# Patient Record
Sex: MALE | Race: WHITE | NOT HISPANIC OR LATINO | Employment: OTHER | URBAN - METROPOLITAN AREA
[De-identification: names, ages, dates, MRNs, and addresses within clinical notes are randomized per-mention and may not be internally consistent; named-entity substitution may affect disease eponyms.]

---

## 2017-02-21 ENCOUNTER — DOCUMENTATION ONLY (OUTPATIENT)
Dept: VASCULAR SURGERY | Facility: CLINIC | Age: 67
End: 2017-02-21

## 2017-04-21 DIAGNOSIS — N18.2 CKD (CHRONIC KIDNEY DISEASE) STAGE 2, GFR 60-89 ML/MIN: ICD-10-CM

## 2017-04-21 DIAGNOSIS — I10 ESSENTIAL HYPERTENSION WITH GOAL BLOOD PRESSURE LESS THAN 130/80: ICD-10-CM

## 2017-04-21 DIAGNOSIS — I25.10 CORONARY ARTERY DISEASE INVOLVING NATIVE CORONARY ARTERY OF NATIVE HEART WITHOUT ANGINA PECTORIS: ICD-10-CM

## 2017-04-21 DIAGNOSIS — E78.5 HYPERLIPIDEMIA LDL GOAL <100: ICD-10-CM

## 2017-04-24 NOTE — TELEPHONE ENCOUNTER
Lipitor     Last Written Prescription Date: 10/19/2016  Last Fill Quantity: 90, # refills: 1  Last Office Visit with [facility], [facility] or [facility] prescribing provider: 10/19/2016       Lab Results   Component Value Date    CHOL 169 10/19/2016     Lab Results   Component Value Date    HDL 50 10/19/2016     Lab Results   Component Value Date    LDL 96 10/19/2016     Lab Results   Component Value Date    TRIG 116 10/19/2016     Lab Results   Component Value Date    CHOLHDLRATIO 4.1 04/10/2015     Lisinopril      Last Written Prescription Date: 10/19/2016  Last Fill Quantity: 90, # refills: 1  Last Office Visit with [facility], [facility] or [facility] prescribing provider: 10/19/2016       Potassium   Date Value Ref Range Status   10/19/2016 4.4 3.4 - 5.3 mmol/L Final     Creatinine   Date Value Ref Range Status   10/19/2016 1.07 0.66 - 1.25 mg/dL Final     BP Readings from Last 3 Encounters:   10/19/16 108/68   05/25/16 116/66   04/28/16 124/80

## 2017-04-25 RX ORDER — ATORVASTATIN CALCIUM 80 MG/1
TABLET, FILM COATED ORAL
Qty: 90 TABLET | Refills: 1 | Status: SHIPPED | OUTPATIENT
Start: 2017-04-25 | End: 2017-10-24

## 2017-04-25 RX ORDER — LISINOPRIL 10 MG/1
TABLET ORAL
Qty: 90 TABLET | Refills: 1 | Status: SHIPPED | OUTPATIENT
Start: 2017-04-25 | End: 2017-10-24

## 2017-04-25 NOTE — TELEPHONE ENCOUNTER
Prescription approved per [facility] Refill Protocol.  Naima Iniguez, RN, BSN

## 2017-05-19 DIAGNOSIS — I10 ESSENTIAL HYPERTENSION WITH GOAL BLOOD PRESSURE LESS THAN 130/80: ICD-10-CM

## 2017-05-19 NOTE — TELEPHONE ENCOUNTER
metoprolol      Last Written Prescription Date: 10/19/16  Last Fill Quantity: 90, # refills: 1    Last Office Visit with [facility], [facility] or [facility] prescribing provider:  10/19/16   Future Office Visit:    NA    BP Readings from Last 3 Encounters:   10/19/16 108/68   05/25/16 116/66   04/28/16 124/80

## 2017-05-22 RX ORDER — METOPROLOL SUCCINATE 25 MG/1
TABLET, EXTENDED RELEASE ORAL
Qty: 90 TABLET | Refills: 0 | Status: SHIPPED | OUTPATIENT
Start: 2017-05-22 | End: 2017-08-18

## 2017-08-18 DIAGNOSIS — I10 ESSENTIAL HYPERTENSION WITH GOAL BLOOD PRESSURE LESS THAN 130/80: ICD-10-CM

## 2017-08-18 NOTE — TELEPHONE ENCOUNTER
Metoprolol      Last Written Prescription Date: 05/22/17  Last Fill Quantity: 90, # refills: 0    Last Office Visit with [facility] or [facility] prescribing provider:  10/19/16   Future Office Visit:        BP Readings from Last 3 Encounters:   10/19/16 108/68   05/25/16 116/66   04/28/16 124/80

## 2017-08-21 RX ORDER — METOPROLOL SUCCINATE 25 MG/1
TABLET, EXTENDED RELEASE ORAL
Qty: 90 TABLET | Refills: 0 | Status: SHIPPED | OUTPATIENT
Start: 2017-08-21 | End: 2017-11-17

## 2017-08-21 NOTE — TELEPHONE ENCOUNTER
Prescription approved per [facility] Refill Protocol.  Due for annual exam in October.     Jessica Goldman, RN, BSN

## 2017-10-24 DIAGNOSIS — E78.5 HYPERLIPIDEMIA LDL GOAL <100: ICD-10-CM

## 2017-10-24 DIAGNOSIS — I25.10 CORONARY ARTERY DISEASE INVOLVING NATIVE CORONARY ARTERY OF NATIVE HEART WITHOUT ANGINA PECTORIS: ICD-10-CM

## 2017-10-24 DIAGNOSIS — N18.2 CKD (CHRONIC KIDNEY DISEASE) STAGE 2, GFR 60-89 ML/MIN: ICD-10-CM

## 2017-10-24 DIAGNOSIS — I10 ESSENTIAL HYPERTENSION WITH GOAL BLOOD PRESSURE LESS THAN 130/80: ICD-10-CM

## 2017-10-27 RX ORDER — LISINOPRIL 10 MG/1
TABLET ORAL
Qty: 30 TABLET | Refills: 0 | Status: SHIPPED | OUTPATIENT
Start: 2017-10-27 | End: 2017-11-17

## 2017-10-27 RX ORDER — ATORVASTATIN CALCIUM 80 MG/1
TABLET, FILM COATED ORAL
Qty: 30 TABLET | Refills: 0 | Status: SHIPPED | OUTPATIENT
Start: 2017-10-27 | End: 2017-11-17

## 2017-10-27 NOTE — TELEPHONE ENCOUNTER
Lipitor  BP Readings from Last 3 Encounters:   10/19/16 108/68   05/25/16 116/66   04/28/16 124/80     Lab Results   Component Value Date    CHOL 169 10/19/2016     Lab Results   Component Value Date    HDL 50 10/19/2016     Lab Results   Component Value Date    LDL 96 10/19/2016     Lab Results   Component Value Date    TRIG 116 10/19/2016     Lab Results   Component Value Date    CHOLHDLRATIO 4.1 04/10/2015     Medication is being filled for 1 time refill only due to:  Patient needs labs FLP. Patient needs to be seen because it has been more than one year since last visit.    Lisinopril  Potassium   Date Value Ref Range Status   10/19/2016 4.4 3.4 - 5.3 mmol/L Final   ]  Creatinine   Date Value Ref Range Status   10/19/2016 1.07 0.66 - 1.25 mg/dL Final   ]  Medication is being filled for 1 time refill only due to:  Patient needs labs potassium and creatinine. Patient needs to be seen because it has been more than one year since last visit.     Claudine Williamson RN

## 2017-11-13 NOTE — PATIENT INSTRUCTIONS
Preventive Health Recommendations:       Male Ages 65 and over    Yearly exam:             See your health care provider every year in order to  o   Review health changes.   o   Discuss preventive care.    o   Review your medicines if your doctor has prescribed any.    Talk with your health care provider about whether you should have a test to screen for prostate cancer (PSA).    Every 3 years, have a diabetes test (fasting glucose). If you are at risk for diabetes, you should have this test more often.    Every 5 years, have a cholesterol test. Have this test more often if you are at risk for high cholesterol or heart disease.     Every 10 years, have a colonoscopy. Or, have a yearly FIT test (stool test). These exams will check for colon cancer.    Talk to with your health care provider about screening for Abdominal Aortic Aneurysm if you have a family history of AAA or have a history of smoking.  Shots:     Get a flu shot each year.     Get a tetanus shot every 10 years.     Talk to your doctor about your pneumonia vaccines. There are now two you should receive - Pneumovax (PPSV 23) and Prevnar (PCV 13).    Talk to your doctor about a shingles vaccine.     Talk to your doctor about the hepatitis B vaccine.  Nutrition:     Eat at least 5 servings of fruits and vegetables each day.     Eat whole-grain bread, whole-wheat pasta and brown rice instead of white grains and rice.     Talk to your doctor about Calcium and Vitamin D.   Lifestyle    Exercise for at least 150 minutes a week (30 minutes a day, 5 days a week). This will help you control your weight and prevent disease.     Limit alcohol to one drink per day.     No smoking.     Wear sunscreen to prevent skin cancer.     See your dentist every six months for an exam and cleaning.     See your eye doctor every 1 to 2 years to screen for conditions such as glaucoma, macular degeneration and cataracts.    .These are new changes to your current plan of care based  on today's visit:    Medications stopped    Medications to be started    Change dose of this medication None   New treatments        Follow up appointments:    1.  FOLLOW UP WITH YOUR PRIMARY CARE PROVIDER: 1 year(s) for General physical     Carter Malone MD

## 2017-11-13 NOTE — PROGRESS NOTES
SUBJECTIVE:   Kendrick Castellon is a 67 year old male who presents for Preventive Visit.    Are you in the first 12 months of your Medicare coverage?  Yes,  Visual Acuity:  Right Eye: 20/20 w/glasses w/o glasses unable to see.    Left Eye: 20/25 w/glasses, w/o glasses 10/80    Physical   Annual:     Getting at least 3 servings of Calcium per day::  Yes    Bi-annual eye exam::  Yes    Dental care twice a year::  Yes    Sleep apnea or symptoms of sleep apnea::  None    Diet::  Regular (no restrictions)    Frequency of exercise::  6-7 days/week    Duration of exercise::  30-45 minutes    Taking medications regularly::  Yes    Medication side effects::  None    Additional concerns today::  No      COGNITIVE SCREEN  1) Repeat 3 items (Banana, Sunrise, Chair)    2) Clock draw: NORMAL  3) 3 item recall: Recalls 3 objects   Results: NORMAL clock, 3 items recalled: COGNITIVE IMPAIRMENT LESS LIKELY    Mini-CogTM Copyright [employer]. Licensed by the author for use in [facility]; reprinted with permission (ede@[facility]). All rights reserved.          Reviewed and updated as needed this visit by clinical staff  Tobacco  Allergies  Meds  Med Hx  Surg Hx  Fam Hx  Soc Hx        Reviewed and updated as needed this visit by Provider        Social History   Substance Use Topics     Smoking status: Former Smoker     Packs/day: 1.00     Years: 15.00     Quit date: 4/2/1987     Smokeless tobacco: Never Used     Alcohol use 7.0 oz/week     14 Standard drinks or equivalent per week      Comment: moderation       The patient does not drink >3 drinks per day nor >7 drinks per week.      Today's PHQ-2 Score:   PHQ-2 ( 1999 Pfizer) 11/17/2017   Q1: Little interest or pleasure in doing things 0   Q2: Feeling down, depressed or hopeless 0   PHQ-2 Score 0   Q1: Little interest or pleasure in doing things Not at all   Q2: Feeling down, depressed or hopeless Not at all   PHQ-2 Score 0       Do you feel safe in your environment -  Yes    Do you have a Health Care Directive?: No, Patient states he has information. He has just not got around to it.    Current providers sharing in care for this patient include: Patient Care Team:  Carter Malone MD as PCP - General (Family Practice)      Hearing impairment: No    Ability to successfully perform activities of daily living: Yes, no assistance needed     Fall risk:  Fallen 2 or more times in the past year?: No  Any fall with injury in the past year?: No      Home safety:  none identified      The following health maintenance items are reviewed in Epic and correct as of today:  Health Maintenance   Topic Date Due     FALL RISK ASSESSMENT  04/28/2017     PNEUMOCOCCAL (2 of 2 - PPSV23) 04/28/2017     INFLUENZA VACCINE (SYSTEM ASSIGNED)  09/01/2017     CMP Q1 YR  10/19/2017     LIPID MONITORING Q1 YEAR  10/19/2017     MICROALBUMIN Q1 YEAR  10/19/2017     ADVANCE DIRECTIVE PLANNING Q5 YRS  11/09/2017     COLONOSCOPY Q10 YR  05/10/2022     TETANUS IMMUNIZATION (SYSTEM ASSIGNED)  04/28/2026     AORTIC ANEURYSM SCREENING (SYSTEM ASSIGNED)  Completed     HEPATITIS C SCREENING  Addressed         Pneumonia Vaccine:Adults age 65+ who received Pneumovax (PPSV23) at 65 years or older: Should be given PCV13 > 1 year after their most recent PPSV23      Hyperlipidemia Follow-Up      Rate your low fat/cholesterol diet?: good    Taking statin?  Yes, no muscle aches from statin    Other lipid medications/supplements?:  none    Hypertension Follow-up      Outpatient blood pressures are not being checked.    Low Salt Diet: no added salt    Vascular Disease Follow-up:  Coronary Artery Disease (CAD)      Chest pain or pressure, left side neck or arm pain: No    Shortness of breath/increased sweats/nausea with exertion: No    Pain in calves walking 1-2 blocks: No    Worsened or new symptoms since last visit: No    Nitroglycerin use: no    Daily aspirin use: Yes    Chronic Kidney Disease Follow-up      Current NSAID  "use?  No            Review of Systems  C: NEGATIVE for fever, chills, change in weight  CONSTITUTIONAL: Has reduced his weight over time with exercise daily and some dietary changes.  I: NEGATIVE for worrisome rashes, moles or lesions  E: NEGATIVE for vision changes or irritation  E/M: NEGATIVE for ear, mouth and throat problems  R: NEGATIVE for significant cough or SOB  B: NEGATIVE for masses, tenderness or discharge  CV: NEGATIVE for chest pain, palpitations or peripheral edema  CV: No recurrent anginal symptoms. He has had a previous open heart surgery and bypass procedure.  GI: NEGATIVE for nausea, abdominal pain, heartburn, or change in bowel habits  GI: Doing well with current medications in controlling GERD.  : NEGATIVE for frequency, dysuria, or hematuria   male : Past history of chronic kidney disease stage II with microalbuminuria. Rechecking today.  M: NEGATIVE for significant arthralgias or myalgia  N: NEGATIVE for weakness, dizziness or paresthesias  E: NEGATIVE for temperature intolerance, skin/hair changes  H: NEGATIVE for bleeding problems  P: NEGATIVE for changes in mood or affect    OBJECTIVE:   /72  Pulse 68  Temp 98  F (36.7  C) (Temporal)  Resp 16  Ht 5' 9.25\" (1.759 m)  Wt 196 lb 6.4 oz (89.1 kg)  BMI 28.79 kg/m2 Estimated body mass index is 28.79 kg/(m^2) as calculated from the following:    Height as of this encounter: 5' 9.25\" (1.759 m).    Weight as of this encounter: 196 lb 6.4 oz (89.1 kg).  Physical Exam  GENERAL: healthy, alert and no distress  EYES: Eyes grossly normal to inspection, PERRL and conjunctivae and sclerae normal  HENT: ear canals and TM's normal, nose and mouth without ulcers or lesions  NECK: no adenopathy, no asymmetry, masses, or scars and thyroid normal to palpation  RESP: lungs clear to auscultation - no rales, rhonchi or wheezes  BREAST: normal without masses, tenderness or nipple discharge and no palpable axillary masses or adenopathy  CV: regular " rate and rhythm, normal S1 S2, no S3 or S4, no murmur, click or rub, no peripheral edema and peripheral pulses strong  CV: post open heart surgery  ABDOMEN: soft, nontender, no hepatosplenomegaly, no masses and bowel sounds normal   (male): normal male genitalia without lesions or urethral discharge, no hernia  RECTAL: normal sphincter tone, no rectal masses and prostate 1-2 + enlarged, nontender  MS: no gross musculoskeletal defects noted, no edema  SKIN: no suspicious lesions or rashes  NEURO: Normal strength and tone, mentation intact and speech normal  PSYCH: mentation appears normal, affect normal/bright  LYMPH: no cervical, supraclavicular, axillary, or inguinal adenopathy    ASSESSMENT / PLAN:   1. Medicare annual wellness visit, initial    Completed.    2. Coronary artery disease involving native coronary artery of native heart without angina pectoris   renew medications for 1 year. Checking labs.  - atorvastatin (LIPITOR) 80 MG tablet; TAKE 1 TABLET(80 MG) BY MOUTH DAILY  Dispense: 90 tablet; Refill: 3  - CBC with platelets  - Lipid panel reflex to direct LDL Fasting  - Albumin Random Urine Quantitative with Creat Ratio  - Comprehensive metabolic panel    3. Hyperlipidemia LDL goal <100   Continue current medications pending laboratory results  - atorvastatin (LIPITOR) 80 MG tablet; TAKE 1 TABLET(80 MG) BY MOUTH DAILY  Dispense: 90 tablet; Refill: 3  - Lipid panel reflex to direct LDL Fasting  - Comprehensive metabolic panel    4. CKD (chronic kidney disease) stage 2, GFR 60-89 ml/min  Checking laboratory results.  - lisinopril (PRINIVIL/ZESTRIL) 10 MG tablet; TAKE 1 TABLET(10 MG) BY MOUTH DAILY  Dispense: 90 tablet; Refill: 3  - CBC with platelets  - Lipid panel reflex to direct LDL Fasting  - Comprehensive metabolic panel    5. Essential hypertension with goal blood pressure less than 130/80  Blood pressure adequately controlled.  - lisinopril (PRINIVIL/ZESTRIL) 10 MG tablet; TAKE 1 TABLET(10 MG) BY  "MOUTH DAILY  Dispense: 90 tablet; Refill: 3  - metoprolol (TOPROL-XL) 25 MG 24 hr tablet; TAKE 1 TABLET(25 MG) BY MOUTH DAILY  Dispense: 90 tablet; Refill: 3  - Comprehensive metabolic panel    6. Erectile dysfunction, unspecified erectile dysfunction type  Renew medications, no side effects noted  - sildenafil (VIAGRA) 100 MG tablet; Take 0.5-1 tablets ( mg) by mouth daily as needed Take 30 min to 4 hours before intercourse.  Never use with nitroglycerin, terazosin or doxazosin.  Dispense: 36 tablet; Refill: 4    7. Special screening for malignant neoplasm of prostate  Checking PSA after discussion.  - Prostate spec antigen screen    8. Microalbuminuria  Checking labs with previous positive findings  - Albumin Random Urine Quantitative with Creat Ratio    9. Gastroesophageal reflux disease without esophagitis  Continue current medications    10. Need for 23-polyvalent pneumococcal polysaccharide vaccine  Complete pneumococcal vaccination series.  - Pneumococcal vaccine 23 valent PPSV23  (Pneumovax) [25638]  - ADMIN: Vaccine, Initial ([id number])    End of Life Planning:  Patient currently has an advanced directive: No.  I have verified the patient's ability to prepare an advanced directive/make health care decisions.  Literature was provided to assist patient in preparing an advanced directive.    COUNSELING:  Reviewed preventive health counseling, as reflected in patient instructions  Special attention given to:       Regular exercise       Healthy diet/nutrition       Immunizations    Vaccinated for: Pneumococcal           Aspirin Prophylaxis       Colon cancer screening       Prostate cancer screening          Estimated body mass index is 28.79 kg/(m^2) as calculated from the following:    Height as of this encounter: 5' 9.25\" (1.759 m).    Weight as of this encounter: 196 lb 6.4 oz (89.1 kg).  Weight management plan: Discussed healthy diet and exercise guidelines and patient will follow up in 12 months in " clinic to re-evaluate.   reports that he quit smoking about 30 years ago. He has a 15.00 pack-year smoking history. He has never used smokeless tobacco.        Appropriate preventive services were discussed with this patient, including applicable screening as appropriate for cardiovascular disease, diabetes, osteopenia/osteoporosis, and glaucoma.  As appropriate for age/gender, discussed screening for colorectal cancer, prostate cancer, breast cancer, and cervical cancer. Checklist reviewing preventive services available has been given to the patient.    Reviewed patients plan of care and provided an AVS. The Basic Care Plan (routine screening as documented in Health Maintenance) for Kendrick meets the Care Plan requirement. This Care Plan has been established and reviewed with the Patient.    Counseling Resources:  ATP IV Guidelines  Pooled Cohorts Equation Calculator  Breast Cancer Risk Calculator  FRAX Risk Assessment  ICSI Preventive Guidelines  Dietary Guidelines for Americans, 2010  USDA's MyPlate  ASA Prophylaxis  Lung CA Screening    Follow-up in 1 year or as needed.    The patient understood the rational for the diagnosis and treatment plan. All questions were answered to best of my ability and the patient's satisfaction.    Note: Chart documentation done in part with Dragon Voice Recognition software. Although reviewed after completion, some word and grammatical errors may remain.  Please consider this when interpreting information in this chart.      Carter Malone MD  [facility]

## 2017-11-17 ENCOUNTER — OFFICE VISIT (OUTPATIENT)
Dept: FAMILY MEDICINE | Facility: CLINIC | Age: 67
End: 2017-11-17
Payer: MEDICARE

## 2017-11-17 VITALS
HEART RATE: 68 BPM | SYSTOLIC BLOOD PRESSURE: 134 MMHG | BODY MASS INDEX: 29.09 KG/M2 | TEMPERATURE: 98 F | DIASTOLIC BLOOD PRESSURE: 72 MMHG | WEIGHT: 196.4 LBS | HEIGHT: 69 IN | RESPIRATION RATE: 16 BRPM

## 2017-11-17 DIAGNOSIS — K21.9 GASTROESOPHAGEAL REFLUX DISEASE WITHOUT ESOPHAGITIS: ICD-10-CM

## 2017-11-17 DIAGNOSIS — Z12.5 SPECIAL SCREENING FOR MALIGNANT NEOPLASM OF PROSTATE: ICD-10-CM

## 2017-11-17 DIAGNOSIS — I25.10 CORONARY ARTERY DISEASE INVOLVING NATIVE CORONARY ARTERY OF NATIVE HEART WITHOUT ANGINA PECTORIS: ICD-10-CM

## 2017-11-17 DIAGNOSIS — I10 ESSENTIAL HYPERTENSION WITH GOAL BLOOD PRESSURE LESS THAN 130/80: ICD-10-CM

## 2017-11-17 DIAGNOSIS — N52.9 ERECTILE DYSFUNCTION, UNSPECIFIED ERECTILE DYSFUNCTION TYPE: ICD-10-CM

## 2017-11-17 DIAGNOSIS — Z23 NEED FOR 23-POLYVALENT PNEUMOCOCCAL POLYSACCHARIDE VACCINE: ICD-10-CM

## 2017-11-17 DIAGNOSIS — N18.2 CKD (CHRONIC KIDNEY DISEASE) STAGE 2, GFR 60-89 ML/MIN: ICD-10-CM

## 2017-11-17 DIAGNOSIS — Z00.00 MEDICARE ANNUAL WELLNESS VISIT, INITIAL: Primary | ICD-10-CM

## 2017-11-17 DIAGNOSIS — E78.5 HYPERLIPIDEMIA LDL GOAL <100: ICD-10-CM

## 2017-11-17 DIAGNOSIS — R80.9 MICROALBUMINURIA: ICD-10-CM

## 2017-11-17 LAB
ALBUMIN SERPL-MCNC: 3.9 G/DL (ref 3.4–5)
ALP SERPL-CCNC: 68 U/L (ref 40–150)
ALT SERPL W P-5'-P-CCNC: 29 U/L (ref 0–70)
ANION GAP SERPL CALCULATED.3IONS-SCNC: 4 MMOL/L (ref 3–14)
AST SERPL W P-5'-P-CCNC: 21 U/L (ref 0–45)
BILIRUB SERPL-MCNC: 0.5 MG/DL (ref 0.2–1.3)
BUN SERPL-MCNC: 15 MG/DL (ref 7–30)
CALCIUM SERPL-MCNC: 9.3 MG/DL (ref 8.5–10.1)
CHLORIDE SERPL-SCNC: 105 MMOL/L (ref 94–109)
CHOLEST SERPL-MCNC: 171 MG/DL
CO2 SERPL-SCNC: 31 MMOL/L (ref 20–32)
CREAT SERPL-MCNC: 1.15 MG/DL (ref 0.66–1.25)
CREAT UR-MCNC: 163 MG/DL
ERYTHROCYTE [DISTWIDTH] IN BLOOD BY AUTOMATED COUNT: 13.5 % (ref 10–15)
GFR SERPL CREATININE-BSD FRML MDRD: 64 ML/MIN/1.7M2
GLUCOSE SERPL-MCNC: 91 MG/DL (ref 70–99)
HCT VFR BLD AUTO: 47.6 % (ref 40–53)
HDLC SERPL-MCNC: 51 MG/DL
HGB BLD-MCNC: 15.7 G/DL (ref 13.3–17.7)
LDLC SERPL CALC-MCNC: 88 MG/DL
MCH RBC QN AUTO: 30 PG (ref 26.5–33)
MCHC RBC AUTO-ENTMCNC: 33 G/DL (ref 31.5–36.5)
MCV RBC AUTO: 91 FL (ref 78–100)
MICROALBUMIN UR-MCNC: 34 MG/L
MICROALBUMIN/CREAT UR: 20.98 MG/G CR (ref 0–17)
NONHDLC SERPL-MCNC: 120 MG/DL
PLATELET # BLD AUTO: 211 10E9/L (ref 150–450)
POTASSIUM SERPL-SCNC: 5 MMOL/L (ref 3.4–5.3)
PROT SERPL-MCNC: 7.8 G/DL (ref 6.8–8.8)
PSA SERPL-ACNC: 1.95 UG/L (ref 0–4)
RBC # BLD AUTO: 5.23 10E12/L (ref 4.4–5.9)
SODIUM SERPL-SCNC: 140 MMOL/L (ref 133–144)
TRIGL SERPL-MCNC: 158 MG/DL
WBC # BLD AUTO: 8.4 10E9/L (ref 4–11)

## 2017-11-17 PROCEDURE — 82043 UR ALBUMIN QUANTITATIVE: CPT | Performed by: FAMILY MEDICINE

## 2017-11-17 PROCEDURE — 36415 COLL VENOUS BLD VENIPUNCTURE: CPT | Performed by: FAMILY MEDICINE

## 2017-11-17 PROCEDURE — G0103 PSA SCREENING: HCPCS | Performed by: FAMILY MEDICINE

## 2017-11-17 PROCEDURE — 80053 COMPREHEN METABOLIC PANEL: CPT | Performed by: FAMILY MEDICINE

## 2017-11-17 PROCEDURE — 90471 IMMUNIZATION ADMIN: CPT | Performed by: FAMILY MEDICINE

## 2017-11-17 PROCEDURE — 90732 PPSV23 VACC 2 YRS+ SUBQ/IM: CPT | Performed by: FAMILY MEDICINE

## 2017-11-17 PROCEDURE — 80061 LIPID PANEL: CPT | Performed by: FAMILY MEDICINE

## 2017-11-17 PROCEDURE — 85027 COMPLETE CBC AUTOMATED: CPT | Performed by: FAMILY MEDICINE

## 2017-11-17 PROCEDURE — G0402 INITIAL PREVENTIVE EXAM: HCPCS | Performed by: FAMILY MEDICINE

## 2017-11-17 RX ORDER — LISINOPRIL 10 MG/1
TABLET ORAL
Qty: 90 TABLET | Refills: 3 | Status: SHIPPED | OUTPATIENT
Start: 2017-11-17 | End: 2018-10-22

## 2017-11-17 RX ORDER — METOPROLOL SUCCINATE 25 MG/1
TABLET, EXTENDED RELEASE ORAL
Qty: 90 TABLET | Refills: 3 | Status: SHIPPED | OUTPATIENT
Start: 2017-11-17 | End: 2018-10-22

## 2017-11-17 RX ORDER — ATORVASTATIN CALCIUM 80 MG/1
TABLET, FILM COATED ORAL
Qty: 90 TABLET | Refills: 3 | Status: SHIPPED | OUTPATIENT
Start: 2017-11-17 | End: 2019-01-21

## 2017-11-17 RX ORDER — SILDENAFIL 100 MG/1
50-100 TABLET, FILM COATED ORAL DAILY PRN
Qty: 36 TABLET | Refills: 4 | Status: SHIPPED | OUTPATIENT
Start: 2017-11-17 | End: 2018-11-19

## 2017-11-17 ASSESSMENT — PAIN SCALES - GENERAL: PAINLEVEL: NO PAIN (0)

## 2017-11-17 NOTE — NURSING NOTE
Screening Questionnaire for Adult Immunization    Are you sick today?   No   Do you have allergies to medications, food, a vaccine component or latex?   No   Have you ever had a serious reaction after receiving a vaccination?   No   Do you have a long-term health problem with heart disease, lung disease, asthma, kidney disease, metabolic disease (e.g. diabetes), anemia, or other blood disorder?   No   Do you have cancer, leukemia, HIV/AIDS, or any other immune system problem?   No   In the past 3 months, have you taken medications that affect  your immune system, such as prednisone, other steroids, or anticancer drugs; drugs for the treatment of rheumatoid arthritis, Crohn s disease, or psoriasis; or have you had radiation treatments?   No   Have you had a seizure, or a brain or other nervous system problem?   No   During the past year, have you received a transfusion of blood or blood     products, or been given immune (gamma) globulin or antiviral drug?   No   For women: Are you pregnant or is there a chance you could become        pregnant during the next month?   No   Have you received any vaccinations in the past 4 weeks?   No     Immunization questionnaire answers were all negative.        Per orders of Dr. Malone, injection of Pneumovax 23 given by Anna Lora. Patient instructed to remain in clinic for 15 minutes afterwards, and to report any adverse reaction to me immediately.  Prior to injection verified patient identity using patient's name and date of birth.       Screening performed by Anna Lora on 11/17/2017 at 9:40 AM.

## 2017-11-17 NOTE — MR AVS SNAPSHOT
After Visit Summary   11/17/2017    Kendrick Castellon    MRN: [id number]           Patient Information     Date Of Birth          1950        Visit Information        Provider Department      11/17/2017 8:40 AM Carter Malone MD [facility]        Today's Diagnoses     Medicare annual wellness visit, initial    -  1    Coronary artery disease involving native coronary artery of native heart without angina pectoris        Hyperlipidemia LDL goal <100        CKD (chronic kidney disease) stage 2, GFR 60-89 ml/min        Essential hypertension with goal blood pressure less than 130/80        Erectile dysfunction, unspecified erectile dysfunction type        Special screening for malignant neoplasm of prostate        Microalbuminuria        Gastroesophageal reflux disease without esophagitis        Need for 23-polyvalent pneumococcal polysaccharide vaccine          Care Instructions      Preventive Health Recommendations:       Male Ages 65 and over    Yearly exam:             See your health care provider every year in order to  o   Review health changes.   o   Discuss preventive care.    o   Review your medicines if your doctor has prescribed any.    Talk with your health care provider about whether you should have a test to screen for prostate cancer (PSA).    Every 3 years, have a diabetes test (fasting glucose). If you are at risk for diabetes, you should have this test more often.    Every 5 years, have a cholesterol test. Have this test more often if you are at risk for high cholesterol or heart disease.     Every 10 years, have a colonoscopy. Or, have a yearly FIT test (stool test). These exams will check for colon cancer.    Talk to with your health care provider about screening for Abdominal Aortic Aneurysm if you have a family history of AAA or have a history of smoking.  Shots:     Get a flu shot each year.     Get a tetanus shot every 10 years.     Talk to your doctor about  your pneumonia vaccines. There are now two you should receive - Pneumovax (PPSV 23) and Prevnar (PCV 13).    Talk to your doctor about a shingles vaccine.     Talk to your doctor about the hepatitis B vaccine.  Nutrition:     Eat at least 5 servings of fruits and vegetables each day.     Eat whole-grain bread, whole-wheat pasta and brown rice instead of white grains and rice.     Talk to your doctor about Calcium and Vitamin D.   Lifestyle    Exercise for at least 150 minutes a week (30 minutes a day, 5 days a week). This will help you control your weight and prevent disease.     Limit alcohol to one drink per day.     No smoking.     Wear sunscreen to prevent skin cancer.     See your dentist every six months for an exam and cleaning.     See your eye doctor every 1 to 2 years to screen for conditions such as glaucoma, macular degeneration and cataracts.    .These are new changes to your current plan of care based on today's visit:    Medications stopped    Medications to be started    Change dose of this medication None   New treatments        Follow up appointments:    1.  FOLLOW UP WITH YOUR PRIMARY CARE PROVIDER: 1 year(s) for General physical     Carter Malone MD                Follow-ups after your visit        Follow-up notes from your care team     Return in about 1 year (around 11/17/2018) for Physical Exam, Lab Work.      Who to contact     If you have questions or need follow up information about today's clinic visit or your schedule please contact [facility] directly at [phone].  Normal or non-critical lab and imaging results will be communicated to you by MyChart, letter or phone within 4 business days after the clinic has received the results. If you do not hear from us within 7 days, please contact the clinic through MyChart or phone. If you have a critical or abnormal lab result, we will notify you by phone as soon as possible.  Submit refill requests through [email or web address] or call your  "pharmacy and they will forward the refill request to us. Please allow 3 business days for your refill to be completed.          Additional Information About Your Visit        [employer] Information     [employer] gives you secure access to your electronic health record. If you see a primary care provider, you can also send messages to your care team and make appointments. If you have questions, please call your primary care clinic.  If you do not have a primary care provider, please call [phone] and they will assist you.        Care EveryWhere ID     This is your Care EveryWhere ID. This could be used by other organizations to access your [facility] medical records  [id number]        Your Vitals Were     Pulse Temperature Respirations Height BMI (Body Mass Index)       68 98  F (36.7  C) (Temporal) 16 5' 9.25\" (1.759 m) 28.79 kg/m2        Blood Pressure from Last 3 Encounters:   11/17/17 134/72   10/19/16 108/68   05/25/16 116/66    Weight from Last 3 Encounters:   11/17/17 196 lb 6.4 oz (89.1 kg)   10/19/16 219 lb 1.6 oz (99.4 kg)   05/25/16 225 lb 12.8 oz (102.4 kg)              We Performed the Following     ADMIN: Vaccine, Initial (36786)     Albumin Random Urine Quantitative with Creat Ratio     CBC with platelets     Comprehensive metabolic panel     Lipid panel reflex to direct LDL Fasting     Pneumococcal vaccine 23 valent PPSV23  (Pneumovax) [68502]     Prostate spec antigen screen          Today's Medication Changes          These changes are accurate as of: 11/17/17  9:24 AM.  If you have any questions, ask your nurse or doctor.               These medicines have changed or have updated prescriptions.        Dose/Directions    atorvastatin 80 MG tablet   Commonly known as:  LIPITOR   This may have changed:  See the new instructions.   Used for:  Coronary artery disease involving native coronary artery of native heart without angina pectoris, Hyperlipidemia LDL goal <100   Changed by:  Carter Malone, " MD        TAKE 1 TABLET(80 MG) BY MOUTH DAILY   Quantity:  90 tablet   Refills:  3       lisinopril 10 MG tablet   Commonly known as:  PRINIVIL/ZESTRIL   This may have changed:  See the new instructions.   Used for:  CKD (chronic kidney disease) stage 2, GFR 60-89 ml/min, Essential hypertension with goal blood pressure less than 130/80   Changed by:  Carter Malone MD        TAKE 1 TABLET(10 MG) BY MOUTH DAILY   Quantity:  90 tablet   Refills:  3       metoprolol 25 MG 24 hr tablet   Commonly known as:  TOPROL-XL   This may have changed:  See the new instructions.   Used for:  Essential hypertension with goal blood pressure less than 130/80   Changed by:  Carter Malone MD        TAKE 1 TABLET(25 MG) BY MOUTH DAILY   Quantity:  90 tablet   Refills:  3       sildenafil 100 MG tablet   Commonly known as:  VIAGRA   This may have changed:  reasons to take this   Used for:  Erectile dysfunction, unspecified erectile dysfunction type   Changed by:  Carter Malone MD        Dose:   mg   Take 0.5-1 tablets ( mg) by mouth daily as needed Take 30 min to 4 hours before intercourse.  Never use with nitroglycerin, terazosin or doxazosin.   Quantity:  36 tablet   Refills:  4            Where to get your medicines      These medications were sent to [facility] [address] [place] [address] & [address], [place] [address]     Phone:  [phone]     atorvastatin 80 MG tablet    lisinopril 10 MG tablet    metoprolol 25 MG 24 hr tablet         Some of these will need a paper prescription and others can be bought over the counter.  Ask your nurse if you have questions.     Bring a paper prescription for each of these medications     sildenafil 100 MG tablet                Primary Care Provider Office Phone # Fax #    Carter Malone [phone] [address] [facility] [id number]        Equal Access to Services      [facility]: Hadii aad ku mikael Sotashiali, waaxda luqadaha, qaybta kaalmada adeegisis, juany alonzo prashanthmarky walker jayluis scales . So [facility] [phone].    ATENCIÓN: Si habla español, tiene a edwards disposición servicios gratuitos de asistencia lingüística. Llame al [phone].    We comply with applicable federal civil rights laws and [place] laws. We do not discriminate on the basis of race, color, national origin, age, disability, sex, sexual orientation, or gender identity.            Thank you!     Thank you for choosing [facility]  for your care. Our goal is always to provide you with excellent care. Hearing back from our patients is one way we can continue to improve our services. Please take a few minutes to complete the written survey that you may receive in the mail after your visit with us. Thank you!             Your Updated Medication List - Protect others around you: Learn how to safely use, store and throw away your medicines at www.disposemymeds.org.          This list is accurate as of: 11/17/17  9:24 AM.  Always use your most recent med list.                   Brand Name Dispense Instructions for use Diagnosis    aspirin 81 MG tablet      Take 1 tablet by mouth daily.        atorvastatin 80 MG tablet    LIPITOR    90 tablet    TAKE 1 TABLET(80 MG) BY MOUTH DAILY    Coronary artery disease involving native coronary artery of native heart without angina pectoris, Hyperlipidemia LDL goal <100       lisinopril 10 MG tablet    PRINIVIL/ZESTRIL    90 tablet    TAKE 1 TABLET(10 MG) BY MOUTH DAILY    CKD (chronic kidney disease) stage 2, GFR 60-89 ml/min, Essential hypertension with goal blood pressure less than 130/80       metoprolol 25 MG 24 hr tablet    TOPROL-XL    90 tablet    TAKE 1 TABLET(25 MG) BY MOUTH DAILY    Essential hypertension with goal blood pressure less than 130/80       PREVACID 15 MG CR capsule   Generic drug:  LANsoprazole     90 capsule    Take 1 capsule (15 mg) by  mouth daily Take 30-60 minutes before a meal.        sildenafil 100 MG tablet    VIAGRA    36 tablet    Take 0.5-1 tablets ( mg) by mouth daily as needed Take 30 min to 4 hours before intercourse.  Never use with nitroglycerin, terazosin or doxazosin.    Erectile dysfunction, unspecified erectile dysfunction type

## 2017-11-17 NOTE — NURSING NOTE
"Chief Complaint   Patient presents with     Panel Management     flu, tdap, fall risk, honoring choices, lipids, BMP, microalbumin     Physical       Initial /72  Pulse 68  Temp 98  F (36.7  C) (Temporal)  Resp 16  Ht 5' 9.25\" (1.759 m)  Wt 196 lb 6.4 oz (89.1 kg)  BMI 28.79 kg/m2 Estimated body mass index is 28.79 kg/(m^2) as calculated from the following:    Height as of this encounter: 5' 9.25\" (1.759 m).    Weight as of this encounter: 196 lb 6.4 oz (89.1 kg).  Medication Reconciliation: complete    "

## 2018-09-25 ENCOUNTER — ALLIED HEALTH/NURSE VISIT (OUTPATIENT)
Dept: FAMILY MEDICINE | Facility: CLINIC | Age: 68
End: 2018-09-25
Payer: MEDICARE

## 2018-09-25 DIAGNOSIS — Z23 NEED FOR PROPHYLACTIC VACCINATION AND INOCULATION AGAINST INFLUENZA: Primary | ICD-10-CM

## 2018-09-25 PROCEDURE — 90662 IIV NO PRSV INCREASED AG IM: CPT

## 2018-09-25 PROCEDURE — 99207 ZZC NO CHARGE NURSE ONLY: CPT

## 2018-09-25 PROCEDURE — G0008 ADMIN INFLUENZA VIRUS VAC: HCPCS

## 2018-09-25 NOTE — PROGRESS NOTES
Injectable Influenza Immunization Documentation    1.  Is the person to be vaccinated sick today?   No    2. Does the person to be vaccinated have an allergy to a component   of the vaccine?   No  Egg Allergy Algorithm Link    3. Has the person to be vaccinated ever had a serious reaction   to influenza vaccine in the past?   No    4. Has the person to be vaccinated ever had Guillain-Barré syndrome?   No    Form completed by Dominga Gunn MA          Screening Questionnaire for Adult Immunization    Are you sick today?   No   Do you have allergies to medications, food, a vaccine component or latex?   No   Have you ever had a serious reaction after receiving a vaccination?   No   Do you have a long-term health problem with heart disease, lung disease, asthma, kidney disease, metabolic disease (e.g. diabetes), anemia, or other blood disorder?   No   Do you have cancer, leukemia, HIV/AIDS, or any other immune system problem?   No   In the past 3 months, have you taken medications that affect  your immune system, such as prednisone, other steroids, or anticancer drugs; drugs for the treatment of rheumatoid arthritis, Crohn s disease, or psoriasis; or have you had radiation treatments?   No   Have you had a seizure, or a brain or other nervous system problem?   No   During the past year, have you received a transfusion of blood or blood     products, or been given immune (gamma) globulin or antiviral drug?   No   For women: Are you pregnant or is there a chance you could become        pregnant during the next month?   No   Have you received any vaccinations in the past 4 weeks?   No     Immunization questionnaire answers were all negative.         Standing order per staff recommendation no , injection of FLU given by Dominga Gunn. Patient instructed to remain in clinic for 15 minutes afterwards, and to report any adverse reaction to me immediately.       Screening performed by Dominga Gunn on 9/25/2018 at 11:26  AM.

## 2018-10-22 DIAGNOSIS — N18.2 CKD (CHRONIC KIDNEY DISEASE) STAGE 2, GFR 60-89 ML/MIN: ICD-10-CM

## 2018-10-22 DIAGNOSIS — I10 ESSENTIAL HYPERTENSION WITH GOAL BLOOD PRESSURE LESS THAN 130/80: ICD-10-CM

## 2018-10-23 RX ORDER — METOPROLOL SUCCINATE 25 MG/1
TABLET, EXTENDED RELEASE ORAL
Qty: 90 TABLET | Refills: 0 | Status: SHIPPED | OUTPATIENT
Start: 2018-10-23 | End: 2018-11-19

## 2018-10-23 RX ORDER — LISINOPRIL 10 MG/1
TABLET ORAL
Qty: 90 TABLET | Refills: 0 | Status: SHIPPED | OUTPATIENT
Start: 2018-10-23 | End: 2018-11-19

## 2018-10-23 NOTE — TELEPHONE ENCOUNTER
Called patient and informed of message below and is scheduled for 11/19/2018 with Dr. Cedillo.    Chelo Maier, [facility] [address]  October 23, 2018

## 2018-10-23 NOTE — TELEPHONE ENCOUNTER
Toprol-XL and Lisinopril    Medication is being filled for 1 time refill only due to:  Patient needs to be seen because it has been more than one year since last visit.     Last physical 11/17/2017, please help lennie Rico, RN, BSN

## 2018-11-14 NOTE — PROGRESS NOTES
"SUBJECTIVE:   Kendrick Castellon is a 68 year old male who presents for Preventive Visit.    Are you in the first 12 months of your Medicare coverage?  No    Annual Wellness Visit     In general, how would you rate your overall health?  Very good    Frequency of exercise:  6-7 days/week    Duration of exercise:  45-60 minutes    Do you usually eat at least 4 servings of fruit and vegetables a day, include whole grains    & fiber and avoid regularly eating high fat or \"junk\" foods?  Yes    Taking medications regularly:  Yes    Medication side effects:  Not applicable    Ability to successfully perform activities of daily living:  No assistance needed    Home Safety:  No safety concerns identified    Hearing Impairment:  No hearing concerns    In the past 6 months, have you been bothered by leaking of urine?  No   In general, how would you rate your overall mental or emotional health?  Very good    PHQ-2 Total Score:    Additional concerns today:  No        Fall risk:  Fallen 2 or more times in the past year?: No  Any fall with injury in the past year?: No    COGNITIVE SCREEN  1) Repeat 3 items (Leader, Season, Table)    2) Clock draw: NORMAL  3) 3 item recall: Recalls 2 objects   Results: NORMAL clock, 1-2 items recalled: COGNITIVE IMPAIRMENT LESS LIKELY    Mini-CogTM Copyright S Delio. Licensed by the author for use in [facility]; reprinted with permission ([email or web address]@[email or web address]). All rights reserved.        Patient reports to the clinic for his annual physical exam and to establish care with new provider.     He is still working. 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    Patient had a CABGx3 in 2001. He has not seen cardiology since 2016. He states that he feels he doesn't need to as he has lost about 30 pounds with diet and activity; he walks 3 miles daily with 5 miles on the weekend. Family history of cardiac disease, diabetes, and unspecified cancer.  Father passed away from MI at 67 years " "old.    Patient notes that he does not have a living will, but he and his wife have been slowly working on one.     He states that he is not using his Viagra very much, but when he does he prefers to get it in [place] due to expenses. With his weight loss his erectile dysfunction is \"much better.\"    Denies chest pain, shortness of breath, sleep issues, vision changes, bowel or bladder changes, nightly urination more than once a night. He notes that he will occasionally get lightheaded if he stands up too fast, but otherwise denies dizziness regularly.       Reviewed and updated as needed this visit by clinical staff  Tobacco  Allergies  Meds  Problems  Med Hx  Surg Hx  Fam Hx  Soc Hx        Reviewed and updated as needed this visit by Provider  Allergies  Meds  Problems        Social History   Substance Use Topics     Smoking status: Former Smoker     Packs/day: 1.00     Years: 15.00     Quit date: 4/2/1987     Smokeless tobacco: Never Used     Alcohol use No     No flowsheet data found.No flowsheet data found.    Do you feel safe in your environment - Yes    Do you have a Health Care Directive?: No: Advance care planning reviewed with patient; information given to patient to review.    Current providers sharing in care for this patient include:   Patient Care Team:  Carter Malone MD as PCP - General (Family Practice)    The following health maintenance items are reviewed in Epic and correct as of today:  Health Maintenance   Topic Date Due     FALL RISK ASSESSMENT  11/17/2018     CMP Q1 YR  11/19/2019     LIPID MONITORING Q1 YEAR  11/19/2019     MICROALBUMIN Q1 YEAR  11/19/2019     PHQ-2 Q1 YR  11/19/2019     COLONOSCOPY Q10 YR  05/10/2022     ADVANCE DIRECTIVE PLANNING Q5 YRS  11/19/2023     TETANUS IMMUNIZATION (SYSTEM ASSIGNED)  04/28/2026     PNEUMOCOCCAL  Completed     INFLUENZA VACCINE  Completed     AORTIC ANEURYSM SCREENING (SYSTEM ASSIGNED)  Completed     HEPATITIS C SCREENING  Addressed "     BP Readings from Last 3 Encounters:   11/19/18 90/62   11/17/17 134/72   10/19/16 108/68    Wt Readings from Last 3 Encounters:   11/19/18 185 lb 3.2 oz (84 kg)   11/17/17 196 lb 6.4 oz (89.1 kg)   10/19/16 219 lb 1.6 oz (99.4 kg)        Patient Active Problem List   Diagnosis     Hyperlipidemia LDL goal <100     GERD (gastroesophageal reflux disease)     Advanced directives, counseling/discussion     Microalbuminuria     CKD (chronic kidney disease) stage 2, GFR 60-89 ml/min     Erectile dysfunction, unspecified erectile dysfunction type     Coronary artery disease involving native coronary artery of native heart without angina pectoris     Abnormal cardiovascular stress test     Essential hypertension with goal blood pressure less than 130/80     Increasing prostate specific antigen level     Past Surgical History:   Procedure Laterality Date     CARDIAC SURGERY      triple by-pass 2001     COLONOSCOPY  4/2001    reported as normal--10 year follow up     COLONOSCOPY  5/10/2012    Procedure:COLONOSCOPY; screening colonoscopy; Surgeon:REN MAR; Location:[facility] OR     VASECTOMY         Social History   Substance Use Topics     Smoking status: Former Smoker     Packs/day: 1.00     Years: 15.00     Quit date: 4/2/1987     Smokeless tobacco: Never Used     Alcohol use No     Family History   Problem Relation Age of Onset     Diabetes Mother      HEART DISEASE Father 67     MI     Cancer Maternal Grandmother      Asthma No family hx of      C.A.D. No family hx of      Hypertension No family hx of      Cerebrovascular Disease No family hx of      Breast Cancer No family hx of      Cancer - colorectal No family hx of      Prostate Cancer No family hx of      Alcohol/Drug No family hx of      Allergies No family hx of      Alzheimer Disease No family hx of      Anesthesia Reaction No family hx of      Blood Disease No family hx of      Arthritis No family hx of      Cardiovascular No family hx of      Circulatory  No family hx of      Congenital Anomalies No family hx of      Connective Tissue Disorder No family hx of      Depression No family hx of      Eye Disorder No family hx of      GASTROINTESTINAL DISEASE No family hx of      Genitourinary Problems No family hx of      Genetic Disorder No family hx of      Endocrine Disease No family hx of      Gynecology No family hx of      Lipids No family hx of      Musculoskeletal Disorder No family hx of      Hearing Loss No family hx of      Family History Negative No family hx of      Thyroid Disease No family hx of      Respiratory No family hx of      Psychotic Disorder No family hx of      Osteoporosis No family hx of      Obesity No family hx of      Neurologic Disorder No family hx of      Coronary Artery Disease No family hx of      Hyperlipidemia No family hx of      Ovarian Cancer No family hx of      Other Cancer No family hx of      Mental Illness No family hx of      Known Genetic Syndrome No family hx of      Unknown/Adopted No family hx of      Anxiety Disorder No family hx of      Mental Illness No family hx of      Substance Abuse No family hx of      Colon Cancer No family hx of          Current Outpatient Prescriptions   Medication Sig Dispense Refill     aspirin 81 MG tablet Take 1 tablet by mouth daily.  3     atorvastatin (LIPITOR) 80 MG tablet TAKE 1 TABLET(80 MG) BY MOUTH DAILY 90 tablet 3     lisinopril (PRINIVIL/ZESTRIL) 10 MG tablet Take 1 tablet (10 mg) by mouth daily 90 tablet 3     metoprolol succinate (TOPROL-XL) 25 MG 24 hr tablet Take 0.5 tablets (12.5 mg) by mouth daily 45 tablet 3     sildenafil (VIAGRA) 100 MG tablet Take 0.5-1 tablets ( mg) by mouth daily as needed Take 30 min to 4 hours before intercourse.  Never use with nitroglycerin, terazosin or doxazosin. 36 tablet 4     LANsoprazole (PREVACID) 15 MG capsule Take 1 capsule (15 mg) by mouth daily Take 30-60 minutes before a meal. 90 capsule 1     No Known Allergies  Recent Labs  "  Lab Test  11/19/18   0849  11/17/17   0937  10/19/16   1037   11/09/12   0940   LDL  75  88  96   < >  71   HDL  60  51  50   < >  46   TRIG  75  158*  116   < >  72   ALT  30  29  35   < >  42   CR  1.13  1.15  1.07   < >  1.20   GFRESTIMATED  65  64  69   < >  62   GFRESTBLACK  78  77  84   < >  74   POTASSIUM  4.7  5.0  4.4   < >  4.4   TSH   --    --    --    --   1.39    < > = values in this interval not displayed.      Review of Systems  Constitutional, HEENT, cardiovascular, pulmonary, GI, , musculoskeletal, neuro, skin, endocrine and psych systems are negative, except as otherwise noted.    This document serves as a record of the services and decisions personally performed and made by Naima Petty CNP. It was created on his/her behalf by Monica Patiño, trained medical scribe. The creation of this document is based the provider's statements to the medical scribes.    Scribe Monica Patiño 8:30 AM, November 19, 2018  OBJECTIVE:   BP 90/62  Pulse (!) 48  Temp 97.3  F (36.3  C) (Temporal)  Resp 16  Ht 5' 8.5\" (1.74 m)  Wt 185 lb 3.2 oz (84 kg)  SpO2 99%  BMI 27.75 kg/m2 Estimated body mass index is 27.75 kg/(m^2) as calculated from the following:    Height as of this encounter: 5' 8.5\" (1.74 m).    Weight as of this encounter: 185 lb 3.2 oz (84 kg).     Physical Exam  GENERAL: healthy, alert and no distress  EYES: Eyes grossly normal to inspection, PERRL and conjunctivae and sclerae normal  HENT: ear canals and TM's normal, nose and mouth without ulcers or lesions  NECK: no adenopathy, no asymmetry, masses, or scars and thyroid normal to palpation  RESP: lungs clear to auscultation - no rales, rhonchi or wheezes  CV: regular rate and rhythm, normal S1 S2, no S3 or S4, no murmur, click or rub, no peripheral edema and peripheral pulses strong  ABDOMEN: soft, nontender, no hepatosplenomegaly, no masses and bowel sounds normal   (male): normal male genitalia without lesions or urethral discharge, no " hernia  RECTAL: normal sphincter tone, no rectal masses, prostate normal size, smooth, nontender without nodules or masses  MS: no gross musculoskeletal defects noted, no edema  SKIN: no suspicious lesions or rashes  NEURO: Normal strength and tone, mentation intact and speech normal  PSYCH: mentation appears normal, affect normal/bright    Diagnostic Test Results:  No results found for this or any previous visit (from the past 24 hour(s)).    ASSESSMENT / PLAN:       ICD-10-CM    1. Medicare annual wellness visit, subsequent Z00.00 Lipid panel reflex to direct LDL Fasting     Hemoglobin     Prostate spec antigen screen   2. Coronary artery disease involving native heart without angina pectoris, unspecified vessel or lesion type I25.10 COMPREHENSIVE METABOLIC PANEL     Lipid panel reflex to direct LDL Fasting     CARDIOLOGY EVAL ADULT REFERRAL   3. Advanced directives, counseling/discussion Z71.89    4. CKD (chronic kidney disease) stage 2, GFR 60-89 ml/min N18.2 COMPREHENSIVE METABOLIC PANEL     Albumin Random Urine Quantitative with Creat Ratio     lisinopril (PRINIVIL/ZESTRIL) 10 MG tablet     Hemoglobin     OFFICE/OUTPT VISIT,EST,LEVL III   5. Essential hypertension with goal blood pressure less than 130/80 I10 COMPREHENSIVE METABOLIC PANEL     lisinopril (PRINIVIL/ZESTRIL) 10 MG tablet     Hemoglobin     metoprolol succinate (TOPROL-XL) 25 MG 24 hr tablet     OFFICE/OUTPT VISIT,EST,LEVL III     DISCONTINUED: metoprolol succinate (TOPROL-XL) 25 MG 24 hr tablet   6. Erectile dysfunction, unspecified erectile dysfunction type N52.9 sildenafil (VIAGRA) 100 MG tablet     OFFICE/OUTPT VISIT,EST,LEVL III   7. Encounter for screening for malignant neoplasm of prostate  Z12.5 Prostate spec antigen screen     Prostate spec antigen screen   8. Increasing prostate specific antigen level R97.20 Prostate spec antigen screen     OFFICE/OUTPT VISIT,EST,LEVL III     Physical exam completed. Updated routine fasting screening  "blood work; will notify with results.     Cardiology referral provided for patient to schedule due to patients CAD and last follow up with cardiology in 2016.     Patient reports that his medications are well tolerated with no reported side effects. Plan to continue on current dose; Refill provided.     Patient has already received his influenza vaccination this season. Discussed Shingrex with patient; he will look into receiving it through his pharmacy benefit.     Follow up with PCP in 1 year for annual physical exam with routine fasting blood work or prn.      End of Life Planning:  Patient currently has an advanced directive: No.  I have verified the patient's ablity to prepare an advanced directive/make health care decisions.  Literature was provided to assist patient in preparing an advanced directive.    Note- Rising PSA_ consulted with urology, repeat lab in 6 months.     COUNSELING:  Reviewed preventive health counseling, as reflected in patient instructions       Regular exercise       Healthy diet/nutrition       Safe sex practices/STD prevention       Hepatitis C screening       HIV screening for high risk patient       Colon cancer screening       Prostate cancer screening       Osteoporosis Prevention/Bone Health    BP Readings from Last 1 Encounters:   11/19/18 90/62     Estimated body mass index is 27.75 kg/(m^2) as calculated from the following:    Height as of this encounter: 5' 8.5\" (1.74 m).    Weight as of this encounter: 185 lb 3.2 oz (84 kg).    Weight management plan: Discussed healthy diet and exercise guidelines and patient will follow up in 12 months in clinic to re-evaluate.     reports that he quit smoking about 31 years ago. He has a 15.00 pack-year smoking history. He has never used smokeless tobacco.    Appropriate preventive services were discussed with this patient, including applicable screening as appropriate for cardiovascular disease, diabetes, osteopenia/osteoporosis, and " glaucoma.  As appropriate for age/gender, discussed screening for colorectal cancer, prostate cancer, breast cancer, and cervical cancer. Checklist reviewing preventive services available has been given to the patient.    Reviewed patients plan of care and provided an AVS. The Intermediate Care Plan (routine screening as documented in Health Maintenance) for Kendrick meets the Care Plan requirement. This Care Plan has been established and reviewed with the Patient.    Counseling Resources:  ATP IV Guidelines  Pooled Cohorts Equation Calculator  Breast Cancer Risk Calculator  FRAX Risk Assessment  ICSI Preventive Guidelines  Dietary Guidelines for Americans, 2010  [employer] MyPlate  ASA Prophylaxis  Lung CA Screening    The information in this document, created by the medical scribe for me, accurately reflects the services I personally performed and the decisions made by me. I have reviewed and approved this document for accuracy prior to leaving the patient care area.  Naima Petty CNP  8:30 AM, 11/19/18    JOSE Vallejo CNP  [facility]

## 2018-11-14 NOTE — PATIENT INSTRUCTIONS
Services Typically covered by Medicare Recommended Completed   Vaccines    Pneumonoccol    Influenza    Hepatitis B (if medium/high risk)     Once for patients after age 65    Yearly  Medium/high risk factors:    End Stage Kidney Disease    Hemophiliacs who received Factor XIII or IX concentrates    Clients of institutions for developmentally disabled    Persons who live in same house as a Hepatitis B carrier    Homosexual men    Illicit injectable drug users    Health care workers     Mammogram Covered: One-time screen between age 35-39, annually for age 40+     Pap and Pelvic Exam Covered: Annually if  high risk,  or childbearing age with abnormal Pap in last 3 years.  Q24 months for all other women     Prostate Cancer Screening    Digital rectal exam    PSA Covered: Annually for all men > age 50     Corolrectal Cancer Screening Screening colonoscopy every 10 years, more often for high risk patients     Diabetes Self-Management Training Requires referral by treating physician for patient with diabetes     Diabetes Screening    Fasting blood sugar or glucose tolerance test   Once yearly, twice yearly if prediabetic     Cardiovascular Screening Blood Tests    Total Cholesterol    HDL    Triglycerides Every 5 years     Medical Nutrition Therapy for Diabetes or Renal Disease Requires referral by treating physician for patient with diabetes or kidney disease     Glaucoma Screening Annually for patients with one of the following risk factors:    Diabetes Mellitus    Family history of Glaucoma    -American age 50 and over    -American age 65 and over     Bone Mass Measurement Every 24 months if one of the following risk factors:    Estrogen deficiency    Vertebral abnormalities on x-ray indicative of Osteoporosis, Osteopenia, or Vertebral fracture    Receiving/expected to receive the equivalent of at least 5 mg of Prednisone per day for > 3 months    Hyperparathyroidism    Patient being monitored for  response to Osteoporosis Therapy     One-time AAA screen  Must be ordered as part of Medicare IPPE   Any patient with a family history of AAA    Males Age 65-75, with history of smoking at least 100 cigarettes in lifetime     Smoking Cessation Counseling Beneficiaries who use tobacco are eligible to receive 2 cessation attempts per year; each attempt includes maximum of 4 sessions     HIV Screening Annually for beneficiaries at increased risk:       Increased risk for HIV infection is defined in the  National Coverage Determinations (NCD) Manual,  Publication 100-03 Sections 190.14 (diagnostic) and 210.7 (screening). See http://www.cms.gov/manuals/downloads/aut226u4_Bitv7.pdf and http://www.cms.gov/manuals/downloads/ouf111q5_Lomt2.pdf on the Internet.  Three times per pregnancy for beneficiaries who are pregnant.     Future Annual Wellness Visit Annually, for all beneficiaries.       Preventive Health Recommendations:     See your health care provider every year to    Review health changes.     Discuss preventive care.      Review your medicines if your doctor has prescribed any.    Talk with your health care provider about whether you should have a test to screen for prostate cancer (PSA).    Every 3 years, have a diabetes test (fasting glucose). If you are at risk for diabetes, you should have this test more often.    Every 5 years, have a cholesterol test. Have this test more often if you are at risk for high cholesterol or heart disease.     Every 10 years, have a colonoscopy. Or, have a yearly FIT test (stool test). These exams will check for colon cancer.    Talk to with your health care provider about screening for Abdominal Aortic Aneurysm if you have a family history of AAA or have a history of smoking.  Shots:     Get a flu shot each year.     Get a tetanus shot every 10 years.     Talk to your doctor about your pneumonia vaccines. There are now two you should receive - Pneumovax (PPSV 23) and Prevnar (PCV  13).    Talk to your pharmacist about a shingles vaccine.     Talk to your doctor about the hepatitis B vaccine.  Nutrition:     Eat at least 5 servings of fruits and vegetables each day.     Eat whole-grain bread, whole-wheat pasta and brown rice instead of white grains and rice.     Get adequate Calcium and Vitamin D.   Lifestyle    Exercise for at least 150 minutes a week (30 minutes a day, 5 days a week). This will help you control your weight and prevent disease.     Limit alcohol to one drink per day.     No smoking.     Wear sunscreen to prevent skin cancer.     See your dentist every six months for an exam and cleaning.     See your eye doctor every 1 to 2 years to screen for conditions such as glaucoma, macular degeneration and cataracts.    Personalized Prevention Plan  You are due for the preventive services outlined below.  Your care team is available to assist you in scheduling these services.  If you have already completed any of these items, please share that information with your care team to update in your medical record.    Health Maintenance Due   Topic Date Due     Discuss Advance Directive Planning  11/09/2017     Comprehensive Metabolic Lab - yearly  11/17/2018     FALL RISK ASSESSMENT  11/17/2018     Cholesterol Lab - yearly  11/17/2018     Microalbumin Lab - yearly  11/17/2018     Depression Assessment 2 - yearly  11/17/2018

## 2018-11-19 ENCOUNTER — OFFICE VISIT (OUTPATIENT)
Dept: FAMILY MEDICINE | Facility: CLINIC | Age: 68
End: 2018-11-19
Payer: MEDICARE

## 2018-11-19 VITALS
SYSTOLIC BLOOD PRESSURE: 90 MMHG | WEIGHT: 185.2 LBS | TEMPERATURE: 97.3 F | RESPIRATION RATE: 16 BRPM | OXYGEN SATURATION: 99 % | BODY MASS INDEX: 27.43 KG/M2 | HEART RATE: 48 BPM | HEIGHT: 69 IN | DIASTOLIC BLOOD PRESSURE: 62 MMHG

## 2018-11-19 DIAGNOSIS — N52.9 ERECTILE DYSFUNCTION, UNSPECIFIED ERECTILE DYSFUNCTION TYPE: ICD-10-CM

## 2018-11-19 DIAGNOSIS — N18.2 CKD (CHRONIC KIDNEY DISEASE) STAGE 2, GFR 60-89 ML/MIN: ICD-10-CM

## 2018-11-19 DIAGNOSIS — I25.10 CORONARY ARTERY DISEASE INVOLVING NATIVE HEART WITHOUT ANGINA PECTORIS, UNSPECIFIED VESSEL OR LESION TYPE: ICD-10-CM

## 2018-11-19 DIAGNOSIS — Z12.5 ENCOUNTER FOR SCREENING FOR MALIGNANT NEOPLASM OF PROSTATE: ICD-10-CM

## 2018-11-19 DIAGNOSIS — R97.20 INCREASING PROSTATE SPECIFIC ANTIGEN LEVEL: ICD-10-CM

## 2018-11-19 DIAGNOSIS — I10 ESSENTIAL HYPERTENSION WITH GOAL BLOOD PRESSURE LESS THAN 130/80: ICD-10-CM

## 2018-11-19 DIAGNOSIS — Z71.89 ADVANCED DIRECTIVES, COUNSELING/DISCUSSION: ICD-10-CM

## 2018-11-19 DIAGNOSIS — Z00.00 MEDICARE ANNUAL WELLNESS VISIT, SUBSEQUENT: Primary | ICD-10-CM

## 2018-11-19 LAB
ALBUMIN SERPL-MCNC: 3.9 G/DL (ref 3.4–5)
ALP SERPL-CCNC: 65 U/L (ref 40–150)
ALT SERPL W P-5'-P-CCNC: 30 U/L (ref 0–70)
ANION GAP SERPL CALCULATED.3IONS-SCNC: 4 MMOL/L (ref 3–14)
AST SERPL W P-5'-P-CCNC: 29 U/L (ref 0–45)
BILIRUB SERPL-MCNC: 0.6 MG/DL (ref 0.2–1.3)
BUN SERPL-MCNC: 26 MG/DL (ref 7–30)
CALCIUM SERPL-MCNC: 8.9 MG/DL (ref 8.5–10.1)
CHLORIDE SERPL-SCNC: 106 MMOL/L (ref 94–109)
CHOLEST SERPL-MCNC: 150 MG/DL
CO2 SERPL-SCNC: 28 MMOL/L (ref 20–32)
CREAT SERPL-MCNC: 1.13 MG/DL (ref 0.66–1.25)
CREAT UR-MCNC: 113 MG/DL
GFR SERPL CREATININE-BSD FRML MDRD: 65 ML/MIN/1.7M2
GLUCOSE SERPL-MCNC: 97 MG/DL (ref 70–99)
HDLC SERPL-MCNC: 60 MG/DL
HGB BLD-MCNC: 14.7 G/DL (ref 13.3–17.7)
LDLC SERPL CALC-MCNC: 75 MG/DL
MICROALBUMIN UR-MCNC: 17 MG/L
MICROALBUMIN/CREAT UR: 15.04 MG/G CR (ref 0–17)
NONHDLC SERPL-MCNC: 90 MG/DL
POTASSIUM SERPL-SCNC: 4.7 MMOL/L (ref 3.4–5.3)
PROT SERPL-MCNC: 7.8 G/DL (ref 6.8–8.8)
PSA SERPL-ACNC: 2.82 UG/L (ref 0–4)
SODIUM SERPL-SCNC: 138 MMOL/L (ref 133–144)
TRIGL SERPL-MCNC: 75 MG/DL

## 2018-11-19 PROCEDURE — 80053 COMPREHEN METABOLIC PANEL: CPT | Performed by: NURSE PRACTITIONER

## 2018-11-19 PROCEDURE — 99213 OFFICE O/P EST LOW 20 MIN: CPT | Mod: 25 | Performed by: NURSE PRACTITIONER

## 2018-11-19 PROCEDURE — 85018 HEMOGLOBIN: CPT | Performed by: NURSE PRACTITIONER

## 2018-11-19 PROCEDURE — 82043 UR ALBUMIN QUANTITATIVE: CPT | Performed by: NURSE PRACTITIONER

## 2018-11-19 PROCEDURE — 36415 COLL VENOUS BLD VENIPUNCTURE: CPT | Performed by: NURSE PRACTITIONER

## 2018-11-19 PROCEDURE — G0103 PSA SCREENING: HCPCS | Performed by: NURSE PRACTITIONER

## 2018-11-19 PROCEDURE — 80061 LIPID PANEL: CPT | Performed by: NURSE PRACTITIONER

## 2018-11-19 PROCEDURE — G0438 PPPS, INITIAL VISIT: HCPCS | Performed by: NURSE PRACTITIONER

## 2018-11-19 RX ORDER — METOPROLOL SUCCINATE 25 MG/1
25 TABLET, EXTENDED RELEASE ORAL DAILY
Qty: 90 TABLET | Refills: 3 | Status: SHIPPED | OUTPATIENT
Start: 2018-11-19 | End: 2018-11-19

## 2018-11-19 RX ORDER — LISINOPRIL 10 MG/1
10 TABLET ORAL DAILY
Qty: 90 TABLET | Refills: 3 | Status: SHIPPED | OUTPATIENT
Start: 2018-11-19 | End: 2019-11-21

## 2018-11-19 RX ORDER — SILDENAFIL 100 MG/1
50-100 TABLET, FILM COATED ORAL DAILY PRN
Qty: 36 TABLET | Refills: 4 | Status: SHIPPED | OUTPATIENT
Start: 2018-11-19 | End: 2019-11-21

## 2018-11-19 RX ORDER — METOPROLOL SUCCINATE 25 MG/1
12.5 TABLET, EXTENDED RELEASE ORAL DAILY
Qty: 45 TABLET | Refills: 3 | Status: SHIPPED | OUTPATIENT
Start: 2018-11-19 | End: 2019-11-21

## 2018-11-19 ASSESSMENT — ACTIVITIES OF DAILY LIVING (ADL): CURRENT_FUNCTION: NO ASSISTANCE NEEDED

## 2018-11-19 ASSESSMENT — PAIN SCALES - GENERAL: PAINLEVEL: NO PAIN (0)

## 2018-11-19 NOTE — MR AVS SNAPSHOT
After Visit Summary   11/19/2018    Kendrick Castellon    MRN: [id number]           Patient Information     Date Of Birth          1950        Visit Information        Provider Department      11/19/2018 8:20 AM Naima Petty APRN [facility] [place]        Today's Diagnoses     Coronary artery disease involving native heart without angina pectoris, unspecified vessel or lesion type    -  1    Medicare annual wellness visit, subsequent        Advanced directives, counseling/discussion        CKD (chronic kidney disease) stage 2, GFR 60-89 ml/min        Essential hypertension with goal blood pressure less than 130/80        Erectile dysfunction, unspecified erectile dysfunction type        Encounter for screening for malignant neoplasm of prostate           Care Instructions          Services Typically covered by Medicare Recommended Completed   Vaccines    Pneumonoccol    Influenza    Hepatitis B (if medium/high risk)     Once for patients after age 65    Yearly  Medium/high risk factors:    End Stage Kidney Disease    Hemophiliacs who received Factor XIII or IX concentrates    Clients of institutions for developmentally disabled    Persons who live in same house as a Hepatitis B carrier    Homosexual men    Illicit injectable drug users    Health care workers     Mammogram Covered: One-time screen between age 35-39, annually for age 40+     Pap and Pelvic Exam Covered: Annually if  high risk,  or childbearing age with abnormal Pap in last 3 years.  Q24 months for all other women     Prostate Cancer Screening    Digital rectal exam    PSA Covered: Annually for all men > age 50     Corolrectal Cancer Screening Screening colonoscopy every 10 years, more often for high risk patients     Diabetes Self-Management Training Requires referral by treating physician for patient with diabetes     Diabetes Screening    Fasting blood sugar or glucose tolerance test   Once yearly, twice yearly if  prediabetic     Cardiovascular Screening Blood Tests    Total Cholesterol    HDL    Triglycerides Every 5 years     Medical Nutrition Therapy for Diabetes or Renal Disease Requires referral by treating physician for patient with diabetes or kidney disease     Glaucoma Screening Annually for patients with one of the following risk factors:    Diabetes Mellitus    Family history of Glaucoma    -American age 50 and over    -American age 65 and over     Bone Mass Measurement Every 24 months if one of the following risk factors:    Estrogen deficiency    Vertebral abnormalities on x-ray indicative of Osteoporosis, Osteopenia, or Vertebral fracture    Receiving/expected to receive the equivalent of at least 5 mg of Prednisone per day for > 3 months    Hyperparathyroidism    Patient being monitored for response to Osteoporosis Therapy     One-time AAA screen  Must be ordered as part of Medicare IPPE   Any patient with a family history of AAA    Males Age 65-75, with history of smoking at least 100 cigarettes in lifetime     Smoking Cessation Counseling Beneficiaries who use tobacco are eligible to receive 2 cessation attempts per year; each attempt includes maximum of 4 sessions     HIV Screening Annually for beneficiaries at increased risk:       Increased risk for HIV infection is defined in the  National Coverage Determinations (NCD) Manual,  Publication 100-03 Sections 190.14 (diagnostic) and 210.7 (screening). See http://www.cms.gov/manuals/downloads/ujp690a1_Bqdu8.pdf and http://www.cms.gov/manuals/downloads/gtj510w6_Mwui2.pdf on the Internet.  Three times per pregnancy for beneficiaries who are pregnant.     Future Annual Wellness Visit Annually, for all beneficiaries.       Preventive Health Recommendations:     See your health care provider every year to    Review health changes.     Discuss preventive care.      Review your medicines if your doctor has prescribed any.    Talk with your health care  provider about whether you should have a test to screen for prostate cancer (PSA).    Every 3 years, have a diabetes test (fasting glucose). If you are at risk for diabetes, you should have this test more often.    Every 5 years, have a cholesterol test. Have this test more often if you are at risk for high cholesterol or heart disease.     Every 10 years, have a colonoscopy. Or, have a yearly FIT test (stool test). These exams will check for colon cancer.    Talk to with your health care provider about screening for Abdominal Aortic Aneurysm if you have a family history of AAA or have a history of smoking.  Shots:     Get a flu shot each year.     Get a tetanus shot every 10 years.     Talk to your doctor about your pneumonia vaccines. There are now two you should receive - Pneumovax (PPSV 23) and Prevnar (PCV 13).    Talk to your pharmacist about a shingles vaccine.     Talk to your doctor about the hepatitis B vaccine.  Nutrition:     Eat at least 5 servings of fruits and vegetables each day.     Eat whole-grain bread, whole-wheat pasta and brown rice instead of white grains and rice.     Get adequate Calcium and Vitamin D.   Lifestyle    Exercise for at least 150 minutes a week (30 minutes a day, 5 days a week). This will help you control your weight and prevent disease.     Limit alcohol to one drink per day.     No smoking.     Wear sunscreen to prevent skin cancer.     See your dentist every six months for an exam and cleaning.     See your eye doctor every 1 to 2 years to screen for conditions such as glaucoma, macular degeneration and cataracts.    Personalized Prevention Plan  You are due for the preventive services outlined below.  Your care team is available to assist you in scheduling these services.  If you have already completed any of these items, please share that information with your care team to update in your medical record.    Health Maintenance Due   Topic Date Due     Discuss Advance  Directive Planning  11/09/2017     Comprehensive Metabolic Lab - yearly  11/17/2018     FALL RISK ASSESSMENT  11/17/2018     Cholesterol Lab - yearly  11/17/2018     Microalbumin Lab - yearly  11/17/2018     Depression Assessment 2 - yearly  11/17/2018             Follow-ups after your visit        Additional Services     CARDIOLOGY EVAL ADULT REFERRAL       Preferred location:  [facility] [phone]   [email or web address]    Please be aware that coverage of these services is subject to the terms and limitations of your health insurance plan.  Call member services at your health plan with any benefit or coverage questions.      Please bring the following to your appointment:  Any x-rays, CTs or MRIs which have been performed. Contact the facility where they were done to arrange for  prior to your scheduled appointment.    List of current medications  This referral request   Any documents/labs given to you for this referral                  Who to contact     If you have questions or need follow up information about today's clinic visit or your schedule please contact [facility] directly at [phone].  Normal or non-critical lab and imaging results will be communicated to you by [employer], letter or phone within 4 business days after the clinic has received the results. If you do not hear from us within 7 days, please contact the clinic through [employer] or phone. If you have a critical or abnormal lab result, we will notify you by phone as soon as possible.  Submit refill requests through [employer] or call your pharmacy and they will forward the refill request to us. Please allow 3 business days for your refill to be completed.          Additional Information About Your Visit        [employer] Information     [employer] gives you secure access to your electronic health record. If you see a primary care provider, you can also send  "messages to your care team and make appointments. If you have questions, please call your primary care clinic.  If you do not have a primary care provider, please call [phone] and they will assist you.        Care EveryWhere ID     This is your Care EveryWhere ID. This could be used by other organizations to access your [facility] medical records  [id number]        Your Vitals Were     Pulse Temperature Respirations Height Pulse Oximetry BMI (Body Mass Index)    48 97.3  F (36.3  C) (Temporal) 16 5' 8.5\" (1.74 m) 99% 27.75 kg/m2       Blood Pressure from Last 3 Encounters:   11/19/18 90/62   11/17/17 134/72   10/19/16 108/68    Weight from Last 3 Encounters:   11/19/18 185 lb 3.2 oz (84 kg)   11/17/17 196 lb 6.4 oz (89.1 kg)   10/19/16 219 lb 1.6 oz (99.4 kg)              We Performed the Following     Albumin Random Urine Quantitative with Creat Ratio     CARDIOLOGY EVAL ADULT REFERRAL     COMPREHENSIVE METABOLIC PANEL     Hemoglobin     Lipid panel reflex to direct LDL Fasting     Prostate spec antigen screen          Today's Medication Changes          These changes are accurate as of 11/19/18  8:48 AM.  If you have any questions, ask your nurse or doctor.               Start taking these medicines.        Dose/Directions    metoprolol succinate 25 MG 24 hr tablet   Commonly known as:  TOPROL-XL   Used for:  Essential hypertension with goal blood pressure less than 130/80   Started by:  Naima Petty APRN CNP        Dose:  12.5 mg   Take 0.5 tablets (12.5 mg) by mouth daily   Quantity:  45 tablet   Refills:  3         These medicines have changed or have updated prescriptions.        Dose/Directions    lisinopril 10 MG tablet   Commonly known as:  PRINIVIL/ZESTRIL   This may have changed:  See the new instructions.   Used for:  CKD (chronic kidney disease) stage 2, GFR 60-89 ml/min, Essential hypertension with goal blood pressure less than 130/80   Changed by:  Naima Petty APRN CNP        Dose:  10 mg "   Take 1 tablet (10 mg) by mouth daily   Quantity:  90 tablet   Refills:  3            Where to get your medicines      These medications were sent to [id number] - [place], [place] - [address]  [address], [place] [address]     Phone:  [phone]     lisinopril 10 MG tablet    metoprolol succinate 25 MG 24 hr tablet         Some of these will need a paper prescription and others can be bought over the counter.  Ask your nurse if you have questions.     Bring a paper prescription for each of these medications     sildenafil 100 MG tablet                Primary Care Provider Office Phone # Fax #    Carter Malone [phone] [address] [facility] [address]        Equal Access to Services     BERTRAND LAWSON : Hadii ev william hadasho Sotashiali, waaxda luqadaha, qaybta kaalmada adeegyada, juany munoz. So [facility] [phone].    ATENCIÓN: Si habla español, tiene a edwards disposición servicios gratuitos de asistencia lingüística. [facility] [phone].    We comply with applicable federal civil rights laws and [place] laws. We do not discriminate on the basis of race, color, national origin, age, disability, sex, sexual orientation, or gender identity.            Thank you!     Thank you for choosing [facility]  for your care. Our goal is always to provide you with excellent care. Hearing back from our patients is one way we can continue to improve our services. Please take a few minutes to complete the written survey that you may receive in the mail after your visit with us. Thank you!             Your Updated Medication List - Protect others around you: Learn how to safely use, store and throw away your medicines at www.disposemymeds.org.          This list is accurate as of 11/19/18  8:48 AM.  Always use your most recent med list.                   Brand Name Dispense Instructions for use Diagnosis    aspirin 81 MG  tablet      Take 1 tablet by mouth daily.        atorvastatin 80 MG tablet    LIPITOR    90 tablet    TAKE 1 TABLET(80 MG) BY MOUTH DAILY    Coronary artery disease involving native coronary artery of native heart without angina pectoris, Hyperlipidemia LDL goal <100       lisinopril 10 MG tablet    PRINIVIL/ZESTRIL    90 tablet    Take 1 tablet (10 mg) by mouth daily    CKD (chronic kidney disease) stage 2, GFR 60-89 ml/min, Essential hypertension with goal blood pressure less than 130/80       metoprolol succinate 25 MG 24 hr tablet    TOPROL-XL    45 tablet    Take 0.5 tablets (12.5 mg) by mouth daily    Essential hypertension with goal blood pressure less than 130/80       PREVACID 15 MG CR capsule   Generic drug:  LANsoprazole     90 capsule    Take 1 capsule (15 mg) by mouth daily Take 30-60 minutes before a meal.        sildenafil 100 MG tablet    VIAGRA    36 tablet    Take 0.5-1 tablets ( mg) by mouth daily as needed Take 30 min to 4 hours before intercourse.  Never use with nitroglycerin, terazosin or doxazosin.    Erectile dysfunction, unspecified erectile dysfunction type

## 2018-11-20 PROBLEM — R97.20 INCREASING PROSTATE SPECIFIC ANTIGEN LEVEL: Status: ACTIVE | Noted: 2018-11-20

## 2018-12-17 ENCOUNTER — PRE VISIT (OUTPATIENT)
Dept: CARDIOLOGY | Facility: CLINIC | Age: 68
End: 2018-12-17

## 2018-12-17 NOTE — TELEPHONE ENCOUNTER
New patient visit with Dr. Blank 12/19/18 to establish care. Hx of CAD, CABG x3 in 2001. Cannot find anywhere in documentation where this was done. Called pt, he had CABG done in [place], [place] at what is he believes is now [facility] (was not called this before per pt). Messaged medical records to request  CABG records. POLLO Bar 12:30 PM 12/17/18

## 2018-12-18 NOTE — TELEPHONE ENCOUNTER
Message from Medical Records, need DIANA for [facility] before they will give CABG report from 2001. DIANA placed with chart prep for visit 12/19/18. POLLO Bar 2:56 PM 12/18/18

## 2018-12-19 ENCOUNTER — OFFICE VISIT (OUTPATIENT)
Dept: CARDIOLOGY | Facility: CLINIC | Age: 68
End: 2018-12-19
Attending: NURSE PRACTITIONER
Payer: COMMERCIAL

## 2018-12-19 VITALS
HEIGHT: 69 IN | DIASTOLIC BLOOD PRESSURE: 64 MMHG | BODY MASS INDEX: 27.37 KG/M2 | SYSTOLIC BLOOD PRESSURE: 114 MMHG | WEIGHT: 184.8 LBS | HEART RATE: 76 BPM

## 2018-12-19 DIAGNOSIS — E78.5 HYPERLIPIDEMIA LDL GOAL <100: ICD-10-CM

## 2018-12-19 DIAGNOSIS — I10 ESSENTIAL HYPERTENSION WITH GOAL BLOOD PRESSURE LESS THAN 130/80: Primary | ICD-10-CM

## 2018-12-19 DIAGNOSIS — I25.10 CORONARY ARTERY DISEASE INVOLVING NATIVE CORONARY ARTERY OF NATIVE HEART WITHOUT ANGINA PECTORIS: ICD-10-CM

## 2018-12-19 PROCEDURE — 93005 ELECTROCARDIOGRAM TRACING: CPT | Performed by: INTERNAL MEDICINE

## 2018-12-19 PROCEDURE — 99214 OFFICE O/P EST MOD 30 MIN: CPT | Performed by: INTERNAL MEDICINE

## 2018-12-19 ASSESSMENT — MIFFLIN-ST. JEOR: SCORE: 1595.69

## 2018-12-19 NOTE — PROGRESS NOTES
CARDIOLOGY CLINIC CONSULTATION    REASON FOR CONSULT: Coronary artery disease status post bypass in 2001    PRIMARY CARE PHYSICIAN:  Naima Petty    HISTORY OF PRESENT ILLNESS:  This is a very pleasant 68-year-old male who was last seen at [facility] in 2016.  The patient has a history of three-vessel bypass surgery in 2001 done at a unknown hospital in [place].  Patient thinks it may be [facility] or it may be [facility].  We do not have records of the bypass surgery.  It appears that he has got scars on both his radials and he says both radials were used and he did not have any vein grafts he was from his leg.  He was seen in 2016 and at that point he had a stress test which showed very mild anterolateral ischemia.  He was completely asymptomatic back then and as such Dr. Joshua then decided to conservatively manage. She has left the practice and he is seeing me as a new patient continuing follow-up with me.      Patient says he is completely asymptomatic from a cardiovascular standpoint.  In the last 18 years he has had no issues.  No angiograms or MIs in the interim that he reports.  He feels functionally very active.  He walked 4 miles this morning and that scan of routine for him.  Denies shortness of breath, palpitations, syncope, presyncope.  Recently he was feeling a little lightheaded at his PCP visit and was noted to have low blood pressures and his metoprolol dose was decreased somewhat.  Otherwise he takes aspirin and statin metoprolol and lisinopril.  Lives a healthy lifestyle and does not smoke.    PAST MEDICAL HISTORY:  Past Medical History:   Diagnosis Date     CAD, multiple vessel 2001    Post bypass     Hyperlipidemia LDL goal < 100      Hypertension        MEDICATIONS:  Current Outpatient Medications   Medication     aspirin 81 MG tablet     atorvastatin (LIPITOR) 80 MG tablet     LANsoprazole (PREVACID) 15 MG capsule     lisinopril (PRINIVIL/ZESTRIL)  10 MG tablet     metoprolol succinate (TOPROL-XL) 25 MG 24 hr tablet     sildenafil (VIAGRA) 100 MG tablet     No current facility-administered medications for this visit.        ALLERGIES:  No Known Allergies    SOCIAL HISTORY:  I have reviewed this patient's social history and updated it with pertinent information if needed. Kendrick Castellon  reports that he quit smoking about 31 years ago. He has a 15.00 pack-year smoking history. he has never used smokeless tobacco. He reports that he does not drink alcohol or use drugs.    FAMILY HISTORY:  I have reviewed this patient's family history and updated it with pertinent information if needed.   Family History   Problem Relation Age of Onset     Diabetes Mother      Heart Disease Father 67        MI     Cancer Maternal Grandmother      Asthma No family hx of      C.A.D. No family hx of      Hypertension No family hx of      Cerebrovascular Disease No family hx of      Breast Cancer No family hx of      Cancer - colorectal No family hx of      Prostate Cancer No family hx of      Alcohol/Drug No family hx of      Allergies No family hx of      Alzheimer Disease No family hx of      Anesthesia Reaction No family hx of      Blood Disease No family hx of      Arthritis No family hx of      Cardiovascular No family hx of      Circulatory No family hx of      Congenital Anomalies No family hx of      Connective Tissue Disorder No family hx of      Depression No family hx of      Eye Disorder No family hx of      Gastrointestinal Disease No family hx of      Genitourinary Problems No family hx of      Genetic Disorder No family hx of      Endocrine Disease No family hx of      Gynecology No family hx of      Lipids No family hx of      Musculoskeletal Disorder No family hx of      Hearing Loss No family hx of      Family History Negative No family hx of      Thyroid Disease No family hx of      Respiratory No family hx of      Psychotic Disorder No family hx of       Osteoporosis No family hx of      Obesity No family hx of      Neurologic Disorder No family hx of      Coronary Artery Disease No family hx of      Hyperlipidemia No family hx of      Ovarian Cancer No family hx of      Other Cancer No family hx of      Mental Illness No family hx of      Known Genetic Syndrome No family hx of      Unknown/Adopted No family hx of      Anxiety Disorder No family hx of      Mental Illness No family hx of      Substance Abuse No family hx of      Colon Cancer No family hx of        REVIEW OF SYSTEMS:  Skin:  Negative     Eyes:  Positive for glasses  ENT:  Negative    Respiratory:  Negative    Cardiovascular:  Negative    Gastroenterology: Negative    Genitourinary:  Negative    Musculoskeletal:  Negative    Neurologic:  Negative    Psychiatric:  Negative    Heme/Lymph/Imm:  Negative    Endocrine:  Negative        PHYSICAL EXAM:      BP: 114/64 Pulse: 76            Vital Signs with Ranges  Pulse:  [76] 76  BP: (114)/(64) 114/64  184 lbs 12.8 oz    Constitutional: awake, alert, no distress  Eyes: PERRL, sclera nonicteric  ENT: trachea midline  Respiratory: Clear to auscultation bilaterally with good air entry.  Cardiovascular: Normal cardiac sounds.  No JVD no edema.  No murmur rubs or gallops  GI: nondistended, nontender, bowel sounds present  Lymph/Hematologic: no lymphadenopathy  Skin: dry, no rash  Musculoskeletal: good muscle tone, strength 5/5 in upper and lower extremities  Neurologic: no focal deficits  Neuropsychiatric: appropriate affact    DATA:  Labs: Pertinent cardiac labs reviewed    Echocardiogram: Last study was from 2016 which showed normal LV function and mild aortic sclerosis.    EKG: Normal sinus rhythm    ASSESSMENT:  Very pleasant 68-year-old male with a history of triple-vessel bypass surgery in 2001.  Since then he has been doing very well.  Routine care with a cardiologist today.  Denies any symptoms.    RECOMMENDATIONS:  1. Continue aspirin, current dose of  beta-blockers and lisinopril.  Continue high intensity statin.  Looking at his LDL trend his LDL is coming down very nicely since 2015 but it is still a 75.  His HDL is also improving with ongoing exercise.  We discussed about these findings.  We will repeat another lipid panel in the next 6 months.  If his LDL is still above 70 then a low dose of ezetimibe can be added to his regimen.  2. I will see him back in a year with an echocardiogram.  He will see a nurse practitioner in 6 months to discuss with his lipid results.  3. We will be happy to see him sooner should there be any change in clinical status or should he develop any cardiac symptoms.  Otherwise continue aerobic activity as tolerated, and continue living a healthy. lifestyle.    Monique Blank MD, [facility]  Cardiology - [facility]  December 19, 2018

## 2018-12-19 NOTE — LETTER
12/19/2018    Naima Petty, APRN CNP  [address] [facility] [address]    RE: Kendrick Castellon       Dear Colleague,    I had the pleasure of seeing Kendrickgabriella Castellon in the [facility].    CARDIOLOGY CLINIC CONSULTATION    REASON FOR CONSULT: Coronary artery disease status post bypass in 2001    PRIMARY CARE PHYSICIAN:  Naima Petty    HISTORY OF PRESENT ILLNESS:  This is a very pleasant 68-year-old male who was last seen at [facility] in 2016.  The patient has a history of three-vessel bypass surgery in 2001 done at a [facility] hospital in [place].  Patient thinks it may be [facility] or it may be [facility].  We do not have records of the bypass surgery.  It appears that he has got scars on both his radials and he says both radials were used and he did not have any vein grafts he was from his leg.  He was seen in 2016 and at that point he had a stress test which showed very mild anterolateral ischemia.  He was completely asymptomatic back then and as such Dr. Joshua then decided to conservatively manage. She has left the practice and he is seeing me as a new patient continuing follow-up with me.      Patient says he is completely asymptomatic from a cardiovascular standpoint.  In the last 18 years he has had no issues.  No angiograms or MIs in the interim that he reports.  He feels functionally very active.  He walked 4 miles this morning and that scan of routine for him.  Denies shortness of breath, palpitations, syncope, presyncope.  Recently he was feeling a little lightheaded at his PCP visit and was noted to have low blood pressures and his metoprolol dose was decreased somewhat.  Otherwise he takes aspirin and statin metoprolol and lisinopril.  Lives a healthy lifestyle and does not smoke.    PAST MEDICAL HISTORY:  Past Medical History:   Diagnosis Date     CAD, multiple vessel 2001    Post bypass     Hyperlipidemia LDL goal < 100       Hypertension        MEDICATIONS:  Current Outpatient Medications   Medication     aspirin 81 MG tablet     atorvastatin (LIPITOR) 80 MG tablet     LANsoprazole (PREVACID) 15 MG capsule     lisinopril (PRINIVIL/ZESTRIL) 10 MG tablet     metoprolol succinate (TOPROL-XL) 25 MG 24 hr tablet     sildenafil (VIAGRA) 100 MG tablet     No current facility-administered medications for this visit.        ALLERGIES:  No Known Allergies    SOCIAL HISTORY:  I have reviewed this patient's social history and updated it with pertinent information if needed. Kendrick Castellon  reports that he quit smoking about 31 years ago. He has a 15.00 pack-year smoking history. he has never used smokeless tobacco. He reports that he does not drink alcohol or use drugs.    FAMILY HISTORY:  I have reviewed this patient's family history and updated it with pertinent information if needed.   Family History   Problem Relation Age of Onset     Diabetes Mother      Heart Disease Father 67        MI     Cancer Maternal Grandmother      Asthma No family hx of      C.A.D. No family hx of      Hypertension No family hx of      Cerebrovascular Disease No family hx of      Breast Cancer No family hx of      Cancer - colorectal No family hx of      Prostate Cancer No family hx of      Alcohol/Drug No family hx of      Allergies No family hx of      Alzheimer Disease No family hx of      Anesthesia Reaction No family hx of      Blood Disease No family hx of      Arthritis No family hx of      Cardiovascular No family hx of      Circulatory No family hx of      Congenital Anomalies No family hx of      Connective Tissue Disorder No family hx of      Depression No family hx of      Eye Disorder No family hx of      Gastrointestinal Disease No family hx of      Genitourinary Problems No family hx of      Genetic Disorder No family hx of      Endocrine Disease No family hx of      Gynecology No family hx of      Lipids No family hx of      Musculoskeletal  Disorder No family hx of      Hearing Loss No family hx of      Family History Negative No family hx of      Thyroid Disease No family hx of      Respiratory No family hx of      Psychotic Disorder No family hx of      Osteoporosis No family hx of      Obesity No family hx of      Neurologic Disorder No family hx of      Coronary Artery Disease No family hx of      Hyperlipidemia No family hx of      Ovarian Cancer No family hx of      Other Cancer No family hx of      Mental Illness No family hx of      Known Genetic Syndrome No family hx of      Unknown/Adopted No family hx of      Anxiety Disorder No family hx of      Mental Illness No family hx of      Substance Abuse No family hx of      Colon Cancer No family hx of        REVIEW OF SYSTEMS:  Skin:  Negative     Eyes:  Positive for glasses  ENT:  Negative    Respiratory:  Negative    Cardiovascular:  Negative    Gastroenterology: Negative    Genitourinary:  Negative    Musculoskeletal:  Negative    Neurologic:  Negative    Psychiatric:  Negative    Heme/Lymph/Imm:  Negative    Endocrine:  Negative        PHYSICAL EXAM:      BP: 114/64 Pulse: 76            Vital Signs with Ranges  Pulse:  [76] 76  BP: (114)/(64) 114/64  184 lbs 12.8 oz    Constitutional: awake, alert, no distress  Eyes: PERRL, sclera nonicteric  ENT: trachea midline  Respiratory: Clear to auscultation bilaterally with good air entry.  Cardiovascular: Normal cardiac sounds.  No JVD no edema.  No murmur rubs or gallops  GI: nondistended, nontender, bowel sounds present  Lymph/Hematologic: no lymphadenopathy  Skin: dry, no rash  Musculoskeletal: good muscle tone, strength 5/5 in upper and lower extremities  Neurologic: no focal deficits  Neuropsychiatric: appropriate affact    DATA:  Labs: Pertinent cardiac labs reviewed    Echocardiogram: Last study was from 2016 which showed normal LV function and mild aortic sclerosis.    EKG: Normal sinus rhythm    ASSESSMENT:  Very pleasant 68-year-old male  with a history of triple-vessel bypass surgery in 2001.  Since then he has been doing very well.  Routine care with a cardiologist today.  Denies any symptoms.    RECOMMENDATIONS:  1. Continue aspirin, current dose of beta-blockers and lisinopril.  Continue high intensity statin.  Looking at his LDL trend his LDL is coming down very nicely since 2015 but it is still a 75.  His HDL is also improving with ongoing exercise.  We discussed about these findings.  We will repeat another lipid panel in the next 6 months.  If his LDL is still above 70 then a low dose of ezetimibe can be added to his regimen.  2. I will see him back in a year with an echocardiogram.  He will see a nurse practitioner in 6 months to discuss with his lipid results.  3. We will be happy to see him sooner should there be any change in clinical status or should he develop any cardiac symptoms.  Otherwise continue aerobic activity as tolerated, and continue living a healthy. lifestyle.    Monique Blank MD, [facility]  Cardiology - [facility]  December 19, 2018      Thank you for allowing me to participate in the care of your patient.    Sincerely,     Monique Blank MD     [facility]

## 2019-01-21 DIAGNOSIS — I25.10 CORONARY ARTERY DISEASE INVOLVING NATIVE CORONARY ARTERY OF NATIVE HEART WITHOUT ANGINA PECTORIS: ICD-10-CM

## 2019-01-21 DIAGNOSIS — E78.5 HYPERLIPIDEMIA LDL GOAL <100: ICD-10-CM

## 2019-01-22 RX ORDER — ATORVASTATIN CALCIUM 80 MG/1
TABLET, FILM COATED ORAL
Qty: 90 TABLET | Refills: 1 | Status: SHIPPED | OUTPATIENT
Start: 2019-01-22 | End: 2019-08-08

## 2019-01-22 NOTE — TELEPHONE ENCOUNTER
Pending Prescriptions:                       Disp   Refills    atorvastatin (LIPITOR) 80 MG tablet [Phar*90 tab*1            Sig: TAKE ONE TABLET BY MOUTH ONE TIME DAILY    Recent Labs   Lab Test 11/19/18  0849 11/17/17  0937  04/10/15  0915 03/24/14  1205   CHOL 150 171   < > 191 154   HDL 60 51   < > 47 43   LDL 75 88   < > 80 87   TRIG 75 158*   < > 320* 117   CHOLHDLRATIO  --   --   --  4.1 3.5    < > = values in this interval not displayed.     Last ov 11/19/2018    Prescription approved per [facility] Refill Protocol.  José Manuel López, RN, BSN

## 2019-06-07 ENCOUNTER — TELEPHONE (OUTPATIENT)
Dept: FAMILY MEDICINE | Facility: CLINIC | Age: 69
End: 2019-06-07

## 2019-06-07 NOTE — TELEPHONE ENCOUNTER
Panel Management Review    Summary:    Patient is due/failing the following:   PSA    Action needed:   Patient needs non-fasting lab only appointment    Type of outreach:    Phone, spoke to patient.  Scheduled patient for lab on 6/11/19 for repeat PSA.    Questions for provider review:    None                                                                                                                                    Sonia Schrader CMA (AAMA)       Chart routed to Care Team .      Patient has the following on his problem list:     Hypertension   Last three blood pressure readings:  BP Readings from Last 3 Encounters:   12/19/18 114/64   11/19/18 90/62   11/17/17 134/72     Blood pressure: Passed    HTN Guidelines:  Less than 140/90      Composite cancer screening  Chart review shows that this patient is due/due soon for the following None

## 2019-06-11 DIAGNOSIS — R97.20 INCREASING PROSTATE SPECIFIC ANTIGEN LEVEL: ICD-10-CM

## 2019-06-11 DIAGNOSIS — Z12.5 ENCOUNTER FOR SCREENING FOR MALIGNANT NEOPLASM OF PROSTATE: ICD-10-CM

## 2019-06-11 LAB — PSA SERPL-ACNC: 2.61 UG/L (ref 0–4)

## 2019-06-11 PROCEDURE — 36415 COLL VENOUS BLD VENIPUNCTURE: CPT | Performed by: NURSE PRACTITIONER

## 2019-06-11 PROCEDURE — G0103 PSA SCREENING: HCPCS | Performed by: NURSE PRACTITIONER

## 2019-06-24 ENCOUNTER — OFFICE VISIT (OUTPATIENT)
Dept: CARDIOLOGY | Facility: CLINIC | Age: 69
End: 2019-06-24
Attending: INTERNAL MEDICINE
Payer: COMMERCIAL

## 2019-06-24 VITALS
HEART RATE: 57 BPM | SYSTOLIC BLOOD PRESSURE: 133 MMHG | WEIGHT: 196 LBS | BODY MASS INDEX: 29.03 KG/M2 | HEIGHT: 69 IN | DIASTOLIC BLOOD PRESSURE: 84 MMHG

## 2019-06-24 DIAGNOSIS — I25.10 CORONARY ARTERY DISEASE INVOLVING NATIVE CORONARY ARTERY OF NATIVE HEART WITHOUT ANGINA PECTORIS: Primary | ICD-10-CM

## 2019-06-24 DIAGNOSIS — I25.10 CORONARY ARTERY DISEASE INVOLVING NATIVE CORONARY ARTERY OF NATIVE HEART WITHOUT ANGINA PECTORIS: ICD-10-CM

## 2019-06-24 DIAGNOSIS — E78.5 HYPERLIPIDEMIA LDL GOAL <70: ICD-10-CM

## 2019-06-24 DIAGNOSIS — I10 ESSENTIAL HYPERTENSION WITH GOAL BLOOD PRESSURE LESS THAN 130/80: ICD-10-CM

## 2019-06-24 LAB
ALT SERPL W P-5'-P-CCNC: 9 U/L (ref 5–30)
CHOLEST SERPL-MCNC: 169 MG/DL
HDLC SERPL-MCNC: 52 MG/DL
LDLC SERPL CALC-MCNC: 94 MG/DL
NONHDLC SERPL-MCNC: 117 MG/DL
TRIGL SERPL-MCNC: 113 MG/DL

## 2019-06-24 PROCEDURE — 84460 ALANINE AMINO (ALT) (SGPT): CPT | Performed by: INTERNAL MEDICINE

## 2019-06-24 PROCEDURE — 99214 OFFICE O/P EST MOD 30 MIN: CPT | Performed by: NURSE PRACTITIONER

## 2019-06-24 PROCEDURE — 36415 COLL VENOUS BLD VENIPUNCTURE: CPT | Performed by: INTERNAL MEDICINE

## 2019-06-24 PROCEDURE — 80061 LIPID PANEL: CPT | Performed by: INTERNAL MEDICINE

## 2019-06-24 RX ORDER — EZETIMIBE 10 MG/1
10 TABLET ORAL DAILY
Qty: 30 TABLET | Refills: 3 | Status: SHIPPED | OUTPATIENT
Start: 2019-06-24 | End: 2019-10-21

## 2019-06-24 ASSESSMENT — MIFFLIN-ST. JEOR: SCORE: 1641.55

## 2019-06-24 NOTE — LETTER
6/24/2019    Naima Petty, APRN CNP  [address] [facility] [address]    RE: Kendrick Castellon       Dear Colleague,    I had the pleasure of seeing Kendrick Castellon in the [facility].    Cardiology Clinic Progress Note  Kendrick Castellon MRN# [id number]   YOB: 1950 Age: 69 year old     Reason For Visit:  Review of  Lipid results   Primary Cardiologist:   Dr. Blank          History of Presenting Illness:    Kendrick Castellon is a pleasant 69 year old patient who carries a past medical history significant for coronary artery disease status post bypass surgery in 2001, hyperlipidemia, and hypertension.    He was last seen on 12/19/2018 for routine follow-up.  He offered no cardiac complaint.  His LDL has not been at goal despite high potency statin. A  6 month  lipid panel was recommended and he presents to the office today for review of results.      He is feeling well on a cardiac standpoint, denies chest pain, shortness of breath, PND, orthopnea, presyncope, syncope, edema, heart racing, or palpitations.  He has noticed a 12 pound weight gain over the last 6 months which he attributes to poor dietary choices.    His last ischemic evaluation in 2016 demonstrated a very small area of distal anterior lateral ischemia with a normal ejection fraction of 66%.  No further work-up was recommended as he remains asymptomatic.  Echocardiogram in 2016 showed a normal ejection fraction with mild aortic valve sclerosis.  He is scheduled for a follow-up echo tomorrow.    Blood pressure is well controlled at 133/84, managed well on lisinopril and metoprolol.  Lipids today showed a total cholesterol of 169, HDL 52, LDL 94, triglycerides 113, on high-dose atorvastatin.  BMI 29.36    He remains very active walking on a daily basis  Remains compliant with all medications.                   Assessment and Plan:     1.  Coronary artery disease -no symptoms of ischemia.  Last ischemic work-up in  2016 demonstrated a very small area of distal anterior lateral ischemia with a normal ejection fraction of 66%.  Scheduled for a follow-up echocardiogram tomorrow.  Continue on aspirin, metoprolol, lisinopril, and statin.    2.  Hypertension -well-controlled  3.  Hyperlipidemia -LDL not at goal 94, will add Zetia 10 mg daily to high-dose Lipitor.  Follow-up fasting lipid panel in 8 weeks.  Encouraged heart healthy diet. He voices concern of the cost of Zetia. Should the cost be unaffordable, recommend switching Lipitor to Crestor.                 Thank you for allowing me to participate in this delightful patient's care. I have recommended he follow up in 6 months with Dr. Blank .       Padmini Maynard, APRN, CNP          Review of Systems:   Review of Systems:  Skin:  Negative     Eyes:  Positive for glasses  ENT:  Negative    Respiratory:  Negative    Cardiovascular:  Negative    Gastroenterology: Negative    Genitourinary:  Negative    Musculoskeletal:  Negative    Neurologic:  Negative    Psychiatric:  Negative    Heme/Lymph/Imm:  Negative    Endocrine:  Negative                Physical Exam:     GEN:  In general, this is a well nourished male in no acute distress.  HEENT:  Pupils equal, round. Sclerae nonicteric. Clear oropharynx. Mucous membranes moist.  NECK: Supple, no masses appreciated. Trachea midline. No JVD   C/V:  Regular rate and rhythm, No murmur, rub or gallop. No S3 or RV heave.   RESP: Respirations are unlabored. No use of accessory muscles. Clear to auscultation bilaterally without wheezing, rales, or rhonchi.  GI: Abdomen soft, nontender, nondistended. No HSM appreciated.   EXTREM: No LE edema. No cyanosis or clubbing.  NEURO: Alert and oriented, cooperative. No obvious focal deficits.   PSYCH: Normal affect.  SKIN: Warm and dry. No rashes or petechiae appreciated.          Past Medical History:     Past Medical History:   Diagnosis Date     CKD (chronic kidney disease) stage 2, GFR 60-89 ml/min  11/12/2013     Coronary artery disease involving native coronary artery of native heart without angina pectoris 12/7/2015     Erectile dysfunction, unspecified erectile dysfunction type 11/30/2015     Essential hypertension with goal blood pressure less than 130/80 11/17/2017     GERD (gastroesophageal reflux disease) 4/5/2012     Hyperlipidemia LDL goal <100 4/5/2012     Hypertension goal BP (blood pressure) < 140/90 4/5/2012              Past Surgical History:     Past Surgical History:   Procedure Laterality Date     CARDIAC SURGERY  12/05/2002    CABG x 3: STATON to LAD, left radial to circ & right radial to RCA     COLONOSCOPY  4/2001    reported as normal--10 year follow up     COLONOSCOPY  5/10/2012    Procedure:COLONOSCOPY; screening colonoscopy; Surgeon:REN MAR; Location:[facility] OR     VASECTOMY                Allergies:   Patient has no known allergies.       Data:   All laboratory data reviewed:    LAST CHOLESTEROL:  Lab Results   Component Value Date    CHOL 169 06/24/2019     Lab Results   Component Value Date    HDL 52 06/24/2019     Lab Results   Component Value Date    LDL 94 06/24/2019     Lab Results   Component Value Date    TRIG 113 06/24/2019     Lab Results   Component Value Date    CHOLHDLRATIO 4.1 04/10/2015       LAST BMP:  Lab Results   Component Value Date     11/19/2018      Lab Results   Component Value Date    POTASSIUM 4.7 11/19/2018     Lab Results   Component Value Date    CHLORIDE 106 11/19/2018     Lab Results   Component Value Date    TED 8.9 11/19/2018     Lab Results   Component Value Date    CO2 28 11/19/2018     Lab Results   Component Value Date    BUN 26 11/19/2018     Lab Results   Component Value Date    CR 1.13 11/19/2018     Lab Results   Component Value Date    GLC 97 11/19/2018       LAST CBC:  Lab Results   Component Value Date    WBC 8.4 11/17/2017     Lab Results   Component Value Date    RBC 5.23 11/17/2017     Lab Results   Component Value Date    HGB  14.7 11/19/2018     Lab Results   Component Value Date    HCT 47.6 11/17/2017     Lab Results   Component Value Date    MCV 91 11/17/2017     Lab Results   Component Value Date    MCH 30.0 11/17/2017     Lab Results   Component Value Date    MCHC 33.0 11/17/2017     Lab Results   Component Value Date    RDW 13.5 11/17/2017     Lab Results   Component Value Date     11/17/2017         Thank you for allowing me to participate in the care of your patient.    Sincerely,     JOSE Rouse [facility]

## 2019-06-24 NOTE — PATIENT INSTRUCTIONS
Thanks for coming into [facility] today.    Doing well on a cardiac standpoint  Reviewed results of cholesterol panel.  LDL not at goal, will add Zetia in addition to Atorvastatin.   If Zetia is too costly will consider switching to Crestor    Follow up in 8 weeks with cholesterol panel.     Please call my nurse at [phone] with any questions or concerns.    Scheduling phone number: [phone]  Reminder: Please bring in all current medications, over the counter supplements and vitamin bottles to your next appointment.

## 2019-06-24 NOTE — PROGRESS NOTES
Cardiology Clinic Progress Note  Kendrick Castellon MRN# [id number]   YOB: 1950 Age: 69 year old     Reason For Visit:  Review of  Lipid results   Primary Cardiologist:   Dr. Blank          History of Presenting Illness:    Kendrick Castellon is a pleasant 69 year old patient who carries a past medical history significant for coronary artery disease status post bypass surgery in 2001, hyperlipidemia, and hypertension.    He was last seen on 12/19/2018 for routine follow-up.  He offered no cardiac complaint.  His LDL has not been at goal despite high potency statin. A  6 month  lipid panel was recommended and he presents to the office today for review of results.      He is feeling well on a cardiac standpoint, denies chest pain, shortness of breath, PND, orthopnea, presyncope, syncope, edema, heart racing, or palpitations.  He has noticed a 12 pound weight gain over the last 6 months which he attributes to poor dietary choices.    His last ischemic evaluation in 2016 demonstrated a very small area of distal anterior lateral ischemia with a normal ejection fraction of 66%.  No further work-up was recommended as he remains asymptomatic.  Echocardiogram in 2016 showed a normal ejection fraction with mild aortic valve sclerosis.  He is scheduled for a follow-up echo tomorrow.    Blood pressure is well controlled at 133/84, managed well on lisinopril and metoprolol.  Lipids today showed a total cholesterol of 169, HDL 52, LDL 94, triglycerides 113, on high-dose atorvastatin.  BMI 29.36    He remains very active walking on a daily basis  Remains compliant with all medications.                   Assessment and Plan:     1.  Coronary artery disease -no symptoms of ischemia.  Last ischemic work-up in 2016 demonstrated a very small area of distal anterior lateral ischemia with a normal ejection fraction of 66%.  Scheduled for a follow-up echocardiogram tomorrow.  Continue on aspirin, metoprolol, lisinopril, and  statin.    2.  Hypertension -well-controlled  3.  Hyperlipidemia -LDL not at goal 94, will add Zetia 10 mg daily to high-dose Lipitor.  Follow-up fasting lipid panel in 8 weeks.  Encouraged heart healthy diet. He voices concern of the cost of Zetia. Should the cost be unaffordable, recommend switching Lipitor to Crestor.                 Thank you for allowing me to participate in this delightful patient's care. I have recommended he follow up in 6 months with Dr. Blank .       Padmini Maynard, APRN, CNP          Review of Systems:   Review of Systems:  Skin:  Negative     Eyes:  Positive for glasses  ENT:  Negative    Respiratory:  Negative    Cardiovascular:  Negative    Gastroenterology: Negative    Genitourinary:  Negative    Musculoskeletal:  Negative    Neurologic:  Negative    Psychiatric:  Negative    Heme/Lymph/Imm:  Negative    Endocrine:  Negative                Physical Exam:     GEN:  In general, this is a well nourished male in no acute distress.  HEENT:  Pupils equal, round. Sclerae nonicteric. Clear oropharynx. Mucous membranes moist.  NECK: Supple, no masses appreciated. Trachea midline. No JVD   C/V:  Regular rate and rhythm, No murmur, rub or gallop. No S3 or RV heave.   RESP: Respirations are unlabored. No use of accessory muscles. Clear to auscultation bilaterally without wheezing, rales, or rhonchi.  GI: Abdomen soft, nontender, nondistended. No HSM appreciated.   EXTREM: No LE edema. No cyanosis or clubbing.  NEURO: Alert and oriented, cooperative. No obvious focal deficits.   PSYCH: Normal affect.  SKIN: Warm and dry. No rashes or petechiae appreciated.          Past Medical History:     Past Medical History:   Diagnosis Date     CKD (chronic kidney disease) stage 2, GFR 60-89 ml/min 11/12/2013     Coronary artery disease involving native coronary artery of native heart without angina pectoris 12/7/2015     Erectile dysfunction, unspecified erectile dysfunction type 11/30/2015     Essential  hypertension with goal blood pressure less than 130/80 11/17/2017     GERD (gastroesophageal reflux disease) 4/5/2012     Hyperlipidemia LDL goal <100 4/5/2012     Hypertension goal BP (blood pressure) < 140/90 4/5/2012              Past Surgical History:     Past Surgical History:   Procedure Laterality Date     CARDIAC SURGERY  12/05/2002    CABG x 3: STATON to LAD, left radial to circ & right radial to RCA     COLONOSCOPY  4/2001    reported as normal--10 year follow up     COLONOSCOPY  5/10/2012    Procedure:COLONOSCOPY; screening colonoscopy; Surgeon:REN MAR; Location:[facility] OR     VASECTOMY                Allergies:   Patient has no known allergies.       Data:   All laboratory data reviewed:    LAST CHOLESTEROL:  Lab Results   Component Value Date    CHOL 169 06/24/2019     Lab Results   Component Value Date    HDL 52 06/24/2019     Lab Results   Component Value Date    LDL 94 06/24/2019     Lab Results   Component Value Date    TRIG 113 06/24/2019     Lab Results   Component Value Date    CHOLHDLRATIO 4.1 04/10/2015       LAST BMP:  Lab Results   Component Value Date     11/19/2018      Lab Results   Component Value Date    POTASSIUM 4.7 11/19/2018     Lab Results   Component Value Date    CHLORIDE 106 11/19/2018     Lab Results   Component Value Date    TED 8.9 11/19/2018     Lab Results   Component Value Date    CO2 28 11/19/2018     Lab Results   Component Value Date    BUN 26 11/19/2018     Lab Results   Component Value Date    CR 1.13 11/19/2018     Lab Results   Component Value Date    GLC 97 11/19/2018       LAST CBC:  Lab Results   Component Value Date    WBC 8.4 11/17/2017     Lab Results   Component Value Date    RBC 5.23 11/17/2017     Lab Results   Component Value Date    HGB 14.7 11/19/2018     Lab Results   Component Value Date    HCT 47.6 11/17/2017     Lab Results   Component Value Date    MCV 91 11/17/2017     Lab Results   Component Value Date    MCH 30.0 11/17/2017     Lab  Results   Component Value Date    MCHC 33.0 11/17/2017     Lab Results   Component Value Date    RDW 13.5 11/17/2017     Lab Results   Component Value Date     11/17/2017

## 2019-06-25 ENCOUNTER — HOSPITAL ENCOUNTER (OUTPATIENT)
Dept: CARDIOLOGY | Facility: CLINIC | Age: 69
Discharge: HOME | End: 2019-06-25
Attending: INTERNAL MEDICINE | Admitting: INTERNAL MEDICINE
Payer: COMMERCIAL

## 2019-06-25 DIAGNOSIS — I25.10 CORONARY ARTERY DISEASE INVOLVING NATIVE CORONARY ARTERY OF NATIVE HEART WITHOUT ANGINA PECTORIS: ICD-10-CM

## 2019-06-25 PROCEDURE — 93306 TTE W/DOPPLER COMPLETE: CPT

## 2019-06-25 PROCEDURE — 93306 TTE W/DOPPLER COMPLETE: CPT | Mod: 26 | Performed by: INTERNAL MEDICINE

## 2019-08-08 DIAGNOSIS — I25.10 CORONARY ARTERY DISEASE INVOLVING NATIVE CORONARY ARTERY OF NATIVE HEART WITHOUT ANGINA PECTORIS: ICD-10-CM

## 2019-08-08 DIAGNOSIS — E78.5 HYPERLIPIDEMIA LDL GOAL <100: ICD-10-CM

## 2019-08-08 RX ORDER — ATORVASTATIN CALCIUM 80 MG/1
TABLET, FILM COATED ORAL
Qty: 90 TABLET | Refills: 0 | Status: SHIPPED | OUTPATIENT
Start: 2019-08-08 | End: 2019-11-02

## 2019-08-08 NOTE — TELEPHONE ENCOUNTER
Prescription approved per [facility] Refill Protocol.  José Manuel López, RN, BSN

## 2019-08-26 DIAGNOSIS — E78.5 HYPERLIPIDEMIA LDL GOAL <70: ICD-10-CM

## 2019-08-26 LAB
CHOLEST SERPL-MCNC: 141 MG/DL
HDLC SERPL-MCNC: 53 MG/DL
LDLC SERPL CALC-MCNC: 68 MG/DL
NONHDLC SERPL-MCNC: 88 MG/DL
TRIGL SERPL-MCNC: 101 MG/DL

## 2019-08-26 PROCEDURE — 80061 LIPID PANEL: CPT | Performed by: INTERNAL MEDICINE

## 2019-08-26 PROCEDURE — 36415 COLL VENOUS BLD VENIPUNCTURE: CPT | Performed by: INTERNAL MEDICINE

## 2019-10-21 DIAGNOSIS — E78.5 HYPERLIPIDEMIA LDL GOAL <70: ICD-10-CM

## 2019-10-21 RX ORDER — EZETIMIBE 10 MG/1
10 TABLET ORAL DAILY
Qty: 90 TABLET | Refills: 3 | Status: SHIPPED | OUTPATIENT
Start: 2019-10-21 | End: 2020-08-17

## 2019-11-02 DIAGNOSIS — I25.10 CORONARY ARTERY DISEASE INVOLVING NATIVE CORONARY ARTERY OF NATIVE HEART WITHOUT ANGINA PECTORIS: ICD-10-CM

## 2019-11-02 DIAGNOSIS — E78.5 HYPERLIPIDEMIA LDL GOAL <100: ICD-10-CM

## 2019-11-05 RX ORDER — ATORVASTATIN CALCIUM 80 MG/1
TABLET, FILM COATED ORAL
Qty: 90 TABLET | Refills: 0 | Status: SHIPPED | OUTPATIENT
Start: 2019-11-05 | End: 2019-11-21

## 2019-11-05 NOTE — TELEPHONE ENCOUNTER
Pending Prescriptions:                       Disp   Refills    atorvastatin (LIPITOR) 80 MG tablet [Phar*90 tab*0            Sig: TAKE ONE TABLET BY MOUTH ONE TIME DAILY    Prescription approved per [facility] Refill Protocol.    Claudine Rico, RN, BSN

## 2019-11-21 ENCOUNTER — OFFICE VISIT (OUTPATIENT)
Dept: FAMILY MEDICINE | Facility: CLINIC | Age: 69
End: 2019-11-21
Payer: MEDICARE

## 2019-11-21 VITALS
DIASTOLIC BLOOD PRESSURE: 62 MMHG | OXYGEN SATURATION: 95 % | HEIGHT: 69 IN | BODY MASS INDEX: 30.07 KG/M2 | TEMPERATURE: 98.2 F | RESPIRATION RATE: 16 BRPM | HEART RATE: 62 BPM | WEIGHT: 203 LBS | SYSTOLIC BLOOD PRESSURE: 114 MMHG

## 2019-11-21 DIAGNOSIS — I25.10 CORONARY ARTERY DISEASE INVOLVING NATIVE CORONARY ARTERY OF NATIVE HEART WITHOUT ANGINA PECTORIS: ICD-10-CM

## 2019-11-21 DIAGNOSIS — K21.9 GASTROESOPHAGEAL REFLUX DISEASE, ESOPHAGITIS PRESENCE NOT SPECIFIED: ICD-10-CM

## 2019-11-21 DIAGNOSIS — Z12.5 ENCOUNTER FOR SCREENING FOR MALIGNANT NEOPLASM OF PROSTATE: ICD-10-CM

## 2019-11-21 DIAGNOSIS — Z00.00 ENCOUNTER FOR MEDICARE ANNUAL WELLNESS EXAM: Primary | ICD-10-CM

## 2019-11-21 DIAGNOSIS — N18.2 CKD (CHRONIC KIDNEY DISEASE) STAGE 2, GFR 60-89 ML/MIN: ICD-10-CM

## 2019-11-21 DIAGNOSIS — I10 ESSENTIAL HYPERTENSION WITH GOAL BLOOD PRESSURE LESS THAN 130/80: ICD-10-CM

## 2019-11-21 DIAGNOSIS — N52.9 ERECTILE DYSFUNCTION, UNSPECIFIED ERECTILE DYSFUNCTION TYPE: ICD-10-CM

## 2019-11-21 DIAGNOSIS — E78.5 HYPERLIPIDEMIA LDL GOAL <100: ICD-10-CM

## 2019-11-21 LAB
ALBUMIN SERPL-MCNC: 3.9 G/DL (ref 3.4–5)
ALP SERPL-CCNC: 64 U/L (ref 40–150)
ALT SERPL W P-5'-P-CCNC: 34 U/L (ref 0–70)
ANION GAP SERPL CALCULATED.3IONS-SCNC: 5 MMOL/L (ref 3–14)
AST SERPL W P-5'-P-CCNC: 23 U/L (ref 0–45)
BILIRUB SERPL-MCNC: 0.4 MG/DL (ref 0.2–1.3)
BUN SERPL-MCNC: 21 MG/DL (ref 7–30)
CALCIUM SERPL-MCNC: 8.8 MG/DL (ref 8.5–10.1)
CHLORIDE SERPL-SCNC: 109 MMOL/L (ref 94–109)
CHOLEST SERPL-MCNC: 149 MG/DL
CO2 SERPL-SCNC: 25 MMOL/L (ref 20–32)
CREAT SERPL-MCNC: 1.06 MG/DL (ref 0.66–1.25)
CREAT UR-MCNC: 171 MG/DL
GFR SERPL CREATININE-BSD FRML MDRD: 71 ML/MIN/{1.73_M2}
GLUCOSE SERPL-MCNC: 95 MG/DL (ref 70–99)
HDLC SERPL-MCNC: 57 MG/DL
HGB BLD-MCNC: 15.3 G/DL (ref 13.3–17.7)
LDLC SERPL CALC-MCNC: 61 MG/DL
MICROALBUMIN UR-MCNC: 56 MG/L
MICROALBUMIN/CREAT UR: 32.63 MG/G CR (ref 0–17)
NONHDLC SERPL-MCNC: 92 MG/DL
POTASSIUM SERPL-SCNC: 4.3 MMOL/L (ref 3.4–5.3)
PROT SERPL-MCNC: 7.6 G/DL (ref 6.8–8.8)
PSA SERPL-ACNC: 2.12 UG/L (ref 0–4)
SODIUM SERPL-SCNC: 139 MMOL/L (ref 133–144)
TRIGL SERPL-MCNC: 156 MG/DL
TSH SERPL DL<=0.005 MIU/L-ACNC: 1.82 MU/L (ref 0.4–4)

## 2019-11-21 PROCEDURE — 80053 COMPREHEN METABOLIC PANEL: CPT | Performed by: NURSE PRACTITIONER

## 2019-11-21 PROCEDURE — 36415 COLL VENOUS BLD VENIPUNCTURE: CPT | Performed by: NURSE PRACTITIONER

## 2019-11-21 PROCEDURE — G0439 PPPS, SUBSEQ VISIT: HCPCS | Performed by: NURSE PRACTITIONER

## 2019-11-21 PROCEDURE — 84443 ASSAY THYROID STIM HORMONE: CPT | Performed by: NURSE PRACTITIONER

## 2019-11-21 PROCEDURE — 85018 HEMOGLOBIN: CPT | Performed by: NURSE PRACTITIONER

## 2019-11-21 PROCEDURE — 80061 LIPID PANEL: CPT | Performed by: NURSE PRACTITIONER

## 2019-11-21 PROCEDURE — 82043 UR ALBUMIN QUANTITATIVE: CPT | Performed by: NURSE PRACTITIONER

## 2019-11-21 PROCEDURE — 99214 OFFICE O/P EST MOD 30 MIN: CPT | Mod: 25 | Performed by: NURSE PRACTITIONER

## 2019-11-21 PROCEDURE — G0103 PSA SCREENING: HCPCS | Performed by: NURSE PRACTITIONER

## 2019-11-21 RX ORDER — ATORVASTATIN CALCIUM 80 MG/1
80 TABLET, FILM COATED ORAL DAILY
Qty: 90 TABLET | Refills: 3 | Status: SHIPPED | OUTPATIENT
Start: 2019-11-21 | End: 2021-01-25

## 2019-11-21 RX ORDER — SILDENAFIL 100 MG/1
50-100 TABLET, FILM COATED ORAL DAILY PRN
Qty: 36 TABLET | Refills: 4 | Status: SHIPPED | OUTPATIENT
Start: 2019-11-21 | End: 2020-09-27

## 2019-11-21 RX ORDER — METOPROLOL SUCCINATE 25 MG/1
12.5 TABLET, EXTENDED RELEASE ORAL DAILY
Qty: 45 TABLET | Refills: 3 | Status: SHIPPED | OUTPATIENT
Start: 2019-11-21 | End: 2021-01-04

## 2019-11-21 RX ORDER — MECOBALAMIN 5000 MCG
15 TABLET,DISINTEGRATING ORAL DAILY
Qty: 90 CAPSULE | Refills: 1 | Status: SHIPPED | OUTPATIENT
Start: 2019-11-21 | End: 2021-01-29

## 2019-11-21 RX ORDER — LISINOPRIL 10 MG/1
10 TABLET ORAL DAILY
Qty: 90 TABLET | Refills: 3 | Status: SHIPPED | OUTPATIENT
Start: 2019-11-21 | End: 2021-01-04

## 2019-11-21 ASSESSMENT — ENCOUNTER SYMPTOMS
COUGH: 0
DYSURIA: 0
PARESTHESIAS: 0
FREQUENCY: 1
NAUSEA: 0
WEAKNESS: 0
CHILLS: 0
DIZZINESS: 0
DIARRHEA: 0
ABDOMINAL PAIN: 0
JOINT SWELLING: 0
HEADACHES: 0
SORE THROAT: 0
NERVOUS/ANXIOUS: 0
PALPITATIONS: 0
HEARTBURN: 0
SHORTNESS OF BREATH: 0
CONSTIPATION: 0
HEMATURIA: 0
EYE PAIN: 0
ARTHRALGIAS: 0
FEVER: 0
HEMATOCHEZIA: 0
MYALGIAS: 1

## 2019-11-21 ASSESSMENT — PAIN SCALES - GENERAL: PAINLEVEL: NO PAIN (0)

## 2019-11-21 ASSESSMENT — ACTIVITIES OF DAILY LIVING (ADL): CURRENT_FUNCTION: NO ASSISTANCE NEEDED

## 2019-11-21 ASSESSMENT — MIFFLIN-ST. JEOR: SCORE: 1673.3

## 2019-11-21 NOTE — PROGRESS NOTES
"SUBJECTIVE:   Kendrick Castellon is a 69 year old male who presents for Preventive Visit.    Are you in the first 12 months of your Medicare coverage?  No    Healthy Habits:     In general, how would you rate your overall health?  Good    Frequency of exercise:  6-7 days/week    Duration of exercise:  15-30 minutes    Do you usually eat at least 4 servings of fruit and vegetables a day, include whole grains    & fiber and avoid regularly eating high fat or \"junk\" foods?  No    Taking medications regularly:  Yes    Medication side effects:  Muscle aches    Ability to successfully perform activities of daily living:  No assistance needed    Home Safety:  Throw rugs in the hallway and lack of grab bars in the bathroom    Hearing Impairment:  Difficulty understanding soft or whispered speech    In the past 6 months, have you been bothered by leaking of urine?  No    In general, how would you rate your overall mental or emotional health?  Good      PHQ-2 Total Score: 0    Additional concerns today:  No    Do you feel safe in your environment? Yes    Have you ever done Advance Care Planning? (For example, a Health Directive, POLST, or a discussion with a medical provider or your loved ones about your wishes): declined.     Fall risk  Fallen 2 or more times in the past year?: No  Any fall with injury in the past year?: No     Cognitive Screening   1) Repeat 3 items (Leader, Season, Table)    2) Clock draw: NORMAL  3) 3 item recall: Recalls 3 objects  Results: 3 items recalled: COGNITIVE IMPAIRMENT LESS LIKELY    Mini-CogTM Copyright TIT[employer]. Licensed by the author for use in [facility]; reprinted with permission (ede@.[facility]). All rights reserved.      Reviewed and updated as needed this visit by clinical staff  Tobacco  Allergies  Meds  Med Hx  Surg Hx  Fam Hx  Soc Hx      Reviewed and updated as needed this visit by Provider        Social History     Tobacco Use     Smoking status: Former Smoker     " "Packs/day: 1.00     Years: 15.00     Pack years: 15.00     Last attempt to quit: 1987     Years since quittin.6     Smokeless tobacco: Never Used   Substance Use Topics     Alcohol use: No     Alcohol/week: 11.7 standard drinks     Types: 14 Standard drinks or equivalent per week     If you drink alcohol do you typically have >3 drinks per day or >7 drinks per week? No    Alcohol Use 2019   Prescreen: >3 drinks/day or >7 drinks/week? No   Prescreen: >3 drinks/day or >7 drinks/week? -     Hyperlipidemia Follow-Up    Are you having any of the following symptoms? (Select all that apply)  Pain in calves when walking 1-2 blocks    Are you regularly taking any medication or supplement to lower your cholesterol?   Yes- gloria    Are you having muscle aches or other side effects that you think could be caused by your cholesterol lowering medication?  Yes- \"raji horse, cramping in my toes\"    Hypertension Follow-up    Do you check your blood pressure regularly outside of the clinic? No     Are you following a low salt diet? No    Are your blood pressures ever more than 140 on the top number (systolic) OR more   than 90 on the bottom number (diastolic), for example 140/90? No    Chronic Kidney Disease Follow-up    Do you take any over the counter pain medicine?: No       He relates that he had lost 30 pounds the last year, but he needs to get back on top of it. He relates its due to him having a sweet tooth and the holiday season. He states that he does not get calcium in his diet much. He states he only drinks milk in his daily cereal. He endorses that his diet is fairly good, but that he doesn't worry about it much as his wife is the one who worries about that at makes sure it is nutritious.     He denies taking his prevacid daily, but rather every other day as that seems reasonable to him. He endorses that if he comes off of it he does have symptoms, but it depends on the food he eats.     He states that " his dog wakes him up once a night, around 3 AM, so he will urinate then and go back to bed. He relates that if his dog didn't wake him up he would wake up once to urinate.     He reports that he is still working daily, but he will be retiring in the next 1.5 years. Denies chest pain, shortness of breath, bowel changes, or urinary changes.         Current providers sharing in care for this patient include:   Patient Care Team:  Naima Petty APRN CNP as PCP - General (Family Practice)  Naima Petty APRN CNP as Assigned PCP    The following health maintenance items are reviewed in Epic and correct as of today:  Health Maintenance   Topic Date Due     ZOSTER IMMUNIZATION (2 of 3) 12/28/2012     PHQ-2  01/01/2019     BMP  11/19/2019     CMP  11/19/2019     MICROALBUMIN  11/19/2019     FALL RISK ASSESSMENT  11/19/2019     MEDICARE ANNUAL WELLNESS VISIT  11/19/2019     LIPID  08/26/2020     COLONOSCOPY  05/10/2022     ADVANCE CARE PLANNING  11/19/2023     DTAP/TDAP/TD IMMUNIZATION (3 - Td) 04/28/2026     INFLUENZA VACCINE  Completed     PNEUMOCOCCAL IMMUNIZATION 65+ LOW/MEDIUM RISK  Completed     AORTIC ANEURYSM SCREENING (SYSTEM ASSIGNED)  Completed     HEPATITIS C SCREENING  Addressed     IPV IMMUNIZATION  Aged Out     MENINGITIS IMMUNIZATION  Aged Out     BP Readings from Last 3 Encounters:   11/21/19 114/62   06/24/19 133/84   12/19/18 114/64    Wt Readings from Last 3 Encounters:   11/21/19 92.1 kg (203 lb)   06/24/19 88.9 kg (196 lb)   12/19/18 83.8 kg (184 lb 12.8 oz)        Patient Active Problem List   Diagnosis     Hyperlipidemia LDL goal <100     GERD (gastroesophageal reflux disease)     Advanced directives, counseling/discussion     Microalbuminuria     CKD (chronic kidney disease) stage 2, GFR 60-89 ml/min     Erectile dysfunction, unspecified erectile dysfunction type     Coronary artery disease involving native coronary artery of native heart without angina pectoris     Abnormal cardiovascular stress  test     Essential hypertension with goal blood pressure less than 130/80     Increasing prostate specific antigen level     Past Surgical History:   Procedure Laterality Date     CARDIAC SURGERY  2002    CABG x 3: STATON to LAD, left radial to circ & right radial to RCA     COLONOSCOPY  2001    reported as normal--10 year follow up     COLONOSCOPY  5/10/2012    Procedure:COLONOSCOPY; screening colonoscopy; Surgeon:REN MAR; Location:[facility] OR     VASECTOMY         Social History     Tobacco Use     Smoking status: Former Smoker     Packs/day: 1.00     Years: 15.00     Pack years: 15.00     Last attempt to quit: 1987     Years since quittin.6     Smokeless tobacco: Never Used   Substance Use Topics     Alcohol use: No     Alcohol/week: 11.7 standard drinks     Types: 14 Standard drinks or equivalent per week     Family History   Problem Relation Age of Onset     Diabetes Mother      Heart Disease Father 67        MI     Cancer Maternal Grandmother      Asthma No family hx of      C.A.D. No family hx of      Hypertension No family hx of      Cerebrovascular Disease No family hx of      Breast Cancer No family hx of      Cancer - colorectal No family hx of      Prostate Cancer No family hx of      Alcohol/Drug No family hx of      Allergies No family hx of      Alzheimer Disease No family hx of      Anesthesia Reaction No family hx of      Blood Disease No family hx of      Arthritis No family hx of      Cardiovascular No family hx of      Circulatory No family hx of      Congenital Anomalies No family hx of      Connective Tissue Disorder No family hx of      Depression No family hx of      Eye Disorder No family hx of      Gastrointestinal Disease No family hx of      Genitourinary Problems No family hx of      Genetic Disorder No family hx of      Endocrine Disease No family hx of      Gynecology No family hx of      Lipids No family hx of      Musculoskeletal Disorder No family hx of       Hearing Loss No family hx of      Family History Negative No family hx of      Thyroid Disease No family hx of      Respiratory No family hx of      Psychotic Disorder No family hx of      Osteoporosis No family hx of      Obesity No family hx of      Neurologic Disorder No family hx of      Coronary Artery Disease No family hx of      Hyperlipidemia No family hx of      Ovarian Cancer No family hx of      Other Cancer No family hx of      Mental Illness No family hx of      Known Genetic Syndrome No family hx of      Unknown/Adopted No family hx of      Anxiety Disorder No family hx of      Mental Illness No family hx of      Substance Abuse No family hx of      Colon Cancer No family hx of          Current Outpatient Medications   Medication Sig Dispense Refill     aspirin 81 MG tablet Take 1 tablet by mouth daily.  3     atorvastatin (LIPITOR) 80 MG tablet Take 1 tablet (80 mg) by mouth daily 90 tablet 3     ezetimibe (ZETIA) 10 MG tablet Take 1 tablet (10 mg) by mouth daily 90 tablet 3     LANsoprazole (PREVACID) 15 MG DR capsule Take 1 capsule (15 mg) by mouth daily Take 30-60 minutes before a meal. 90 capsule 1     lisinopril (PRINIVIL/ZESTRIL) 10 MG tablet Take 1 tablet (10 mg) by mouth daily 90 tablet 3     metoprolol succinate ER (TOPROL-XL) 25 MG 24 hr tablet Take 0.5 tablets (12.5 mg) by mouth daily 45 tablet 3     sildenafil (VIAGRA) 100 MG tablet Take 0.5-1 tablets ( mg) by mouth daily as needed (ED) Take 30 min to 4 hours before intercourse.  Never use with nitroglycerin, terazosin or doxazosin. 36 tablet 4     No Known Allergies  Recent Labs   Lab Test 08/26/19  0723 06/24/19  0734 11/19/18  0849 11/17/17  0937  11/09/12  0940   LDL 68 94 75 88   < > 71   HDL 53 52 60 51   < > 46   TRIG 101 113 75 158*   < > 72   ALT  --  9 30 29   < > 42   CR  --   --  1.13 1.15   < > 1.20   GFRESTIMATED  --   --  65 64   < > 62   GFRESTBLACK  --   --  78 77   < > 74   POTASSIUM  --   --  4.7 5.0   < > 4.4  "  TSH  --   --   --   --   --  1.39    < > = values in this interval not displayed.      Review of Systems   Constitutional: Negative for chills and fever.   HENT: Negative for congestion, ear pain, hearing loss and sore throat.    Eyes: Negative for pain and visual disturbance.   Respiratory: Negative for cough and shortness of breath.    Cardiovascular: Negative for chest pain, palpitations and peripheral edema.   Gastrointestinal: Negative for abdominal pain, constipation, diarrhea, heartburn, hematochezia and nausea.   Genitourinary: Positive for frequency and impotence. Negative for discharge, dysuria, genital sores, hematuria and urgency.   Musculoskeletal: Positive for myalgias. Negative for arthralgias and joint swelling.   Skin: Negative for rash.   Neurological: Negative for dizziness, weakness, headaches and paresthesias.   Psychiatric/Behavioral: Negative for mood changes. The patient is not nervous/anxious.      This document serves as a record of the services and decisions personally performed and made by Naima Petty CNP. It was created on his/her behalf by Monica Patiño, trained medical scribe. The creation of this document is based the provider's statements to the medical scribes.    Scribguy Patiño 8:10 AM, November 21, 2019  OBJECTIVE:   /62   Pulse 62   Temp 98.2  F (36.8  C) (Oral)   Resp 16   Ht 1.74 m (5' 8.5\")   Wt 92.1 kg (203 lb)   SpO2 95%   BMI 30.41 kg/m   Estimated body mass index is 30.41 kg/m  as calculated from the following:    Height as of this encounter: 1.74 m (5' 8.5\").    Weight as of this encounter: 92.1 kg (203 lb).     Wt Readings from Last 5 Encounters:   11/21/19 92.1 kg (203 lb)   06/24/19 88.9 kg (196 lb)   12/19/18 83.8 kg (184 lb 12.8 oz)   11/19/18 84 kg (185 lb 3.2 oz)   11/17/17 89.1 kg (196 lb 6.4 oz)      Physical Exam  GENERAL: healthy, alert and no distress  EYES: Eyes grossly normal to inspection, PERRL and conjunctivae and sclerae " normal  HENT: ear canals and TM's normal, nose and mouth without ulcers or lesions  NECK: no adenopathy, no asymmetry, masses, or scars and thyroid normal to palpation  RESP: lungs clear to auscultation - no rales, rhonchi or wheezes  CV: regular rate and rhythm, normal S1 S2, no S3 or S4, no murmur, click or rub, no peripheral edema and peripheral pulses strong  ABDOMEN: soft, nontender, no hepatosplenomegaly, no masses and bowel sounds normal   (male): normal male genitalia without lesions or urethral discharge, no hernia  RECTAL: normal sphincter tone, no rectal masses, prostate normal size, smooth, nontender without nodules or masses, external quiet hemorrhoid at 5 o'clock position  MS: no gross musculoskeletal defects noted, no edema  SKIN: no suspicious lesions or rashes  NEURO: Normal strength and tone, mentation intact and speech normal  PSYCH: mentation appears normal, affect normal/bright    Diagnostic Test Results:  Labs reviewed in Epic  No results found for this or any previous visit (from the past 24 hour(s)).    ASSESSMENT / PLAN:       ICD-10-CM    1. Encounter for Medicare annual wellness exam Z00.00 TSH with free T4 reflex     Hemoglobin     Prostate spec antigen screen   2. Coronary artery disease involving native coronary artery of native heart without angina pectoris I25.10 atorvastatin (LIPITOR) 80 MG tablet     Comprehensive metabolic panel     OFFICE/OUTPT VISIT,EST,LEVL IV   3. Hyperlipidemia LDL goal <100 E78.5 atorvastatin (LIPITOR) 80 MG tablet     Comprehensive metabolic panel     Lipid panel reflex to direct LDL Fasting     OFFICE/OUTPT VISIT,EST,LEVL IV   4. CKD (chronic kidney disease) stage 2, GFR 60-89 ml/min N18.2 lisinopril (PRINIVIL/ZESTRIL) 10 MG tablet     Comprehensive metabolic panel     OFFICE/OUTPT VISIT,EST,LEVL IV     Albumin Random Urine Quantitative with Creat Ratio   5. Essential hypertension with goal blood pressure less than 130/80 I10 lisinopril (PRINIVIL/ZESTRIL)  10 MG tablet     metoprolol succinate ER (TOPROL-XL) 25 MG 24 hr tablet     Comprehensive metabolic panel     OFFICE/OUTPT VISIT,EST,LEVL IV   6. Erectile dysfunction, unspecified erectile dysfunction type N52.9 sildenafil (VIAGRA) 100 MG tablet     OFFICE/OUTPT VISIT,EST,LEVL IV   7. Gastroesophageal reflux disease, esophagitis presence not specified K21.9 LANsoprazole (PREVACID) 15 MG DR capsule     OFFICE/OUTPT VISIT,EST,LEVL IV   8. Encounter for screening for malignant neoplasm of prostate  Z12.5 Prostate spec antigen screen     Discussed hyperlipidemia, GERD, erectile dysfunction, hypertension, CKD, CAD, healthy diet, and regular exercise at length with patient today. Physical exam completed today. Updated routine screening lab work; will notify with results.     Blood pressure is well controlled within target range. Medications are well tolerated with no reported side effects. Plan to continue on all other current doses; Refills provided.     Patient if followed by cardiology for his CAD and hyperlipidemia with last office visit on 6/24/19 where he was advised to start Zetia 10 mg daily to his high dose Lipitor and was advised to switch from Lipitor to Crestor if the cost was too high.     Advised that he try to continue off his Prevacid and if he is not able to do so symptom free, that he have an upper endoscopy. Patient will monitor his symptoms off of the Prevacid.     Discussed Shingrex vaccination with patient today and he will follow up with his pharmacy benefit to receive.     Follow up with PCP in 1 year for annual physical exam with routine screening lab work and medication management or prn.     COUNSELING:  Reviewed preventive health counseling, as reflected in patient instructions       Regular exercise       Healthy diet/nutrition       Tetanus- next due in 2026, pneumonia completed, influenza completed this season, patient will complete Shingrex through pharmacy benefit.        Fall risk  "prevention       Safe sex practices/STD prevention       Colon cancer screening- due in 2022       Prostate cancer screening- rectal exam and PSA labs today    Estimated body mass index is 30.41 kg/m  as calculated from the following:    Height as of this encounter: 1.74 m (5' 8.5\").    Weight as of this encounter: 92.1 kg (203 lb).    Weight management plan: Discussed healthy diet and exercise guidelines     reports that he quit smoking about 32 years ago. He has a 15.00 pack-year smoking history. He has never used smokeless tobacco.      Appropriate preventive services were discussed with this patient, including applicable screening as appropriate for cardiovascular disease, diabetes, osteopenia/osteoporosis, and glaucoma.  As appropriate for age/gender, discussed screening for colorectal cancer, prostate cancer, breast cancer, and cervical cancer. Checklist reviewing preventive services available has been given to the patient.    Reviewed patients plan of care and provided an AVS. Intermediate Care Plan ( asthma action plan, low back pain action plan, and migraine action plan) for Kendrick meets the Care Plan requirement. This Care Plan has been established and reviewed with the Patient.    Counseling Resources:  ATP IV Guidelines  Pooled Cohorts Equation Calculator  Breast Cancer Risk Calculator  FRAX Risk Assessment  ICSI Preventive Guidelines  Dietary Guidelines for Americans, 2010  [employer]'s MyPlate  ASA Prophylaxis  Lung CA Screening    The information in this document, created by the medical scribe for me, accurately reflects the services I personally performed and the decisions made by me. I have reviewed and approved this document for accuracy prior to leaving the patient care area.  Naima Petty CNP  8:10 AM, 11/21/19    JOSE Vallejo CNP  [facility]    Identified Health Risks:  "

## 2019-11-21 NOTE — PATIENT INSTRUCTIONS
Patient Education   Personalized Prevention Plan  You are due for the preventive services outlined below.  Your care team is available to assist you in scheduling these services.  If you have already completed any of these items, please share that information with your care team to update in your medical record.  Health Maintenance Due   Topic Date Due     Zoster (Shingles) Vaccine (2 of 3) 12/28/2012     PHQ-2  01/01/2019     Basic Metabolic Panel  11/19/2019     Comprehensive Metabolic Panel  11/19/2019     Kidney Microalbumin Urine Test  11/19/2019     FALL RISK ASSESSMENT  11/19/2019     Annual Wellness Visit  11/19/2019

## 2019-12-11 ENCOUNTER — OFFICE VISIT (OUTPATIENT)
Dept: CARDIOLOGY | Facility: CLINIC | Age: 69
End: 2019-12-11
Payer: MEDICARE

## 2019-12-11 VITALS
SYSTOLIC BLOOD PRESSURE: 116 MMHG | BODY MASS INDEX: 30.56 KG/M2 | HEART RATE: 70 BPM | WEIGHT: 206.3 LBS | HEIGHT: 69 IN | DIASTOLIC BLOOD PRESSURE: 78 MMHG

## 2019-12-11 DIAGNOSIS — I25.10 CORONARY ARTERY DISEASE INVOLVING NATIVE CORONARY ARTERY OF NATIVE HEART WITHOUT ANGINA PECTORIS: ICD-10-CM

## 2019-12-11 DIAGNOSIS — E78.5 HYPERLIPIDEMIA LDL GOAL <70: ICD-10-CM

## 2019-12-11 DIAGNOSIS — I10 ESSENTIAL HYPERTENSION WITH GOAL BLOOD PRESSURE LESS THAN 130/80: ICD-10-CM

## 2019-12-11 PROCEDURE — 99214 OFFICE O/P EST MOD 30 MIN: CPT | Performed by: INTERNAL MEDICINE

## 2019-12-11 ASSESSMENT — MIFFLIN-ST. JEOR: SCORE: 1688.21

## 2019-12-11 NOTE — PROGRESS NOTES
CARDIOLOGY CLINIC CONSULTATION    REASON FOR CONSULT: CAD    PRIMARY CARE PHYSICIAN:  Naima Petty    HISTORY OF PRESENT ILLNESS:  This is a very pleasant 69-year-old male.  The patient has a history of three-vessel bypass surgery in 2001 done at a unknown hospital in [place].  Patient thinks it may be [place] or it may be [facility].  We do not have records of the bypass surgery.  It appears that he has got scars on both his radials and he says both radials were used and he did not have any vein grafts he was from his leg. In 2016 and at that point he had a stress test which showed very mild anterolateral ischemia.  He was completely asymptomatic back then and as such Dr. Joshua then decided to conservatively manage. She has left the practice and he is seeing me as a new patient continuing follow-up with me.  Was last seen by me one year ago.     Patient says he is completely asymptomatic from a cardiovascular standpoint.   He feels functionally very active.  He walked 4 miles this morning and that scan of routine for him.  Denies shortness of breath, palpitations, syncope, presyncope.  Lives a healthy lifestyle and does not smoke. We had added Zetia to his regimen this year on top of lipitor.    PAST MEDICAL HISTORY:  Past Medical History:   Diagnosis Date     CKD (chronic kidney disease) stage 2, GFR 60-89 ml/min 11/12/2013     Coronary artery disease involving native coronary artery of native heart without angina pectoris 12/7/2015     Erectile dysfunction, unspecified erectile dysfunction type 11/30/2015     Essential hypertension with goal blood pressure less than 130/80 11/17/2017     GERD (gastroesophageal reflux disease) 4/5/2012     Hyperlipidemia LDL goal <100 4/5/2012     Hypertension goal BP (blood pressure) < 140/90 4/5/2012       MEDICATIONS:  Current Outpatient Medications   Medication     aspirin 81 MG tablet     atorvastatin (LIPITOR) 80 MG tablet     ezetimibe (ZETIA) 10 MG tablet      lisinopril (PRINIVIL/ZESTRIL) 10 MG tablet     metoprolol succinate ER (TOPROL-XL) 25 MG 24 hr tablet     sildenafil (VIAGRA) 100 MG tablet     LANsoprazole (PREVACID) 15 MG DR capsule     No current facility-administered medications for this visit.        ALLERGIES:  No Known Allergies    SOCIAL HISTORY:  I have reviewed this patient's social history and updated it with pertinent information if needed. Kendrick Castellon  reports that he quit smoking about 32 years ago. He has a 15.00 pack-year smoking history. He has never used smokeless tobacco. He reports that he does not drink alcohol or use drugs.    FAMILY HISTORY:  I have reviewed this patient's family history and updated it with pertinent information if needed.   Family History   Problem Relation Age of Onset     Diabetes Mother      Heart Disease Father 67        MI     Cancer Maternal Grandmother      Asthma No family hx of      C.A.D. No family hx of      Hypertension No family hx of      Cerebrovascular Disease No family hx of      Breast Cancer No family hx of      Cancer - colorectal No family hx of      Prostate Cancer No family hx of      Alcohol/Drug No family hx of      Allergies No family hx of      Alzheimer Disease No family hx of      Anesthesia Reaction No family hx of      Blood Disease No family hx of      Arthritis No family hx of      Cardiovascular No family hx of      Circulatory No family hx of      Congenital Anomalies No family hx of      Connective Tissue Disorder No family hx of      Depression No family hx of      Eye Disorder No family hx of      Gastrointestinal Disease No family hx of      Genitourinary Problems No family hx of      Genetic Disorder No family hx of      Endocrine Disease No family hx of      Gynecology No family hx of      Lipids No family hx of      Musculoskeletal Disorder No family hx of      Hearing Loss No family hx of      Family History Negative No family hx of      Thyroid Disease No family hx of       Respiratory No family hx of      Psychotic Disorder No family hx of      Osteoporosis No family hx of      Obesity No family hx of      Neurologic Disorder No family hx of      Coronary Artery Disease No family hx of      Hyperlipidemia No family hx of      Ovarian Cancer No family hx of      Other Cancer No family hx of      Mental Illness No family hx of      Known Genetic Syndrome No family hx of      Unknown/Adopted No family hx of      Anxiety Disorder No family hx of      Mental Illness No family hx of      Substance Abuse No family hx of      Colon Cancer No family hx of        REVIEW OF SYSTEMS:  Skin:  Negative     Eyes:  Positive for glasses  ENT:  Negative    Respiratory:  Negative    Cardiovascular:  Negative    Gastroenterology: Negative    Genitourinary:  Negative    Musculoskeletal:  Negative    Neurologic:  Negative    Psychiatric:  Negative    Heme/Lymph/Imm:  Negative    Endocrine:  Negative        PHYSICAL EXAM:      BP: 116/78 Pulse: 70            Vital Signs with Ranges  Pulse:  [70] 70  BP: (116)/(78) 116/78  206 lbs 4.8 oz    Constitutional: awake, alert, no distress  Respiratory: CTAB  Cardiovascular: Normal, normal JVP, no edema  GI: nondistended, nontender, bowel sounds present  Musculoskeletal: good muscle tone, strength 5/5 in upper and lower extremities  Neurologic: no focal deficits  Neuropsychiatric: appropriate affact    DATA:  Labs: Pertinent cardiac labs reviewed    Echocardiogram: Normal EF this year    ASSESSMENT:  Stable CAD  CAB  HTN  HLD    RECOMMENDATIONS:  1. No changes. No symptoms doing well.  2. LDL at goal  3. See me back in 1 year.   4. Call with any change in clinical status.    ANASTASIA Back, [facility]  Cardiology - [facility]  December 11, 2019

## 2019-12-11 NOTE — LETTER
12/11/2019    Naima Petty, APRN CNP  [address] [facility] [place] [address]    RE: Kendrick Castellon       Dear Colleague,    I had the pleasure of seeing Kendrick Castellon in the [facility].    CARDIOLOGY CLINIC CONSULTATION    REASON FOR CONSULT: CAD    PRIMARY CARE PHYSICIAN:  Naima Petty    HISTORY OF PRESENT ILLNESS:  This is a very pleasant 69-year-old male.  The patient has a history of three-vessel bypass surgery in 2001 done at a [facility] in [place].  Patient thinks it may be [place] or it may be [facility].  We do not have records of the bypass surgery.  It appears that he has got scars on both his radials and he says both radials were used and he did not have any vein grafts he was from his leg. In 2016 and at that point he had a stress test which showed very mild anterolateral ischemia.  He was completely asymptomatic back then and as such Dr. Joshua then decided to conservatively manage. She has left the practice and he is seeing me as a new patient continuing follow-up with me.   Was last seen by me one year ago.     Patient says he is completely asymptomatic from a cardiovascular standpoint.   He feels functionally very active.  He walked 4 miles this morning and that scan of routine for him.  Denies shortness of breath, palpitations, syncope, presyncope.  Lives a healthy lifestyle and does not smoke. We had added Zetia to his regimen this year on top of lipitor.    PAST MEDICAL HISTORY:  Past Medical History:   Diagnosis Date     CKD (chronic kidney disease) stage 2, GFR 60-89 ml/min 11/12/2013     Coronary artery disease involving native coronary artery of native heart without angina pectoris 12/7/2015     Erectile dysfunction, unspecified erectile dysfunction type 11/30/2015     Essential hypertension with goal blood pressure less than 130/80 11/17/2017     GERD (gastroesophageal reflux disease) 4/5/2012     Hyperlipidemia LDL goal <100 4/5/2012      Hypertension goal BP (blood pressure) < 140/90 4/5/2012       MEDICATIONS:  Current Outpatient Medications   Medication     aspirin 81 MG tablet     atorvastatin (LIPITOR) 80 MG tablet     ezetimibe (ZETIA) 10 MG tablet     lisinopril (PRINIVIL/ZESTRIL) 10 MG tablet     metoprolol succinate ER (TOPROL-XL) 25 MG 24 hr tablet     sildenafil (VIAGRA) 100 MG tablet     LANsoprazole (PREVACID) 15 MG DR capsule     No current facility-administered medications for this visit.        ALLERGIES:  No Known Allergies    SOCIAL HISTORY:  I have reviewed this patient's social history and updated it with pertinent information if needed. Kendrick Castellon  reports that he quit smoking about 32 years ago. He has a 15.00 pack-year smoking history. He has never used smokeless tobacco. He reports that he does not drink alcohol or use drugs.    FAMILY HISTORY:  I have reviewed this patient's family history and updated it with pertinent information if needed.   Family History   Problem Relation Age of Onset     Diabetes Mother      Heart Disease Father 67        MI     Cancer Maternal Grandmother      Asthma No family hx of      C.A.D. No family hx of      Hypertension No family hx of      Cerebrovascular Disease No family hx of      Breast Cancer No family hx of      Cancer - colorectal No family hx of      Prostate Cancer No family hx of      Alcohol/Drug No family hx of      Allergies No family hx of      Alzheimer Disease No family hx of      Anesthesia Reaction No family hx of      Blood Disease No family hx of      Arthritis No family hx of      Cardiovascular No family hx of      Circulatory No family hx of      Congenital Anomalies No family hx of      Connective Tissue Disorder No family hx of      Depression No family hx of      Eye Disorder No family hx of      Gastrointestinal Disease No family hx of      Genitourinary Problems No family hx of      Genetic Disorder No family hx of      Endocrine Disease No family hx of       Gynecology No family hx of      Lipids No family hx of      Musculoskeletal Disorder No family hx of      Hearing Loss No family hx of      Family History Negative No family hx of      Thyroid Disease No family hx of      Respiratory No family hx of      Psychotic Disorder No family hx of      Osteoporosis No family hx of      Obesity No family hx of      Neurologic Disorder No family hx of      Coronary Artery Disease No family hx of      Hyperlipidemia No family hx of      Ovarian Cancer No family hx of      Other Cancer No family hx of      Mental Illness No family hx of      Known Genetic Syndrome No family hx of      Unknown/Adopted No family hx of      Anxiety Disorder No family hx of      Mental Illness No family hx of      Substance Abuse No family hx of      Colon Cancer No family hx of        REVIEW OF SYSTEMS:  Skin:  Negative     Eyes:  Positive for glasses  ENT:  Negative    Respiratory:  Negative    Cardiovascular:  Negative    Gastroenterology: Negative    Genitourinary:  Negative    Musculoskeletal:  Negative    Neurologic:  Negative    Psychiatric:  Negative    Heme/Lymph/Imm:  Negative    Endocrine:  Negative        PHYSICAL EXAM:      BP: 116/78 Pulse: 70            Vital Signs with Ranges  Pulse:  [70] 70  BP: (116)/(78) 116/78  206 lbs 4.8 oz    Constitutional: awake, alert, no distress  Respiratory: CTAB  Cardiovascular: Normal, normal JVP, no edema  GI: nondistended, nontender, bowel sounds present  Musculoskeletal: good muscle tone, strength 5/5 in upper and lower extremities  Neurologic: no focal deficits  Neuropsychiatric: appropriate affact    DATA:  Labs: Pertinent cardiac labs reviewed    Echocardiogram: Normal EF this year    ASSESSMENT:  Stable CAD  CAB  HTN  HLD    RECOMMENDATIONS:  1. No changes. No symptoms doing well.  2. LDL at goal  3. See me back in 1 year.   4. Call with any change in clinical status.    ANASTASIA Back, [facility]  Cardiology - [facility]  December 11,  2019          Thank you for allowing me to participate in the care of your patient.    Sincerely,     Monique Blank MD     [facility]

## 2020-08-17 DIAGNOSIS — E78.5 HYPERLIPIDEMIA LDL GOAL <70: ICD-10-CM

## 2020-08-17 RX ORDER — EZETIMIBE 10 MG/1
10 TABLET ORAL DAILY
Qty: 90 TABLET | Refills: 1 | Status: SHIPPED | OUTPATIENT
Start: 2020-08-17 | End: 2021-01-29

## 2020-12-14 ENCOUNTER — HEALTH MAINTENANCE LETTER (OUTPATIENT)
Age: 70
End: 2020-12-14

## 2021-01-03 DIAGNOSIS — N18.2 CKD (CHRONIC KIDNEY DISEASE) STAGE 2, GFR 60-89 ML/MIN: ICD-10-CM

## 2021-01-03 DIAGNOSIS — I10 ESSENTIAL HYPERTENSION WITH GOAL BLOOD PRESSURE LESS THAN 130/80: ICD-10-CM

## 2021-01-04 RX ORDER — METOPROLOL SUCCINATE 25 MG/1
TABLET, EXTENDED RELEASE ORAL
Qty: 45 TABLET | Refills: 0 | Status: SHIPPED | OUTPATIENT
Start: 2021-01-04 | End: 2021-01-29

## 2021-01-04 RX ORDER — LISINOPRIL 10 MG/1
10 TABLET ORAL DAILY
Qty: 90 TABLET | Refills: 0 | Status: SHIPPED | OUTPATIENT
Start: 2021-01-04 | End: 2021-01-29

## 2021-01-04 NOTE — TELEPHONE ENCOUNTER
Medication is being filled for 1 time refill only due to:  Patient needs to be seen because it has been more than one year since last visit.    Please call patient to schedule an appointment.    Danisha Rome RN on 1/4/2021 at 5:30 PM

## 2021-01-05 ENCOUNTER — MYC MEDICAL ADVICE (OUTPATIENT)
Dept: FAMILY MEDICINE | Facility: CLINIC | Age: 71
End: 2021-01-05

## 2021-01-13 NOTE — TELEPHONE ENCOUNTER
Patient has had a [employer] message and a voicemail left but still hasn't scheduled an appointment, routing back to pcp team to send a message.

## 2021-01-15 ENCOUNTER — HEALTH MAINTENANCE LETTER (OUTPATIENT)
Age: 71
End: 2021-01-15

## 2021-01-23 DIAGNOSIS — E78.5 HYPERLIPIDEMIA LDL GOAL <100: ICD-10-CM

## 2021-01-23 DIAGNOSIS — I25.10 CORONARY ARTERY DISEASE INVOLVING NATIVE CORONARY ARTERY OF NATIVE HEART WITHOUT ANGINA PECTORIS: ICD-10-CM

## 2021-01-25 RX ORDER — ATORVASTATIN CALCIUM 80 MG/1
80 TABLET, FILM COATED ORAL DAILY
Qty: 90 TABLET | Refills: 0 | Status: SHIPPED | OUTPATIENT
Start: 2021-01-25 | End: 2021-01-29

## 2021-01-25 NOTE — TELEPHONE ENCOUNTER
Medication is being filled for 1 time refill only due to:  Patient needs to be seen because it has been more than one year since last visit.    Please call patient to schedule an appointment.    Danisha Rome RN on 1/25/2021 at 9:43 AM

## 2021-01-26 NOTE — PROGRESS NOTES
{2021 PROVIDER CHARTING PREFERENCE:303905}    Subjective     Kendrick Castellon is a 70 year old male who presents to clinic today for the following health issues accompanied by his {accompanied to visit:072721}:    HPI     {SUPERLIST (Optional):626071}  {additonal problems for provider to add (Optional):019757}    Review of Systems   {ROS COMP (Optional):409559}      Objective    There were no vitals taken for this visit.  There is no height or weight on file to calculate BMI.  Physical Exam   {Exam List (Optional):367391}    {Diagnostic Test Results (Optional):156462}

## 2021-01-29 ENCOUNTER — OFFICE VISIT (OUTPATIENT)
Dept: FAMILY MEDICINE | Facility: CLINIC | Age: 71
End: 2021-01-29
Payer: MEDICARE

## 2021-01-29 ENCOUNTER — TELEPHONE (OUTPATIENT)
Dept: FAMILY MEDICINE | Facility: CLINIC | Age: 71
End: 2021-01-29

## 2021-01-29 VITALS
OXYGEN SATURATION: 97 % | DIASTOLIC BLOOD PRESSURE: 56 MMHG | RESPIRATION RATE: 16 BRPM | WEIGHT: 223.6 LBS | TEMPERATURE: 97.3 F | SYSTOLIC BLOOD PRESSURE: 108 MMHG | HEART RATE: 71 BPM | BODY MASS INDEX: 33.12 KG/M2 | HEIGHT: 69 IN

## 2021-01-29 DIAGNOSIS — E78.5 HYPERLIPIDEMIA LDL GOAL <100: ICD-10-CM

## 2021-01-29 DIAGNOSIS — Z00.00 ENCOUNTER FOR MEDICARE ANNUAL WELLNESS EXAM: Primary | ICD-10-CM

## 2021-01-29 DIAGNOSIS — Z12.5 ENCOUNTER FOR SCREENING FOR MALIGNANT NEOPLASM OF PROSTATE: ICD-10-CM

## 2021-01-29 DIAGNOSIS — Z13.220 SCREENING FOR HYPERLIPIDEMIA: ICD-10-CM

## 2021-01-29 DIAGNOSIS — K21.9 GASTROESOPHAGEAL REFLUX DISEASE WITHOUT ESOPHAGITIS: ICD-10-CM

## 2021-01-29 DIAGNOSIS — I10 ESSENTIAL HYPERTENSION WITH GOAL BLOOD PRESSURE LESS THAN 130/80: ICD-10-CM

## 2021-01-29 DIAGNOSIS — Z71.89 ADVANCED DIRECTIVES, COUNSELING/DISCUSSION: ICD-10-CM

## 2021-01-29 DIAGNOSIS — N52.9 ERECTILE DYSFUNCTION, UNSPECIFIED ERECTILE DYSFUNCTION TYPE: ICD-10-CM

## 2021-01-29 DIAGNOSIS — I25.10 CORONARY ARTERY DISEASE INVOLVING NATIVE CORONARY ARTERY OF NATIVE HEART WITHOUT ANGINA PECTORIS: ICD-10-CM

## 2021-01-29 DIAGNOSIS — N18.2 CKD (CHRONIC KIDNEY DISEASE) STAGE 2, GFR 60-89 ML/MIN: Primary | ICD-10-CM

## 2021-01-29 DIAGNOSIS — E78.5 HYPERLIPIDEMIA LDL GOAL <70: ICD-10-CM

## 2021-01-29 DIAGNOSIS — N18.2 CKD (CHRONIC KIDNEY DISEASE) STAGE 2, GFR 60-89 ML/MIN: ICD-10-CM

## 2021-01-29 LAB
ALBUMIN SERPL-MCNC: 3.9 G/DL (ref 3.4–5)
ALP SERPL-CCNC: 63 U/L (ref 40–150)
ALT SERPL W P-5'-P-CCNC: 37 U/L (ref 0–70)
ANION GAP SERPL CALCULATED.3IONS-SCNC: 4 MMOL/L (ref 3–14)
AST SERPL W P-5'-P-CCNC: 19 U/L (ref 0–45)
BILIRUB SERPL-MCNC: 0.5 MG/DL (ref 0.2–1.3)
BUN SERPL-MCNC: 16 MG/DL (ref 7–30)
CALCIUM SERPL-MCNC: 9.1 MG/DL (ref 8.5–10.1)
CHLORIDE SERPL-SCNC: 107 MMOL/L (ref 94–109)
CHOLEST SERPL-MCNC: 157 MG/DL
CO2 SERPL-SCNC: 27 MMOL/L (ref 20–32)
CREAT SERPL-MCNC: 1.05 MG/DL (ref 0.66–1.25)
CREAT UR-MCNC: 165 MG/DL
GFR SERPL CREATININE-BSD FRML MDRD: 72 ML/MIN/{1.73_M2}
GLUCOSE SERPL-MCNC: 95 MG/DL (ref 70–99)
HDLC SERPL-MCNC: 56 MG/DL
HGB BLD-MCNC: 15.2 G/DL (ref 13.3–17.7)
LDLC SERPL CALC-MCNC: 83 MG/DL
MICROALBUMIN UR-MCNC: 91 MG/L
MICROALBUMIN/CREAT UR: 55.09 MG/G CR (ref 0–17)
NONHDLC SERPL-MCNC: 101 MG/DL
POTASSIUM SERPL-SCNC: 4.6 MMOL/L (ref 3.4–5.3)
PROT SERPL-MCNC: 8.2 G/DL (ref 6.8–8.8)
PSA SERPL-ACNC: 2.8 UG/L (ref 0–4)
SODIUM SERPL-SCNC: 138 MMOL/L (ref 133–144)
TRIGL SERPL-MCNC: 90 MG/DL

## 2021-01-29 PROCEDURE — 82043 UR ALBUMIN QUANTITATIVE: CPT | Performed by: NURSE PRACTITIONER

## 2021-01-29 PROCEDURE — G0103 PSA SCREENING: HCPCS | Performed by: NURSE PRACTITIONER

## 2021-01-29 PROCEDURE — G0439 PPPS, SUBSEQ VISIT: HCPCS | Performed by: NURSE PRACTITIONER

## 2021-01-29 PROCEDURE — 85018 HEMOGLOBIN: CPT | Performed by: NURSE PRACTITIONER

## 2021-01-29 PROCEDURE — 80053 COMPREHEN METABOLIC PANEL: CPT | Performed by: NURSE PRACTITIONER

## 2021-01-29 PROCEDURE — 80061 LIPID PANEL: CPT | Performed by: NURSE PRACTITIONER

## 2021-01-29 PROCEDURE — 90750 HZV VACC RECOMBINANT IM: CPT | Performed by: NURSE PRACTITIONER

## 2021-01-29 PROCEDURE — 36415 COLL VENOUS BLD VENIPUNCTURE: CPT | Performed by: NURSE PRACTITIONER

## 2021-01-29 PROCEDURE — 99214 OFFICE O/P EST MOD 30 MIN: CPT | Mod: 25 | Performed by: NURSE PRACTITIONER

## 2021-01-29 PROCEDURE — 90471 IMMUNIZATION ADMIN: CPT | Performed by: NURSE PRACTITIONER

## 2021-01-29 RX ORDER — ATORVASTATIN CALCIUM 80 MG/1
80 TABLET, FILM COATED ORAL DAILY
Qty: 90 TABLET | Refills: 3 | Status: SHIPPED | OUTPATIENT
Start: 2021-01-29 | End: 2022-02-03

## 2021-01-29 RX ORDER — LISINOPRIL 20 MG/1
20 TABLET ORAL DAILY
Qty: 90 TABLET | Refills: 3 | Status: SHIPPED | OUTPATIENT
Start: 2021-01-29 | End: 2021-05-17

## 2021-01-29 RX ORDER — EZETIMIBE 10 MG/1
10 TABLET ORAL DAILY
Qty: 90 TABLET | Refills: 3 | Status: SHIPPED | OUTPATIENT
Start: 2021-01-29 | End: 2022-03-30

## 2021-01-29 RX ORDER — LISINOPRIL 10 MG/1
10 TABLET ORAL DAILY
Qty: 90 TABLET | Refills: 3 | Status: SHIPPED | OUTPATIENT
Start: 2021-01-29 | End: 2021-05-17 | Stop reason: DRUGHIGH

## 2021-01-29 RX ORDER — MECOBALAMIN 5000 MCG
15 TABLET,DISINTEGRATING ORAL DAILY
Qty: 90 CAPSULE | Refills: 1 | Status: SHIPPED | OUTPATIENT
Start: 2021-01-29 | End: 2022-03-30

## 2021-01-29 RX ORDER — SILDENAFIL 100 MG/1
50-100 TABLET, FILM COATED ORAL DAILY PRN
Qty: 36 TABLET | Refills: 0 | Status: SHIPPED | OUTPATIENT
Start: 2021-01-29 | End: 2021-09-15

## 2021-01-29 RX ORDER — METOPROLOL SUCCINATE 25 MG/1
TABLET, EXTENDED RELEASE ORAL
Qty: 45 TABLET | Refills: 3 | Status: SHIPPED | OUTPATIENT
Start: 2021-01-29 | End: 2022-02-03

## 2021-01-29 ASSESSMENT — MIFFLIN-ST. JEOR: SCORE: 1761.68

## 2021-01-29 ASSESSMENT — ACTIVITIES OF DAILY LIVING (ADL): CURRENT_FUNCTION: NO ASSISTANCE NEEDED

## 2021-01-29 NOTE — RESULT ENCOUNTER NOTE
Kendrick,  Your labs that have returned are normal. More labs are still processing. Naima Petty, DNP

## 2021-01-29 NOTE — PATIENT INSTRUCTIONS
Patient Education   Personalized Prevention Plan  You are due for the preventive services outlined below.  Your care team is available to assist you in scheduling these services.  If you have already completed any of these items, please share that information with your care team to update in your medical record.  Health Maintenance Due   Topic Date Due     Zoster (Shingles) Vaccine (2 of 3) 12/28/2012     Basic Metabolic Panel  11/21/2020     Comprehensive Metabolic Panel  11/21/2020     Cholesterol Lab  11/21/2020     Kidney Microalbumin Urine Test  11/21/2020     FALL RISK ASSESSMENT  11/21/2020

## 2021-01-29 NOTE — TELEPHONE ENCOUNTER
Patient notified of labs.   Increasing lisinopril for higher microalbumin level.       Labs and BP check in 5 weeks- staff please schedule for mid March and pt. [facility]   JOSE Vallejo CNP

## 2021-01-29 NOTE — PROGRESS NOTES
SUBJECTIVE:   Kendrick Castellon is a 70 year old male who presents for Preventive Visit.      Patient has been advised of split billing requirements and indicates understanding: Yes   Are you in the first 12 months of your Medicare coverage?  No    History of Present Illness       He eats 0-1 servings of fruits and vegetables daily.He consumes 0 sweetened beverage(s) daily.He exercises with enough effort to increase his heart rate 20 to 29 minutes per day.  He exercises with enough effort to increase his heart rate 3 or less days per week.   He is taking medications regularly.  Healthy Habits:     In general, how would you rate your overall health?  Good    Frequency of exercise:  2-3 days/week    Duration of exercise:  15-30 minutes    Taking medications regularly:  0    Medication side effects:: stifness in legs     Ability to successfully perform activities of daily living:  No assistance needed    Home Safety:  No safety concerns identified    Hearing Impairment:  No hearing concerns    In the past 6 months, have you been bothered by leaking of urine?  No    In general, how would you rate your overall mental or emotional health?  Excellent      PHQ-2 Total Score: 0    Do you feel safe in your environment? Yes    Have you ever done Advance Care Planning? (For example, a Health Directive, POLST, or a discussion with a medical provider or your loved ones about your wishes): No, advance care planning information given to patient to review.  Patient plans to discuss their wishes with loved ones or provider.         Fall risk  Fallen 2 or more times in the past year?: No  Any fall with injury in the past year?: No    Cognitive Screening   1) Repeat 3 items (Leader, Season, Table)    2) Clock draw: NORMAL  3) 3 item recall: Recalls 3 objects  Results: 3 items recalled: COGNITIVE IMPAIRMENT LESS LIKELY    Patient is retiring as a  for nonprofit organization.  He plans to retire this  summer.    Hypertension/CKD.  Patient is being treated with lisinopril currently.  Blood pressure is normotensive.  The denies chest pain or shortness of breath.    History of coronary artery disease and ED.  Patient has had no issues with exercise tolerance.  He notices his weight has increased during Covid.  He is working towards making goals to increase exercise and decrease his weight.    Reflux-patient noticed increase in symptoms with his weight gain.  He is motivated for weight loss he is taking a PPI with good results.  He is due for renal testing.          Mini-CogTM Copyright [employer]. Licensed by the author for use in [facility]; reprinted with permission (ede@[facility]). All rights reserved.      Do you have sleep apnea, excessive snoring or daytime drowsiness?: no    Reviewed and updated as needed this visit by clinical staff  Tobacco  Allergies  Meds   Med Hx  Surg Hx  Fam Hx  Soc Hx        Reviewed and updated as needed this visit by Provider                Social History     Tobacco Use     Smoking status: Former Smoker     Packs/day: 1.00     Years: 15.00     Pack years: 15.00     Quit date: 1987     Years since quittin.8     Smokeless tobacco: Never Used   Substance Use Topics     Alcohol use: Yes     Alcohol/week: 11.7 standard drinks     Types: 14 Standard drinks or equivalent per week     Comment: 0-2 perweek      If you drink alcohol do you typically have >3 drinks per day or >7 drinks per week? No    Alcohol Use 2021   Prescreen: >3 drinks/day or >7 drinks/week? -   Prescreen: >3 drinks/day or >7 drinks/week? No               Current providers sharing in care for this patient include:   Patient Care Team:  Naima Petty APRN CNP as PCP - General (Family Practice)  Naima Petty APRN CNP as Assigned PCP    The following health maintenance items are reviewed in Epic and correct as of today:  Health Maintenance   Topic Date Due     ZOSTER IMMUNIZATION (2 of 3)  "12/28/2012     BMP  11/21/2020     CMP  11/21/2020     LIPID  11/21/2020     MICROALBUMIN  11/21/2020     FALL RISK ASSESSMENT  11/21/2020     MEDICARE ANNUAL WELLNESS VISIT  01/29/2022     COLORECTAL CANCER SCREENING  05/10/2022     ADVANCE CARE PLANNING  11/19/2023     DTAP/TDAP/TD IMMUNIZATION (3 - Td) 04/28/2026     PHQ-2  Completed     INFLUENZA VACCINE  Completed     Pneumococcal Vaccine: 65+ Years  Completed     AORTIC ANEURYSM SCREENING (SYSTEM ASSIGNED)  Completed     HEPATITIS C SCREENING  Addressed     Pneumococcal Vaccine: Pediatrics (0 to 5 Years) and At-Risk Patients (6 to 64 Years)  Aged Out     IPV IMMUNIZATION  Aged Out     MENINGITIS IMMUNIZATION  Aged Out     HEPATITIS B IMMUNIZATION  Aged Out     Lab work is in process  Colonoscopy done 2012.  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 Hard time walking as dog is aging.     Has daughter living with her.       Review of Systems  Constitutional, HEENT, cardiovascular, pulmonary, GI, , musculoskeletal, neuro, skin, endocrine and psych systems are negative, except as otherwise noted.    OBJECTIVE:   /56   Pulse 71   Temp 97.3  F (36.3  C) (Temporal)   Resp 16   Ht 1.74 m (5' 8.5\")   Wt 101.4 kg (223 lb 9.6 oz)   SpO2 97%   BMI 33.50 kg/m   Estimated body mass index is 33.5 kg/m  as calculated from the following:    Height as of this encounter: 1.74 m (5' 8.5\").    Weight as of this encounter: 101.4 kg (223 lb 9.6 oz).  Physical Exam  GENERAL: healthy, alert and no distress  EYES: Eyes grossly normal to inspection, PERRL and conjunctivae and sclerae normal  HENT: ear canals and TM's normal, nose and mouth without ulcers or lesions  NECK: no adenopathy, no asymmetry, masses, or scars and thyroid normal to palpation  RESP: lungs clear to auscultation - no rales, rhonchi or wheezes  CV: regular rate and rhythm, normal S1 S2, no S3 or S4, no murmur, click or rub, no peripheral edema and peripheral pulses " strong  ABDOMEN: soft, nontender, no hepatosplenomegaly, no masses and bowel sounds normal   (male): no hernias and penis normal without urethral discharge  MS: no gross musculoskeletal defects noted, no edema  SKIN: no suspicious lesions or rashes  NEURO: Normal strength and tone, mentation intact and speech normal  PSYCH: mentation appears normal, affect normal/bright    Diagnostic Test Results:  Labs reviewed in Epic  Results for orders placed or performed in visit on 01/29/21 (from the past 24 hour(s))   COMPREHENSIVE METABOLIC PANEL   Result Value Ref Range    Sodium 138 133 - 144 mmol/L    Potassium 4.6 3.4 - 5.3 mmol/L    Chloride 107 94 - 109 mmol/L    Carbon Dioxide 27 20 - 32 mmol/L    Anion Gap 4 3 - 14 mmol/L    Glucose 95 70 - 99 mg/dL    Urea Nitrogen 16 7 - 30 mg/dL    Creatinine 1.05 0.66 - 1.25 mg/dL    GFR Estimate 72 >60 mL/min/[1.73_m2]    GFR Estimate If Black 83 >60 mL/min/[1.73_m2]    Calcium 9.1 8.5 - 10.1 mg/dL    Bilirubin Total 0.5 0.2 - 1.3 mg/dL    Albumin 3.9 3.4 - 5.0 g/dL    Protein Total 8.2 6.8 - 8.8 g/dL    Alkaline Phosphatase 63 40 - 150 U/L    ALT 37 0 - 70 U/L    AST 19 0 - 45 U/L   Lipid panel reflex to direct LDL Fasting   Result Value Ref Range    Cholesterol 157 <200 mg/dL    Triglycerides 90 <150 mg/dL    HDL Cholesterol 56 >39 mg/dL    LDL Cholesterol Calculated 83 <100 mg/dL    Non HDL Cholesterol 101 <130 mg/dL   PSA, screen   Result Value Ref Range    PSA 2.80 0 - 4 ug/L   Hemoglobin   Result Value Ref Range    Hemoglobin 15.2 13.3 - 17.7 g/dL   Albumin Random Urine Quantitative with Creat Ratio   Result Value Ref Range    Creatinine Urine 165 mg/dL    Albumin Urine mg/L 91 mg/L    Albumin Urine mg/g Cr 55.09 (H) 0 - 17 mg/g Cr       ASSESSMENT / PLAN:       ICD-10-CM    1. Encounter for Medicare annual wellness exam  Z00.00 PSA, screen     Hemoglobin   2. Screening for hyperlipidemia  Z13.220    3. Gastroesophageal reflux disease without esophagitis  K21.9  "LANsoprazole (PREVACID) 15 MG DR capsule   4. Hyperlipidemia LDL goal <100  E78.5 atorvastatin (LIPITOR) 80 MG tablet   5. Coronary artery disease involving native coronary artery of native heart without angina pectoris  I25.10 atorvastatin (LIPITOR) 80 MG tablet   6. Essential hypertension with goal blood pressure less than 130/80  I10 lisinopril (ZESTRIL) 10 MG tablet     metoprolol succinate ER (TOPROL-XL) 25 MG 24 hr tablet   7. CKD (chronic kidney disease) stage 2, GFR 60-89 ml/min  N18.2 COMPREHENSIVE METABOLIC PANEL     Albumin Random Urine Quantitative with Creat Ratio     lisinopril (ZESTRIL) 10 MG tablet   8. Hyperlipidemia LDL goal <70  E78.5 Lipid panel reflex to direct LDL Fasting     ezetimibe (ZETIA) 10 MG tablet   9. Erectile dysfunction, unspecified erectile dysfunction type  N52.9 sildenafil (VIAGRA) 100 MG tablet   10. Encounter for screening for malignant neoplasm of prostate   Z12.5 PSA, screen   11. Advanced directives, counseling/discussion  Z71.89        Patient has been advised of split billing requirements and indicates understanding: Yes  COUNSELING:  Reviewed preventive health counseling, as reflected in patient instructions       Regular exercise       Healthy diet/nutrition       Vision screening       Fall risk prevention       Immunizations    Vaccinated for: Zoster             Colon cancer screening       Prostate cancer screening       CAD/lipid/weight counseling.     Updating labs today.  Medications refilled.    Result note showing increase in microalbuminuria.  Increasing lisinopril and rechecking labs in 5 weeks.  Discussed weight loss in relation to chronic disease conditions.    Colonoscopy is up-to-date this year.    Advance directive information given to patient and his wife for discussion.      Estimated body mass index is 33.5 kg/m  as calculated from the following:    Height as of this encounter: 1.74 m (5' 8.5\").    Weight as of this encounter: 101.4 kg (223 lb 9.6 " oz).    Weight management plan: Discussed healthy diet and exercise guidelines    He reports that he quit smoking about 33 years ago. He has a 15.00 pack-year smoking history. He has never used smokeless tobacco.      Appropriate preventive services were discussed with this patient, including applicable screening as appropriate for cardiovascular disease, diabetes, osteopenia/osteoporosis, and glaucoma.  As appropriate for age/gender, discussed screening for colorectal cancer, prostate cancer, breast cancer, and cervical cancer. Checklist reviewing preventive services available has been given to the patient.    Reviewed patients plan of care and provided an AVS. The Intermediate Care Plan ( asthma action plan, low back pain action plan, and migraine action plan) for Kendrick meets the Care Plan requirement. This Care Plan has been established and reviewed with the Patient.    Counseling Resources:  ATP IV Guidelines  Pooled Cohorts Equation Calculator  Breast Cancer Risk Calculator  Breast Cancer: Medication to Reduce Risk  FRAX Risk Assessment  ICSI Preventive Guidelines  Dietary Guidelines for Americans, 2010  USDA's MyPlate  ASA Prophylaxis  Lung CA Screening    JOSE Vallejo [facility]  [employer]    Identified Health Risks:

## 2021-01-29 NOTE — TELEPHONE ENCOUNTER
Patient scheduled.     Next 5 appointments (look out 90 days)    Mar 03, 2021  9:30 AM  Nurse Only with  [address] ([facility]) [address] [facility], [address]  [facility] [address]  [phone]        Tank Macias MA on 1/29/2021 at 3:22 PM

## 2021-01-29 NOTE — NURSING NOTE
Prior to immunization administration, verified patients identity using patient s name and date of birth. Please see Immunization Activity for additional information.     Screening Questionnaire for Adult Immunization    Are you sick today?   No   Do you have allergies to medications, food, a vaccine component or latex?   No   Have you ever had a serious reaction after receiving a vaccination?   No   Do you have a long-term health problem with heart, lung, kidney, or metabolic disease (e.g., diabetes), asthma, a blood disorder, no spleen, complement component deficiency, a cochlear implant, or a spinal fluid leak?  Are you on long-term aspirin therapy?   Yes   Do you have cancer, leukemia, HIV/AIDS, or any other immune system problem?   No   Do you have a parent, brother, or sister with an immune system problem?   No   In the past 3 months, have you taken medications that affect  your immune system, such as prednisone, other steroids, or anticancer drugs; drugs for the treatment of rheumatoid arthritis, Crohn s disease, or psoriasis; or have you had radiation treatments?   No   Have you had a seizure, or a brain or other nervous system problem?   No   During the past year, have you received a transfusion of blood or blood    products, or been given immune (gamma) globulin or antiviral drug?   No   For women: Are you pregnant or is there a chance you could become       pregnant during the next month?   No   Have you received any vaccinations in the past 4 weeks?   No     Immunization questionnaire was positive for at least one answer.  Notified provider.        Per orders of Dr. Naima Petty, injection of shingles given by Lindsey Sethi CMA. Patient instructed to remain in clinic for 15 minutes afterwards, and to report any adverse reaction to me immediately.       Screening performed by Lindsey Sethi CMA on 1/29/2021 at 9:15 AM.

## 2021-03-05 ENCOUNTER — ALLIED HEALTH/NURSE VISIT (OUTPATIENT)
Dept: FAMILY MEDICINE | Facility: CLINIC | Age: 71
End: 2021-03-05
Payer: MEDICARE

## 2021-03-05 VITALS — SYSTOLIC BLOOD PRESSURE: 118 MMHG | HEART RATE: 76 BPM | DIASTOLIC BLOOD PRESSURE: 74 MMHG

## 2021-03-05 DIAGNOSIS — I10 ESSENTIAL HYPERTENSION: Primary | ICD-10-CM

## 2021-03-05 DIAGNOSIS — N18.2 CKD (CHRONIC KIDNEY DISEASE) STAGE 2, GFR 60-89 ML/MIN: ICD-10-CM

## 2021-03-05 LAB
ANION GAP SERPL CALCULATED.3IONS-SCNC: 6 MMOL/L (ref 3–14)
BUN SERPL-MCNC: 17 MG/DL (ref 7–30)
CALCIUM SERPL-MCNC: 9.2 MG/DL (ref 8.5–10.1)
CHLORIDE SERPL-SCNC: 106 MMOL/L (ref 94–109)
CO2 SERPL-SCNC: 26 MMOL/L (ref 20–32)
CREAT SERPL-MCNC: 1.17 MG/DL (ref 0.66–1.25)
CREAT UR-MCNC: 34 MG/DL
GFR SERPL CREATININE-BSD FRML MDRD: 63 ML/MIN/{1.73_M2}
GLUCOSE SERPL-MCNC: 90 MG/DL (ref 70–99)
MICROALBUMIN UR-MCNC: 10 MG/L
MICROALBUMIN/CREAT UR: 29.37 MG/G CR (ref 0–17)
POTASSIUM SERPL-SCNC: 4.7 MMOL/L (ref 3.4–5.3)
SODIUM SERPL-SCNC: 138 MMOL/L (ref 133–144)

## 2021-03-05 PROCEDURE — 80048 BASIC METABOLIC PNL TOTAL CA: CPT | Performed by: NURSE PRACTITIONER

## 2021-03-05 PROCEDURE — 36415 COLL VENOUS BLD VENIPUNCTURE: CPT | Performed by: NURSE PRACTITIONER

## 2021-03-05 PROCEDURE — 99207 PR NO CHARGE NURSE ONLY: CPT

## 2021-03-05 PROCEDURE — 82043 UR ALBUMIN QUANTITATIVE: CPT | Performed by: NURSE PRACTITIONER

## 2021-05-17 ENCOUNTER — TELEPHONE (OUTPATIENT)
Dept: FAMILY MEDICINE | Facility: CLINIC | Age: 71
End: 2021-05-17

## 2021-05-17 DIAGNOSIS — N18.2 CKD (CHRONIC KIDNEY DISEASE) STAGE 2, GFR 60-89 ML/MIN: ICD-10-CM

## 2021-05-17 DIAGNOSIS — I10 ESSENTIAL HYPERTENSION WITH GOAL BLOOD PRESSURE LESS THAN 130/80: ICD-10-CM

## 2021-05-17 RX ORDER — LISINOPRIL 20 MG/1
20 TABLET ORAL DAILY
Qty: 90 TABLET | Refills: 3 | Status: SHIPPED | OUTPATIENT
Start: 2021-05-17 | End: 2022-03-30

## 2021-05-17 NOTE — TELEPHONE ENCOUNTER
Cub needs you to call them and tell them if he is on both lisinopril 10 and 20?  Also what is the directions because Kendrick says he is only taking 10 mg twice a day.

## 2021-09-15 DIAGNOSIS — N52.9 ERECTILE DYSFUNCTION, UNSPECIFIED ERECTILE DYSFUNCTION TYPE: ICD-10-CM

## 2021-09-15 RX ORDER — SILDENAFIL 100 MG/1
TABLET, FILM COATED ORAL
Qty: 36 TABLET | Refills: 1 | Status: SHIPPED | OUTPATIENT
Start: 2021-09-15 | End: 2022-03-30

## 2021-09-15 NOTE — TELEPHONE ENCOUNTER
Prescription approved per [facility] Refill Protocol.    Danisha Rome RN on 9/15/2021 at 5:17 PM

## 2021-10-02 ENCOUNTER — HEALTH MAINTENANCE LETTER (OUTPATIENT)
Age: 71
End: 2021-10-02

## 2022-02-03 DIAGNOSIS — I10 ESSENTIAL HYPERTENSION WITH GOAL BLOOD PRESSURE LESS THAN 130/80: ICD-10-CM

## 2022-02-03 DIAGNOSIS — E78.5 HYPERLIPIDEMIA LDL GOAL <100: ICD-10-CM

## 2022-02-03 DIAGNOSIS — I25.10 CORONARY ARTERY DISEASE INVOLVING NATIVE CORONARY ARTERY OF NATIVE HEART WITHOUT ANGINA PECTORIS: ICD-10-CM

## 2022-02-03 RX ORDER — METOPROLOL SUCCINATE 25 MG/1
TABLET, EXTENDED RELEASE ORAL
Qty: 45 TABLET | Refills: 0 | Status: SHIPPED | OUTPATIENT
Start: 2022-02-03 | End: 2022-03-30

## 2022-02-03 RX ORDER — ATORVASTATIN CALCIUM 80 MG/1
80 TABLET, FILM COATED ORAL DAILY
Qty: 90 TABLET | Refills: 0 | Status: SHIPPED | OUTPATIENT
Start: 2022-02-03 | End: 2022-03-30

## 2022-02-03 NOTE — TELEPHONE ENCOUNTER
Pending Prescriptions:                       Disp   Refills    atorvastatin (LIPITOR) 80 MG tablet [Phar*90 tab*0            Sig: Take 1 tablet (80 mg) by mouth daily    metoprolol succinate ER (TOPROL-XL) 25 MG*45 tab*0            Sig: Take 1/2 tablet (12.5 mg) by mouth daily    Medication is being filled for 1 time ten refill only due to:  Patient is due for physical and fasting cholesterol.     Please call and help schedule.  Thank you!    DMITRI TorresN, RN, PHN  [facility]  February 3, 2022

## 2022-02-16 NOTE — PROGRESS NOTES
"hgba  SUBJECTIVE:   Kendrick Castellon is a 71 year old male who presents for Preventive Visit.        Patient has been advised of split billing requirements and indicates understanding: Yes     Are you in the first 12 months of your Medicare coverage?  No    Healthy Habits:     In general, how would you rate your overall health?  Good    Frequency of exercise:  6-7 days/week    Duration of exercise:  Greater than 60 minutes    Do you usually eat at least 4 servings of fruit and vegetables a day, include whole grains    & fiber and avoid regularly eating high fat or \"junk\" foods?  No    Taking medications regularly:  Yes    Medication side effects:  None    Ability to successfully perform activities of daily living:  No assistance needed    Home Safety:  Throw rugs in the hallway and lack of grab bars in the bathroom    Hearing Impairment:  No hearing concerns    In the past 6 months, have you been bothered by leaking of urine?  No    In general, how would you rate your overall mental or emotional health?  Good      PHQ-2 Total Score: 0    Additional concerns today:  No    Do you feel safe in your environment? Yes    Have you ever done Advance Care Planning? (For example, a Health Directive, POLST, or a discussion with a medical provider or your loved ones about your wishes): No, advance care planning information given to patient to review.  Patient plans to discuss their wishes with loved ones or provider.       CAD, s/p CABG with vein harvesting in arms. Having calf pain, seemed to get better as he exercised/lost weigh this past year. But after a few miles of walking will get calf pain that can be severe.     CAD/HTN- on statin, tolerating well. No CP/SOBDue for routine cardiology follow-up.    GERD_ stable on PPI.     Using Viagra with good benefits.       Fall risk  Fallen 2 or more times in the past year?: (P) No  Any fall with injury in the past year?: (P) No    Cognitive Screening   1) Repeat 3 items (Leader, " Season, Table)    2) Clock draw: NORMAL  3) 3 item recall: Recalls 3 objects  Results: 3 items recalled: COGNITIVE IMPAIRMENT LESS LIKELY    Mini-CogTM Copyright S Delio. Licensed by the author for use in [facility]; reprinted with permission (ede@[facility]). All rights reserved.      Do you have sleep apnea, excessive snoring or daytime drowsiness?: no    Reviewed and updated as needed this visit by clinical staff   Tobacco  Allergies  Meds   Med Hx  Surg Hx  Fam Hx  Soc Hx        Reviewed and updated as needed this visit by Provider                 Social History     Tobacco Use     Smoking status: Former Smoker     Packs/day: 1.00     Years: 15.00     Pack years: 15.00     Quit date: 1987     Years since quittin.0     Smokeless tobacco: Never Used   Substance Use Topics     Alcohol use: Yes     Alcohol/week: 11.7 standard drinks     Types: 14 Standard drinks or equivalent per week     Comment: 0-2 perweek          Alcohol Use 3/30/2022   Prescreen: >3 drinks/day or >7 drinks/week? No   Prescreen: >3 drinks/day or >7 drinks/week? -             Current providers sharing in care for this patient include:   Patient Care Team:  Naima Petty APRN CNP as PCP - General (Family Practice)  Naima Petty APRN CNP as Assigned PCP    The following health maintenance items are reviewed in Epic and correct as of today:  Health Maintenance Due   Topic Date Due     ANNUAL REVIEW OF  ORDERS  Never done     LUNG CANCER SCREENING  Never done     ZOSTER IMMUNIZATION (3 of 3) 2021     CMP  2022     LIPID  2022     FALL RISK ASSESSMENT  2022     BMP  2022     MICROALBUMIN  2022     Lab work is in process          Review of Systems   Constitutional: Negative for chills and fever.   HENT: Negative for congestion, ear pain, hearing loss and sore throat.    Eyes: Positive for visual disturbance. Negative for pain.   Respiratory: Negative for cough and shortness of breath.  "   Cardiovascular: Negative for chest pain, palpitations and peripheral edema.   Gastrointestinal: Negative for abdominal pain, constipation, diarrhea, heartburn, hematochezia and nausea.   Genitourinary: Positive for frequency. Negative for dysuria, genital sores, hematuria, impotence and urgency.   Musculoskeletal: Negative for arthralgias, joint swelling and myalgias.   Skin: Negative for rash.   Neurological: Negative for dizziness, weakness, headaches and paresthesias.   Psychiatric/Behavioral: Negative for mood changes. The patient is not nervous/anxious.    no nocturia  Looking into cataract surgery      OBJECTIVE:   /70 (BP Location: Left arm, Patient Position: Chair, Cuff Size: Adult Large)   Pulse 61   Temp 97.7  F (36.5  C) (Temporal)   Resp 18   Ht 1.74 m (5' 8.5\")   Wt 93.9 kg (207 lb)   SpO2 97%   BMI 31.02 kg/m   Estimated body mass index is 31.02 kg/m  as calculated from the following:    Height as of this encounter: 1.74 m (5' 8.5\").    Weight as of this encounter: 93.9 kg (207 lb).  Physical Exam  GENERAL: healthy, alert and no distress  EYES: Eyes grossly normal to inspection, PERRL and conjunctivae and sclerae normal  HENT: ear canals and TM's normal, nose and mouth without ulcers or lesions  NECK: no adenopathy, no asymmetry, masses, or scars and thyroid normal to palpation  RESP: lungs clear to auscultation - no rales, rhonchi or wheezes  CV: regular rate and rhythm, normal S1 S2, no S3 or S4, no murmur, click or rub, no peripheral edema and peripheral pulses strong- initially I had a hard time palpating pedal pulses, but when lying a bit were present. Not sure if due to positioning  ABDOMEN: soft, nontender, no hepatosplenomegaly, no masses and bowel sounds normal   (male): no hernias  MS: no gross musculoskeletal defects noted, no edema  SKIN: no suspicious lesions or rashes  NEURO: Normal strength and tone, mentation intact and speech normal  PSYCH: mentation appears normal, " affect normal/bright  Pedal pulses initially not palpable.   Diagnostic Test Results:  Labs reviewed in Epic  Results for orders placed or performed in visit on 03/30/22   Albumin Random Urine Quantitative with Creat Ratio     Status: Abnormal   Result Value Ref Range    Creatinine Urine mg/dL 166 mg/dL    Albumin Urine mg/L 120 mg/L    Albumin Urine mg/g Cr 72.29 (H) 0.00 - 17.00 mg/g Cr   Lipid panel reflex to direct LDL Fasting     Status: Abnormal   Result Value Ref Range    Cholesterol 181 <200 mg/dL    Triglycerides 83 <150 mg/dL    Direct Measure HDL 61 >=40 mg/dL    LDL Cholesterol Calculated 103 (H) <=100 mg/dL    Non HDL Cholesterol 120 <130 mg/dL    Patient Fasting > 8hrs? Yes     Narrative    Cholesterol  Desirable:  <200 mg/dL    Triglycerides  Normal:  Less than 150 mg/dL  Borderline High:  150-199 mg/dL  High:  200-499 mg/dL  Very High:  Greater than or equal to 500 mg/dL    Direct Measure HDL  Female:  Greater than or equal to 50 mg/dL   Male:  Greater than or equal to 40 mg/dL    LDL Cholesterol  Desirable:  <100mg/dL  Above Desirable:  100-129 mg/dL   Borderline High:  130-159 mg/dL   High:  160-189 mg/dL   Very High:  >= 190 mg/dL    Non HDL Cholesterol  Desirable:  130 mg/dL  Above Desirable:  130-159 mg/dL  Borderline High:  160-189 mg/dL  High:  190-219 mg/dL  Very High:  Greater than or equal to 220 mg/dL   COMPREHENSIVE METABOLIC PANEL     Status: Abnormal   Result Value Ref Range    Sodium 141 133 - 144 mmol/L    Potassium 5.2 3.4 - 5.3 mmol/L    Chloride 109 94 - 109 mmol/L    Carbon Dioxide (CO2) 28 20 - 32 mmol/L    Anion Gap 4 3 - 14 mmol/L    Urea Nitrogen 15 7 - 30 mg/dL    Creatinine 1.11 0.66 - 1.25 mg/dL    Calcium 9.6 8.5 - 10.1 mg/dL    Glucose 109 (H) 70 - 99 mg/dL    Alkaline Phosphatase 71 40 - 150 U/L    AST 24 0 - 45 U/L    ALT 33 0 - 70 U/L    Protein Total 7.8 6.8 - 8.8 g/dL    Albumin 3.9 3.4 - 5.0 g/dL    Bilirubin Total 0.4 0.2 - 1.3 mg/dL    GFR Estimate 71 >60  mL/min/1.73m2   TSH with free T4 reflex     Status: Abnormal   Result Value Ref Range    TSH 6.49 (H) 0.40 - 4.00 mU/L   T4 free     Status: Normal   Result Value Ref Range    Free T4 0.79 0.76 - 1.46 ng/dL       ASSESSMENT / PLAN:       ICD-10-CM    1. Encounter for Medicare annual wellness exam  Z00.00 TSH with free T4 reflex     TSH with free T4 reflex     T4 free   2. Hyperlipidemia LDL goal <100  E78.5 atorvastatin (LIPITOR) 80 MG tablet   3. Coronary artery disease involving native coronary artery of native heart without angina pectoris  I25.10 atorvastatin (LIPITOR) 80 MG tablet     Adult Cardiology Eval  Referral   4. Essential hypertension with goal blood pressure less than 130/80  I10 COMPREHENSIVE METABOLIC PANEL     lisinopril (ZESTRIL) 20 MG tablet     metoprolol succinate ER (TOPROL-XL) 25 MG 24 hr tablet     COMPREHENSIVE METABOLIC PANEL   5. CKD (chronic kidney disease) stage 2, GFR 60-89 ml/min  N18.2 COMPREHENSIVE METABOLIC PANEL     Albumin Random Urine Quantitative with Creat Ratio     lisinopril (ZESTRIL) 20 MG tablet     Albumin Random Urine Quantitative with Creat Ratio     COMPREHENSIVE METABOLIC PANEL   6. Screening for colon cancer  Z12.11 Adult Gastro Ref - Procedure Only   7. Gastroesophageal reflux disease without esophagitis  K21.9 LANsoprazole (PREVACID) 15 MG DR capsule   8. Erectile dysfunction, unspecified erectile dysfunction type  N52.9 sildenafil (VIAGRA) 100 MG tablet   9. Pain of left calf  M79.662 US JULY Doppler with Exercise Bilateral     US JULY Doppler No Exercise   10. Claudication, intermittent (H)  I73.9 US JULY Doppler with Exercise Bilateral     US JULY Doppler No Exercise   11. Elevated TSH  R79.89 TSH with free T4 reflex   12. Elevated glucose  R73.09 Hemoglobin A1c   13. Myalgia, other site   M79.18 TSH with free T4 reflex       Patient has been advised of split billing requirements and indicates understanding: Yes    COUNSELING:  Reviewed preventive health  "counseling, as reflected in patient instructions       Regular exercise       Healthy diet/nutrition       Vision screening       Colon cancer screening       Prostate cancer screening       - stopping PSA screens  Symptoms of Intermittent claudication?  HX of CABG- 2002,  Arm harvesting. Warrants further eval. Will refer to vascular if needed.   Routine cardiology follow-up  HTN, HLD- controlled. Updating labs/mes refilled. Healthy habits reinforced.     Gerd- controlled on PPI  Viagra is helpful, med refilled  Estimated body mass index is 31.02 kg/m  as calculated from the following:    Height as of this encounter: 1.74 m (5' 8.5\").    Weight as of this encounter: 93.9 kg (207 lb).    Weight management plan: Discussed healthy diet and exercise guidelines    He reports that he quit smoking about 35 years ago. He has a 15.00 pack-year smoking history. He has never used smokeless tobacco.      Appropriate preventive services were discussed with this patient, including applicable screening as appropriate for cardiovascular disease, diabetes, osteopenia/osteoporosis, and glaucoma.  As appropriate for age/gender, discussed screening for colorectal cancer, prostate cancer, breast cancer, and cervical cancer. Checklist reviewing preventive services available has been given to the patient.    Reviewed patients plan of care and provided an AVS. The Intermediate Care Plan ( asthma action plan, low back pain action plan, and migraine action plan) for Kendrick meets the Care Plan requirement. This Care Plan has been established and reviewed with the Patient.    Counseling Resources:  ATP IV Guidelines  Pooled Cohorts Equation Calculator  Breast Cancer Risk Calculator  Breast Cancer: Medication to Reduce Risk  FRAX Risk Assessment  ICSI Preventive Guidelines  Dietary Guidelines for Americans, 2010  [employer]'s MyPlate  ASA Prophylaxis  Lung CA Screening    JOSE [facility] " Risks:

## 2022-02-16 NOTE — PATIENT INSTRUCTIONS
Patient Education   Personalized Prevention Plan  You are due for the preventive services outlined below.  Your care team is available to assist you in scheduling these services.  If you have already completed any of these items, please share that information with your care team to update in your medical record.  Health Maintenance Due   Topic Date Due     ANNUAL REVIEW OF HM ORDERS  Never done     PHQ-2  01/01/2022     Comprehensive Metabolic Panel  01/29/2022     Cholesterol Lab  01/29/2022     FALL RISK ASSESSMENT  01/29/2022     Zoster (Shingles) Vaccine (3 of 3) 03/26/2021     Annual Wellness Visit  01/29/2022     Basic Metabolic Panel  03/05/2022     Kidney Microalbumin Urine Test  03/05/2022

## 2022-03-30 ENCOUNTER — OFFICE VISIT (OUTPATIENT)
Dept: FAMILY MEDICINE | Facility: CLINIC | Age: 72
End: 2022-03-30
Payer: MEDICARE

## 2022-03-30 VITALS
RESPIRATION RATE: 18 BRPM | OXYGEN SATURATION: 97 % | HEART RATE: 61 BPM | SYSTOLIC BLOOD PRESSURE: 124 MMHG | WEIGHT: 207 LBS | BODY MASS INDEX: 30.66 KG/M2 | HEIGHT: 69 IN | TEMPERATURE: 97.7 F | DIASTOLIC BLOOD PRESSURE: 70 MMHG

## 2022-03-30 DIAGNOSIS — N52.9 ERECTILE DYSFUNCTION, UNSPECIFIED ERECTILE DYSFUNCTION TYPE: ICD-10-CM

## 2022-03-30 DIAGNOSIS — I25.10 CORONARY ARTERY DISEASE INVOLVING NATIVE CORONARY ARTERY OF NATIVE HEART WITHOUT ANGINA PECTORIS: ICD-10-CM

## 2022-03-30 DIAGNOSIS — R79.89 ELEVATED TSH: ICD-10-CM

## 2022-03-30 DIAGNOSIS — Z00.00 ENCOUNTER FOR MEDICARE ANNUAL WELLNESS EXAM: Primary | ICD-10-CM

## 2022-03-30 DIAGNOSIS — K21.9 GASTROESOPHAGEAL REFLUX DISEASE WITHOUT ESOPHAGITIS: ICD-10-CM

## 2022-03-30 DIAGNOSIS — I10 ESSENTIAL HYPERTENSION WITH GOAL BLOOD PRESSURE LESS THAN 130/80: ICD-10-CM

## 2022-03-30 DIAGNOSIS — E78.5 HYPERLIPIDEMIA LDL GOAL <100: ICD-10-CM

## 2022-03-30 DIAGNOSIS — M79.18 MYALGIA, OTHER SITE: ICD-10-CM

## 2022-03-30 DIAGNOSIS — R73.09 ELEVATED GLUCOSE: ICD-10-CM

## 2022-03-30 DIAGNOSIS — M79.662 PAIN OF LEFT CALF: ICD-10-CM

## 2022-03-30 DIAGNOSIS — I73.9 CLAUDICATION, INTERMITTENT (H): ICD-10-CM

## 2022-03-30 DIAGNOSIS — Z12.11 SCREENING FOR COLON CANCER: ICD-10-CM

## 2022-03-30 DIAGNOSIS — N18.2 CKD (CHRONIC KIDNEY DISEASE) STAGE 2, GFR 60-89 ML/MIN: ICD-10-CM

## 2022-03-30 LAB
ALBUMIN SERPL-MCNC: 3.9 G/DL (ref 3.4–5)
ALP SERPL-CCNC: 71 U/L (ref 40–150)
ALT SERPL W P-5'-P-CCNC: 33 U/L (ref 0–70)
ANION GAP SERPL CALCULATED.3IONS-SCNC: 4 MMOL/L (ref 3–14)
AST SERPL W P-5'-P-CCNC: 24 U/L (ref 0–45)
BILIRUB SERPL-MCNC: 0.4 MG/DL (ref 0.2–1.3)
BUN SERPL-MCNC: 15 MG/DL (ref 7–30)
CALCIUM SERPL-MCNC: 9.6 MG/DL (ref 8.5–10.1)
CHLORIDE BLD-SCNC: 109 MMOL/L (ref 94–109)
CHOLEST SERPL-MCNC: 181 MG/DL
CO2 SERPL-SCNC: 28 MMOL/L (ref 20–32)
CREAT SERPL-MCNC: 1.11 MG/DL (ref 0.66–1.25)
CREAT UR-MCNC: 166 MG/DL
FASTING STATUS PATIENT QL REPORTED: YES
GFR SERPL CREATININE-BSD FRML MDRD: 71 ML/MIN/1.73M2
GLUCOSE BLD-MCNC: 109 MG/DL (ref 70–99)
HDLC SERPL-MCNC: 61 MG/DL
LDLC SERPL CALC-MCNC: 103 MG/DL
MICROALBUMIN UR-MCNC: 120 MG/L
MICROALBUMIN/CREAT UR: 72.29 MG/G CR (ref 0–17)
NONHDLC SERPL-MCNC: 120 MG/DL
POTASSIUM BLD-SCNC: 5.2 MMOL/L (ref 3.4–5.3)
PROT SERPL-MCNC: 7.8 G/DL (ref 6.8–8.8)
SODIUM SERPL-SCNC: 141 MMOL/L (ref 133–144)
T4 FREE SERPL-MCNC: 0.79 NG/DL (ref 0.76–1.46)
TRIGL SERPL-MCNC: 83 MG/DL
TSH SERPL DL<=0.005 MIU/L-ACNC: 6.49 MU/L (ref 0.4–4)

## 2022-03-30 PROCEDURE — 80053 COMPREHEN METABOLIC PANEL: CPT | Performed by: NURSE PRACTITIONER

## 2022-03-30 PROCEDURE — 80061 LIPID PANEL: CPT | Performed by: NURSE PRACTITIONER

## 2022-03-30 PROCEDURE — 84443 ASSAY THYROID STIM HORMONE: CPT | Performed by: NURSE PRACTITIONER

## 2022-03-30 PROCEDURE — 36415 COLL VENOUS BLD VENIPUNCTURE: CPT | Performed by: NURSE PRACTITIONER

## 2022-03-30 PROCEDURE — 82043 UR ALBUMIN QUANTITATIVE: CPT | Performed by: NURSE PRACTITIONER

## 2022-03-30 PROCEDURE — G0439 PPPS, SUBSEQ VISIT: HCPCS | Performed by: NURSE PRACTITIONER

## 2022-03-30 PROCEDURE — 99214 OFFICE O/P EST MOD 30 MIN: CPT | Mod: 25 | Performed by: NURSE PRACTITIONER

## 2022-03-30 PROCEDURE — 84439 ASSAY OF FREE THYROXINE: CPT | Performed by: NURSE PRACTITIONER

## 2022-03-30 RX ORDER — SILDENAFIL 100 MG/1
TABLET, FILM COATED ORAL
Qty: 36 TABLET | Refills: 1 | Status: SHIPPED | OUTPATIENT
Start: 2022-03-30 | End: 2023-07-17

## 2022-03-30 RX ORDER — METOPROLOL SUCCINATE 25 MG/1
TABLET, EXTENDED RELEASE ORAL
Qty: 45 TABLET | Refills: 3 | Status: SHIPPED | OUTPATIENT
Start: 2022-03-30 | End: 2022-07-14

## 2022-03-30 RX ORDER — ATORVASTATIN CALCIUM 80 MG/1
80 TABLET, FILM COATED ORAL DAILY
Qty: 90 TABLET | Refills: 3 | Status: SHIPPED | OUTPATIENT
Start: 2022-03-30 | End: 2022-08-03 | Stop reason: ALTCHOICE

## 2022-03-30 RX ORDER — MECOBALAMIN 5000 MCG
15 TABLET,DISINTEGRATING ORAL DAILY
Qty: 90 CAPSULE | Refills: 3 | Status: SHIPPED | OUTPATIENT
Start: 2022-03-30 | End: 2023-03-31

## 2022-03-30 RX ORDER — LISINOPRIL 20 MG/1
20 TABLET ORAL DAILY
Qty: 90 TABLET | Refills: 3 | Status: SHIPPED | OUTPATIENT
Start: 2022-03-30 | End: 2023-04-25

## 2022-03-30 ASSESSMENT — ENCOUNTER SYMPTOMS
HEADACHES: 0
HEMATURIA: 0
DIARRHEA: 0
FREQUENCY: 1
PARESTHESIAS: 0
FEVER: 0
HEMATOCHEZIA: 0
ARTHRALGIAS: 0
CHILLS: 0
SORE THROAT: 0
NERVOUS/ANXIOUS: 0
MYALGIAS: 0
EYE PAIN: 0
NAUSEA: 0
COUGH: 0
PALPITATIONS: 0
DIZZINESS: 0
SHORTNESS OF BREATH: 0
WEAKNESS: 0
ABDOMINAL PAIN: 0
CONSTIPATION: 0
DYSURIA: 0
JOINT SWELLING: 0
HEARTBURN: 0

## 2022-03-30 ASSESSMENT — ACTIVITIES OF DAILY LIVING (ADL): CURRENT_FUNCTION: NO ASSISTANCE NEEDED

## 2022-03-30 ASSESSMENT — PAIN SCALES - GENERAL: PAINLEVEL: NO PAIN (0)

## 2022-03-30 NOTE — RESULT ENCOUNTER NOTE
Aaron Marks,  Your kidney function is remaining stable.  Mild elevation in your blood sugar.  Continue to work on your healthy exercise and mild weight reduction.  We can recheck you for diabetes at your next physical, but continue to work on healthy habits.  Cholesterol is in good range.  Your thyroid is showing a slight elevation but the actual T4 hormone is normal.  People can have some mild variations in this.  I would like you to have this thyroid lab rechecked again with a lab only appointment in 2 months.  No medication changes are recommended, continue plan.  Sincerely, Naima Petty DNP

## 2022-03-31 ENCOUNTER — TELEPHONE (OUTPATIENT)
Dept: GASTROENTEROLOGY | Facility: CLINIC | Age: 72
End: 2022-03-31
Payer: MEDICARE

## 2022-03-31 DIAGNOSIS — Z11.59 ENCOUNTER FOR SCREENING FOR OTHER VIRAL DISEASES: Primary | ICD-10-CM

## 2022-03-31 NOTE — TELEPHONE ENCOUNTER
Screening Questions  BlueKIND OF PREP RedLOCATION [review exclusion criteria] GreenSEDATION TYPE  1. Have you had a positive covid test in the last 90 days? N     2. Are you active on mychart? Y    3. What insurance is in the chart? Medicare     3.   Ordering/Referring Provider: Naima Petty APRN CNP     4. BMI 31.5 [BMI OVER 40-EXTENDED PREP]  If greater than 40 review exclusion criteria [PAC APPT IF @ [facility]]        5.  Respiratory Screening :  [If yes to any of the following HOSPITAL setting only]     Do you use daily home oxygen? N    Do you have mod to severe Obstructive Sleep Apnea? N  [OKAY @ [facility] SH PH RI]   Do you have Pulmonary Hypertension? N     Do you have UNCONTROLLED asthma? N        6.   Have you had a heart or lung transplant? N      7.   Are you currently on dialysis? N [ If yes, G-PREP & HOSPITAL setting only]     8.   Do you have chronic kidney disease? N [ If yes, G-PREP ]    9.   Have you had a stroke or Transient ischemic attack (TIA - aka  mini stroke ) within 6 months?  N (If yes, please review exclusion criteria)    10.   In the past 6 months, have you had any heart related issues including cardiomyopathy or heart attack? N           If yes, did it require cardiac stenting or other implantable device? NA      11.   Do you have any implantable devices in your body (pacemaker, defib, LVAD)? N (If yes, please review exclusion criteria)    12.   Do you take nitroglycerin? N           If yes, how often? NA  (if yes, HOSPITAL setting ONLY)    13.   Are you currently taking any blood thinners? N           [IF YES, INFORM PATIENT TO FOLLOW UP W/ ORDERING PROVIDER FOR BRIDGING INSTRUCTIONS]     14.   Do you have a diagnosis of diabetes? N   [ If yes, G-PREP ]    15.   [FEMALES] Are you currently pregnant? NA    If yes, how many weeks? NA    16.   Are you taking any prescription pain medications on a routine schedule?  N  [ If yes, EXTENDED PREP.] [If yes, MAC]    17.   Do you have any  chemical dependencies such as alcohol, street drugs, or methadone?  N [If yes, MAC]    18.   Do you have any history of post-traumatic stress syndrome, severe anxiety or history of psychosis?  N  [If yes, MAC]    19.   Do you have a significant intellectual disability?  N [If yes, MAC]    20.   Do you transfer independently?  Y    21.  On a regular basis do you go 3-5 days between bowel movements? N   [ If yes, EXTENDED PREP.]    22.   Preferred LOCAL Pharmacy for Pre Prescription   [id number] [address]      Scheduling Details      Caller : Kendrick Castellon  (Please ask for phone number if not scheduled by patient)    Type of Procedure Scheduled: Colon  Which Colonoscopy Prep was Sent?: Miralax  KHORUTS CF PATIENTS & GROEN'S PATIENTS NEEDS EXTENDED PREP  Surgeon: Samantha  Date of Procedure: 4/21  Location: [facility]       Sedation Type: CS  Conscious Sedation- Needs  for 6 hours after the procedure  MAC/General-Needs  for 24 hours after procedure    Pre-op Required at [facility], [place], [place] and OR for MAC sedation: N  (advise patient they will need a pre-op prior to procedure -)      Informed patient they will need an adult  Y  Cannot take any type of public or medical transportation alone    Pre-Procedure Covid test to be completed at [facility] or Externally: 4/18, [place]    Confirmed Nurse will call to complete assessment Y    Additional comments:

## 2022-04-09 ENCOUNTER — TELEPHONE (OUTPATIENT)
Dept: GASTROENTEROLOGY | Facility: CLINIC | Age: 72
End: 2022-04-09
Payer: MEDICARE

## 2022-04-09 DIAGNOSIS — Z11.59 ENCOUNTER FOR SCREENING FOR OTHER VIRAL DISEASES: Primary | ICD-10-CM

## 2022-04-09 NOTE — TELEPHONE ENCOUNTER
Caller: Chelsea Harvey    Procedure: Colon    Date, Location, and Surgeon of Procedure Cancelled: 4-21/SH/ Samantha/ PO    Ordering Provider: Naima Petty,     Reason for cancel (please be detailed, any staff messages or encounters to note?):   is out        Rescheduled: Y     If rescheduled:    Date: 5-4   Location:    Prep Resent: Y(changes to prep?)   Covid Test Rescheduled: Yes and No   Note any change or update to original order/sedation: Antoine  PCR: 4-30 BL

## 2022-04-30 ENCOUNTER — LAB (OUTPATIENT)
Dept: URGENT CARE | Facility: URGENT CARE | Age: 72
End: 2022-04-30
Payer: MEDICARE

## 2022-04-30 DIAGNOSIS — Z11.59 ENCOUNTER FOR SCREENING FOR OTHER VIRAL DISEASES: ICD-10-CM

## 2022-04-30 LAB — SARS-COV-2 RNA RESP QL NAA+PROBE: NEGATIVE

## 2022-04-30 PROCEDURE — U0003 INFECTIOUS AGENT DETECTION BY NUCLEIC ACID (DNA OR RNA); SEVERE ACUTE RESPIRATORY SYNDROME CORONAVIRUS 2 (SARS-COV-2) (CORONAVIRUS DISEASE [COVID-19]), AMPLIFIED PROBE TECHNIQUE, MAKING USE OF HIGH THROUGHPUT TECHNOLOGIES AS DESCRIBED BY CMS-2020-01-R: HCPCS

## 2022-04-30 PROCEDURE — U0005 INFEC AGEN DETEC AMPLI PROBE: HCPCS

## 2022-05-02 ENCOUNTER — OFFICE VISIT (OUTPATIENT)
Dept: CARDIOLOGY | Facility: CLINIC | Age: 72
End: 2022-05-02
Payer: MEDICARE

## 2022-05-02 ENCOUNTER — MYC MEDICAL ADVICE (OUTPATIENT)
Dept: FAMILY MEDICINE | Facility: CLINIC | Age: 72
End: 2022-05-02

## 2022-05-02 VITALS
WEIGHT: 203 LBS | HEIGHT: 69 IN | DIASTOLIC BLOOD PRESSURE: 76 MMHG | SYSTOLIC BLOOD PRESSURE: 130 MMHG | BODY MASS INDEX: 30.07 KG/M2 | HEART RATE: 79 BPM

## 2022-05-02 DIAGNOSIS — I10 ESSENTIAL HYPERTENSION: Primary | ICD-10-CM

## 2022-05-02 DIAGNOSIS — E78.2 MIXED HYPERLIPIDEMIA: ICD-10-CM

## 2022-05-02 DIAGNOSIS — I25.10 CORONARY ARTERY DISEASE INVOLVING NATIVE CORONARY ARTERY OF NATIVE HEART WITHOUT ANGINA PECTORIS: ICD-10-CM

## 2022-05-02 PROCEDURE — 93000 ELECTROCARDIOGRAM COMPLETE: CPT | Performed by: INTERNAL MEDICINE

## 2022-05-02 PROCEDURE — 99214 OFFICE O/P EST MOD 30 MIN: CPT | Performed by: INTERNAL MEDICINE

## 2022-05-02 NOTE — PROGRESS NOTES
CARDIOLOGY CLINIC FOLLOW-UP NOTE      REASON FOR VISIT:   Follow-up CAD, [facility]    PRIMARY CARE PHYSICIAN:  Naima Petty        History of Present Illness   Kendrick Castellon is an extremely pleasant 72 year old male here to [facility].  He had last seen Dr. Blank in 12/2019.  His past medical history is significant for CAD s/p 3v CABG (unknown graft targets; per patient has biradial grafts, no SVG) in [place], [place] in 12/2002, hypertension, dyslipidemia, and GERD.  He is a former smoker.    Since his last visit, he reports that he has been doing very well from a cardiac standpoint.  His only complaint is of pain in his left calf when walking.  This had been occurring anytime he would walk for around a mile, it would be quite painful and forced him to stop.  However, he has now been doing a warm up elliptical routine before walking, and this has significantly improved the symptoms.  While the symptoms have not completely resolved, he has been walking up to 10 miles per day for the past 5-6 weeks.  Otherwise, he has been feeling well with no chest pains, shortness of breath, lightheadedness, palpitations, or lower extremity swelling.  No ulcerations on his feet or nonhealing wounds.  His cholesterol has been slowly rising over the past few years, and he admits that his diet is not as healthy as it used to be.    His most recent labs were from 3/30/2022, showing a total cholesterol of 181, HDL of 61, LDL of 103, triglycerides of 83, normal sodium and potassium, creatinine of 1.11 with estimated GFR of 71, ALT of 33, and TSH of 6.49.  His most recent echocardiogram from 6/25/2019 was essentially normal.  His last ischemic evaluation was an exercise MPI from 5/18/2016 showing a very small area of distal anterolateral ischemia, but was otherwise normal.  We do not currently have the op report from his bypass surgery.      Assessment & Plan     1. Exertional calf pain concerning for symptomatic claudication    2. CAD s/p 3v CABG (unknown graft targets; per patient has biradial grafts, no SVG) in [place], [place] in 12/2002, CCS 0 angina  3. Hypertension, well controlled   4. Dyslipidemia, with suboptimal control  5. Former tobacco abuse      It was a pleasure to meet with Kendrick in clinic today.  I agree with the recommendation by his PCP, JOSE Dang CNP, to check an exercise JULY.  He has not scheduled this yet but I encouraged him to do so.  Thankfully, he does not have any ulcers, nonhealing wounds, etc. that are concerning for critical limb ischemia.  He also seems to be making progress with essentially his equivalent of a supervised exercise program, so I would suspect that even if his JULY is abnormal, we can start with conservative management first.  That said, I do think that we should check the JULY, so I encouraged him to schedule this.    Otherwise, we talked about his lipids, which currently are suboptimally controlled.  I would like to target a goal LDL of at least less than 70.  He thinks that he can significantly improve his diet, and would like to start with this.  I think that is very reasonable.  We will recheck a lipid panel in 3 months, and then can consider a switch to Crestor 40 mg daily if this is insufficient.    Finally, I did encourage Kendrick to reach out to his hospital in [place] to get the op report for his bypass surgery, as this would be very helpful for us if he were to need an angiogram in the future.      -Encouraged Kendrick to obtain a copy of his bypass op report and we will uploaded to Epic once available  - Exercise JULY as previously ordered  - Continue excellent home exercise regimen  - Patient to work on reducing cholesterol intake in diet for the next 3 months, with repeat lipid panel at that time  - Continue Lipitor 80 mg daily for now; consider change to Crestor 40 mg daily if dietary improvements insufficient  - Continue aspirin 81 mg daily  - Continue lisinopril 20 mg daily and  Toprol-XL 12.5 mg daily        Follow-up: 1 year, or sooner as needed      Chirag Hdz MD  Interventional Cardiology  May 2, 2022        Medications   Current Outpatient Medications   Medication     aspirin 81 MG tablet     atorvastatin (LIPITOR) 80 MG tablet     LANsoprazole (PREVACID) 15 MG DR capsule     lisinopril (ZESTRIL) 20 MG tablet     metoprolol succinate ER (TOPROL-XL) 25 MG 24 hr tablet     sildenafil (VIAGRA) 100 MG tablet     No current facility-administered medications for this visit.     Allergies   No Known Allergies      Physical Exam       BP: 130/76 Pulse: 79            Vital Signs with Ranges  Pulse:  [79] 79  BP: (130)/(76) 130/76  203 lbs 0 oz    Constitutional: Well-appearing, no acute distress  Respiratory: Normal respiratory effort, CTAB  Cardiovascular: RRR, faint early peaking systolic murmur at the base.  JVP < 7 cm H2O.  There is no LE edema.  Normal carotid upstrokes, no carotid bruits.  Well-healed surgical scars on both forearms

## 2022-05-02 NOTE — LETTER
5/2/2022    Naima Petty, APRN CNP  [address] [facility] [address]    RE: Kendrick Castellon       Dear Colleague,     I had the pleasure of seeing Kendrick Castellon in the [facility].  CARDIOLOGY CLINIC FOLLOW-UP NOTE      REASON FOR VISIT:   Follow-up CAD, [facility]    PRIMARY CARE PHYSICIAN:  Naima Petty        History of Present Illness   Kendrick Castellon is an extremely pleasant 72 year old male here to [facility].  He had last seen Dr. Blank in 12/2019.  His past medical history is significant for CAD s/p 3v CABG (unknown graft targets; per patient has biradial grafts, no SVG) in [place], [place] in 12/2002, hypertension, dyslipidemia, and GERD.  He is a former smoker.    Since his last visit, he reports that he has been doing very well from a cardiac standpoint.  His only complaint is of pain in his left calf when walking.  This had been occurring anytime he would walk for around a mile, it would be quite painful and forced him to stop.  However, he has now been doing a warm up elliptical routine before walking, and this has significantly improved the symptoms.  While the symptoms have not completely resolved, he has been walking up to 10 miles per day for the past 5-6 weeks.  Otherwise, he has been feeling well with no chest pains, shortness of breath, lightheadedness, palpitations, or lower extremity swelling.  No ulcerations on his feet or nonhealing wounds.  His cholesterol has been slowly rising over the past few years, and he admits that his diet is not as healthy as it used to be.    His most recent labs were from 3/30/2022, showing a total cholesterol of 181, HDL of 61, LDL of 103, triglycerides of 83, normal sodium and potassium, creatinine of 1.11 with estimated GFR of 71, ALT of 33, and TSH of 6.49.  His most recent echocardiogram from 6/25/2019 was essentially normal.  His last ischemic evaluation was an exercise MPI from 5/18/2016 showing a very small area of distal  anterolateral ischemia, but was otherwise normal.  We do not currently have the op report from his bypass surgery.      Assessment & Plan     1. Exertional calf pain concerning for symptomatic claudication   2. CAD s/p 3v CABG (unknown graft targets; per patient has biradial grafts, no SVG) in [place], [place] in 12/2002, CCS 0 angina  3. Hypertension, well controlled   4. Dyslipidemia, with suboptimal control  5. Former tobacco abuse      It was a pleasure to meet with Kendrick in clinic today.  I agree with the recommendation by his PCP, JOSE Dang CNP, to check an exercise JULY.  He has not scheduled this yet but I encouraged him to do so.  Thankfully, he does not have any ulcers, nonhealing wounds, etc. that are concerning for critical limb ischemia.  He also seems to be making progress with essentially his equivalent of a supervised exercise program, so I would suspect that even if his JULY is abnormal, we can start with conservative management first.  That said, I do think that we should check the JULY, so I encouraged him to schedule this.    Otherwise, we talked about his lipids, which currently are suboptimally controlled.  I would like to target a goal LDL of at least less than 70.  He thinks that he can significantly improve his diet, and would like to start with this.  I think that is very reasonable.  We will recheck a lipid panel in 3 months, and then can consider a switch to Crestor 40 mg daily if this is insufficient.    Finally, I did encourage Kendrick to reach out to his hospital in [place] to get the op report for his bypass surgery, as this would be very helpful for us if he were to need an angiogram in the future.      -Encouraged Kendrick to obtain a copy of his bypass op report and we will uploaded to Epic once available  - Exercise JULY as previously ordered  - Continue excellent home exercise regimen  - Patient to work on reducing cholesterol intake in diet for the next 3 months, with repeat lipid panel at  that time  - Continue Lipitor 80 mg daily for now; consider change to Crestor 40 mg daily if dietary improvements insufficient  - Continue aspirin 81 mg daily  - Continue lisinopril 20 mg daily and Toprol-XL 12.5 mg daily        Follow-up: 1 year, or sooner as needed      Chirag Hdz MD  Interventional Cardiology  May 2, 2022        Medications   Current Outpatient Medications   Medication     aspirin 81 MG tablet     atorvastatin (LIPITOR) 80 MG tablet     LANsoprazole (PREVACID) 15 MG DR capsule     lisinopril (ZESTRIL) 20 MG tablet     metoprolol succinate ER (TOPROL-XL) 25 MG 24 hr tablet     sildenafil (VIAGRA) 100 MG tablet     No current facility-administered medications for this visit.     Allergies   No Known Allergies      Physical Exam       BP: 130/76 Pulse: 79            Vital Signs with Ranges  Pulse:  [79] 79  BP: (130)/(76) 130/76  203 lbs 0 oz    Constitutional: Well-appearing, no acute distress  Respiratory: Normal respiratory effort, CTAB  Cardiovascular: RRR, faint early peaking systolic murmur at the base.  JVP < 7 cm H2O.  There is no LE edema.  Normal carotid upstrokes, no carotid bruits.  Well-healed surgical scars on both forearms    Thank you for allowing me to participate in the care of your patient.      Sincerely,     Chirag Hdz MD     [facility]  cc:   Referred Self

## 2022-05-02 NOTE — TELEPHONE ENCOUNTER
Responded via [employer].  DMITRI CaballeroN, RN  [facility] [place]  May 2, 2022

## 2022-05-04 ENCOUNTER — HOSPITAL ENCOUNTER (OUTPATIENT)
Facility: CLINIC | Age: 72
Discharge: HOME | End: 2022-05-04
Attending: INTERNAL MEDICINE | Admitting: INTERNAL MEDICINE
Payer: MEDICARE

## 2022-05-04 VITALS
TEMPERATURE: 97 F | DIASTOLIC BLOOD PRESSURE: 89 MMHG | RESPIRATION RATE: 11 BRPM | SYSTOLIC BLOOD PRESSURE: 171 MMHG | OXYGEN SATURATION: 97 % | HEART RATE: 71 BPM

## 2022-05-04 LAB — COLONOSCOPY: NORMAL

## 2022-05-04 PROCEDURE — 250N000011 HC RX IP 250 OP 636: Performed by: INTERNAL MEDICINE

## 2022-05-04 PROCEDURE — G0500 MOD SEDAT ENDO SERVICE >5YRS: HCPCS | Performed by: INTERNAL MEDICINE

## 2022-05-04 PROCEDURE — 45385 COLONOSCOPY W/LESION REMOVAL: CPT | Performed by: INTERNAL MEDICINE

## 2022-05-04 PROCEDURE — 88305 TISSUE EXAM BY PATHOLOGIST: CPT | Mod: TC | Performed by: INTERNAL MEDICINE

## 2022-05-04 RX ORDER — NALOXONE HYDROCHLORIDE 0.4 MG/ML
0.2 INJECTION, SOLUTION INTRAMUSCULAR; INTRAVENOUS; SUBCUTANEOUS
Status: DISCONTINUED | OUTPATIENT
Start: 2022-05-04 | End: 2022-05-04 | Stop reason: HOSPADM

## 2022-05-04 RX ORDER — NALOXONE HYDROCHLORIDE 0.4 MG/ML
0.4 INJECTION, SOLUTION INTRAMUSCULAR; INTRAVENOUS; SUBCUTANEOUS
Status: DISCONTINUED | OUTPATIENT
Start: 2022-05-04 | End: 2022-05-04 | Stop reason: HOSPADM

## 2022-05-04 RX ORDER — ONDANSETRON 2 MG/ML
4 INJECTION INTRAMUSCULAR; INTRAVENOUS EVERY 6 HOURS PRN
Status: DISCONTINUED | OUTPATIENT
Start: 2022-05-04 | End: 2022-05-04 | Stop reason: HOSPADM

## 2022-05-04 RX ORDER — ONDANSETRON 4 MG/1
4 TABLET, ORALLY DISINTEGRATING ORAL EVERY 6 HOURS PRN
Status: DISCONTINUED | OUTPATIENT
Start: 2022-05-04 | End: 2022-05-04 | Stop reason: HOSPADM

## 2022-05-04 RX ORDER — LIDOCAINE 40 MG/G
CREAM TOPICAL
Status: DISCONTINUED | OUTPATIENT
Start: 2022-05-04 | End: 2022-05-04 | Stop reason: HOSPADM

## 2022-05-04 RX ORDER — PROCHLORPERAZINE MALEATE 5 MG
5 TABLET ORAL EVERY 6 HOURS PRN
Status: DISCONTINUED | OUTPATIENT
Start: 2022-05-04 | End: 2022-05-04 | Stop reason: HOSPADM

## 2022-05-04 RX ORDER — FLUMAZENIL 0.1 MG/ML
0.2 INJECTION, SOLUTION INTRAVENOUS
Status: DISCONTINUED | OUTPATIENT
Start: 2022-05-04 | End: 2022-05-04 | Stop reason: HOSPADM

## 2022-05-04 RX ORDER — FENTANYL CITRATE 50 UG/ML
INJECTION, SOLUTION INTRAMUSCULAR; INTRAVENOUS PRN
Status: COMPLETED | OUTPATIENT
Start: 2022-05-04 | End: 2022-05-04

## 2022-05-04 RX ORDER — ONDANSETRON 2 MG/ML
4 INJECTION INTRAMUSCULAR; INTRAVENOUS
Status: DISCONTINUED | OUTPATIENT
Start: 2022-05-04 | End: 2022-05-04 | Stop reason: HOSPADM

## 2022-05-04 RX ADMIN — FENTANYL CITRATE 50 MCG: 50 INJECTION, SOLUTION INTRAMUSCULAR; INTRAVENOUS at 10:20

## 2022-05-04 RX ADMIN — MIDAZOLAM 1 MG: 1 INJECTION INTRAMUSCULAR; INTRAVENOUS at 10:23

## 2022-05-04 RX ADMIN — MIDAZOLAM 1 MG: 1 INJECTION INTRAMUSCULAR; INTRAVENOUS at 10:21

## 2022-05-04 RX ADMIN — FENTANYL CITRATE 50 MCG: 50 INJECTION, SOLUTION INTRAMUSCULAR; INTRAVENOUS at 10:22

## 2022-05-04 NOTE — H&P
Kendrick Castellon  [id number]  male  72 year old      Reason for procedure/surgery: colon cancer screening    Patient Active Problem List   Diagnosis     Hyperlipidemia LDL goal <100     GERD (gastroesophageal reflux disease)     Advanced directives, counseling/discussion     Microalbuminuria     CKD (chronic kidney disease) stage 2, GFR 60-89 ml/min     Erectile dysfunction, unspecified erectile dysfunction type     Coronary artery disease involving native coronary artery of native heart without angina pectoris     Abnormal cardiovascular stress test     Essential hypertension with goal blood pressure less than 130/80     Increasing prostate specific antigen level       Past Surgical History:    Past Surgical History:   Procedure Laterality Date     CARDIAC SURGERY  2002    CABG x 3: STATON to LAD, left radial to circ & right radial to RCA     COLONOSCOPY  2001    reported as normal--10 year follow up     COLONOSCOPY  5/10/2012    Procedure:COLONOSCOPY; screening colonoscopy; Surgeon:REN MAR; Location:[facility] OR     VASECTOMY         Past Medical History:   Past Medical History:   Diagnosis Date     CKD (chronic kidney disease) stage 2, GFR 60-89 ml/min 2013     Coronary artery disease involving native coronary artery of native heart without angina pectoris 2015     Erectile dysfunction, unspecified erectile dysfunction type 2015     Essential hypertension with goal blood pressure less than 130/80 2017     GERD (gastroesophageal reflux disease) 2012     Hyperlipidemia LDL goal <100 2012     Hypertension goal BP (blood pressure) < 140/90 2012       Social History:   Social History     Tobacco Use     Smoking status: Former Smoker     Packs/day: 1.00     Years: 15.00     Pack years: 15.00     Quit date: 1987     Years since quittin.1     Smokeless tobacco: Never Used   Substance Use Topics     Alcohol use: Not Currently     Alcohol/week: 11.7 standard drinks      Types: 14 Standard drinks or equivalent per week     Comment: 0-2 perweek        Family History:   Family History   Problem Relation Age of Onset     Diabetes Mother      Heart Disease Father 67        MI     Cancer Maternal Grandmother      Asthma No family hx of      C.A.D. No family hx of      Hypertension No family hx of      Cerebrovascular Disease No family hx of      Breast Cancer No family hx of      Cancer - colorectal No family hx of      Prostate Cancer No family hx of      Alcohol/Drug No family hx of      Allergies No family hx of      Alzheimer Disease No family hx of      Anesthesia Reaction No family hx of      Blood Disease No family hx of      Arthritis No family hx of      Cardiovascular No family hx of      Circulatory No family hx of      Congenital Anomalies No family hx of      Connective Tissue Disorder No family hx of      Depression No family hx of      Eye Disorder No family hx of      Gastrointestinal Disease No family hx of      Genitourinary Problems No family hx of      Genetic Disorder No family hx of      Endocrine Disease No family hx of      Gynecology No family hx of      Lipids No family hx of      Musculoskeletal Disorder No family hx of      Hearing Loss No family hx of      Family History Negative No family hx of      Thyroid Disease No family hx of      Respiratory No family hx of      Psychotic Disorder No family hx of      Osteoporosis No family hx of      Obesity No family hx of      Neurologic Disorder No family hx of      Coronary Artery Disease No family hx of      Hyperlipidemia No family hx of      Ovarian Cancer No family hx of      Other Cancer No family hx of      Mental Illness No family hx of      Known Genetic Syndrome No family hx of      Unknown/Adopted No family hx of      Anxiety Disorder No family hx of      Mental Illness No family hx of      Substance Abuse No family hx of      Colon Cancer No family hx of        Allergies: No Known Allergies    Active  Medications:   No current outpatient medications on file.       Systemic Review:   CONSTITUTIONAL: NEGATIVE for fever, chills, change in weight  ENT/MOUTH: NEGATIVE for ear, mouth and throat problems  RESP: NEGATIVE for significant cough or SOB  CV: NEGATIVE for chest pain, palpitations or peripheral edema    Physical Examination:   Vital Signs: BP (!) 163/91   Pulse 82   Temp 97  F (36.1  C) (Skin)   Resp 16   SpO2 96%   GENERAL: healthy, alert and no distress  NECK: no adenopathy, no asymmetry, masses, or scars  RESP: lungs clear to auscultation - no rales, rhonchi or wheezes  CV: regular rate and rhythm, normal S1 S2, no S3 or S4, no murmur, click or rub, no peripheral edema and peripheral pulses strong  ABDOMEN: soft, nontender, no hepatosplenomegaly, no masses and bowel sounds normal  MS: no gross musculoskeletal defects noted, no edema    ASA Classification: (II)  Mild systemic disease  Airway Exam: Mallampati Score: Class I    Plan: Appropriate to proceed as scheduled.      Jamaal Schultz MD  5/4/2022    PCP:  Naima Petty

## 2022-05-05 LAB
PATH REPORT.COMMENTS IMP SPEC: NORMAL
PATH REPORT.COMMENTS IMP SPEC: NORMAL
PATH REPORT.FINAL DX SPEC: NORMAL
PATH REPORT.GROSS SPEC: NORMAL
PATH REPORT.MICROSCOPIC SPEC OTHER STN: NORMAL
PATH REPORT.RELEVANT HX SPEC: NORMAL
PHOTO IMAGE: NORMAL

## 2022-05-05 PROCEDURE — 88305 TISSUE EXAM BY PATHOLOGIST: CPT | Mod: 26 | Performed by: PATHOLOGY

## 2022-08-02 ENCOUNTER — LAB (OUTPATIENT)
Dept: LAB | Facility: CLINIC | Age: 72
End: 2022-08-02
Payer: MEDICARE

## 2022-08-02 DIAGNOSIS — I25.10 CORONARY ARTERY DISEASE INVOLVING NATIVE CORONARY ARTERY OF NATIVE HEART WITHOUT ANGINA PECTORIS: ICD-10-CM

## 2022-08-02 LAB
ALT SERPL W P-5'-P-CCNC: 28 U/L (ref 0–70)
CHOLEST SERPL-MCNC: 168 MG/DL
FASTING STATUS PATIENT QL REPORTED: YES
HDLC SERPL-MCNC: 56 MG/DL
LDLC SERPL CALC-MCNC: 82 MG/DL
NONHDLC SERPL-MCNC: 112 MG/DL
TRIGL SERPL-MCNC: 152 MG/DL

## 2022-08-02 PROCEDURE — 36415 COLL VENOUS BLD VENIPUNCTURE: CPT | Performed by: INTERNAL MEDICINE

## 2022-08-02 PROCEDURE — 84460 ALANINE AMINO (ALT) (SGPT): CPT | Performed by: INTERNAL MEDICINE

## 2022-08-02 PROCEDURE — 80061 LIPID PANEL: CPT | Performed by: INTERNAL MEDICINE

## 2022-08-03 DIAGNOSIS — E78.2 MIXED HYPERLIPIDEMIA: ICD-10-CM

## 2022-08-03 DIAGNOSIS — I25.10 CORONARY ARTERY DISEASE INVOLVING NATIVE CORONARY ARTERY OF NATIVE HEART WITHOUT ANGINA PECTORIS: Primary | ICD-10-CM

## 2022-08-03 RX ORDER — ROSUVASTATIN CALCIUM 40 MG/1
40 TABLET, COATED ORAL DAILY
Qty: 90 TABLET | Refills: 3 | Status: SHIPPED | OUTPATIENT
Start: 2022-08-03 | End: 2023-05-17

## 2022-09-03 ENCOUNTER — HEALTH MAINTENANCE LETTER (OUTPATIENT)
Age: 72
End: 2022-09-03

## 2022-10-27 DIAGNOSIS — I10 ESSENTIAL HYPERTENSION WITH GOAL BLOOD PRESSURE LESS THAN 130/80: ICD-10-CM

## 2022-10-31 NOTE — TELEPHONE ENCOUNTER
"Pending Prescriptions:                       Disp   Refills    metoprolol succinate ER (TOPROL XL) 25 MG *45 tab*0        Sig: Take 1/2 tablet (12.5 mg) by mouth daily    Routing refill request to provider for review/approval because:  Labs out of range:      Requested Prescriptions   Pending Prescriptions Disp Refills    metoprolol succinate ER (TOPROL XL) 25 MG 24 hr tablet [Pharmacy Med Name: Metoprolol Succinate ER Oral Tablet Extended Release 24 Hour 25 MG] 45 tablet 0     Sig: Take 1/2 tablet (12.5 mg) by mouth daily       Beta-Blockers Protocol Failed - 10/27/2022  9:28 AM        Failed - Blood pressure under 140/90 in past 12 months     BP Readings from Last 3 Encounters:   05/04/22 (!) 171/89   05/02/22 130/76   03/30/22 124/70                 Passed - Patient is age 6 or older        Passed - Recent (12 mo) or future (30 days) visit within the authorizing provider's specialty     Patient has had an office visit with the authorizing provider or a provider within the authorizing providers department within the previous 12 mos or has a future within next 30 days. See \"Patient Info\" tab in inbasket, or \"Choose Columns\" in Meds & Orders section of the refill encounter.              Passed - Medication is active on med list                   "

## 2022-11-01 ENCOUNTER — LAB (OUTPATIENT)
Dept: LAB | Facility: CLINIC | Age: 72
End: 2022-11-01
Payer: MEDICARE

## 2022-11-01 DIAGNOSIS — E78.2 MIXED HYPERLIPIDEMIA: ICD-10-CM

## 2022-11-01 DIAGNOSIS — I25.10 CORONARY ARTERY DISEASE INVOLVING NATIVE CORONARY ARTERY OF NATIVE HEART WITHOUT ANGINA PECTORIS: ICD-10-CM

## 2022-11-01 LAB
ALT SERPL W P-5'-P-CCNC: 24 U/L (ref 0–70)
CHOLEST SERPL-MCNC: 182 MG/DL
FASTING STATUS PATIENT QL REPORTED: YES
HDLC SERPL-MCNC: 64 MG/DL
LDLC SERPL CALC-MCNC: 94 MG/DL
NONHDLC SERPL-MCNC: 118 MG/DL
TRIGL SERPL-MCNC: 119 MG/DL

## 2022-11-01 PROCEDURE — 36415 COLL VENOUS BLD VENIPUNCTURE: CPT

## 2022-11-01 PROCEDURE — 84460 ALANINE AMINO (ALT) (SGPT): CPT

## 2022-11-01 PROCEDURE — 80061 LIPID PANEL: CPT

## 2022-11-01 RX ORDER — METOPROLOL SUCCINATE 25 MG/1
TABLET, EXTENDED RELEASE ORAL
Qty: 45 TABLET | Refills: 0 | Status: SHIPPED | OUTPATIENT
Start: 2022-11-01 | End: 2022-12-22

## 2022-11-08 ENCOUNTER — OFFICE VISIT (OUTPATIENT)
Dept: CARDIOLOGY | Facility: CLINIC | Age: 72
End: 2022-11-08
Attending: INTERNAL MEDICINE
Payer: MEDICARE

## 2022-11-08 VITALS
HEIGHT: 69 IN | HEART RATE: 70 BPM | SYSTOLIC BLOOD PRESSURE: 124 MMHG | OXYGEN SATURATION: 98 % | BODY MASS INDEX: 31.1 KG/M2 | WEIGHT: 210 LBS | DIASTOLIC BLOOD PRESSURE: 76 MMHG

## 2022-11-08 DIAGNOSIS — E78.2 MIXED HYPERLIPIDEMIA: Primary | ICD-10-CM

## 2022-11-08 DIAGNOSIS — I25.10 CORONARY ARTERY DISEASE INVOLVING NATIVE CORONARY ARTERY OF NATIVE HEART WITHOUT ANGINA PECTORIS: ICD-10-CM

## 2022-11-08 DIAGNOSIS — I73.9 CLAUDICATION (H): ICD-10-CM

## 2022-11-08 DIAGNOSIS — I10 ESSENTIAL HYPERTENSION: ICD-10-CM

## 2022-11-08 PROCEDURE — 99214 OFFICE O/P EST MOD 30 MIN: CPT | Performed by: INTERNAL MEDICINE

## 2022-11-08 RX ORDER — EZETIMIBE 10 MG/1
10 TABLET ORAL DAILY
Qty: 90 TABLET | Refills: 3 | Status: SHIPPED | OUTPATIENT
Start: 2022-11-08 | End: 2023-07-17

## 2022-11-08 NOTE — PROGRESS NOTES
CARDIOLOGY CLINIC FOLLOW-UP NOTE      REASON FOR VISIT:   Follow-up CAD, [facility]    PRIMARY CARE PHYSICIAN:  Naima Petty        History of Present Illness   Kendrick Castellon is an extremely pleasant 72 year old male here to [facility].  He had last seen Dr. Blank in 12/2019.  His past medical history is significant for CAD s/p 3v CABG (unknown graft targets; per patient has biradial grafts, no SVG) in [place], [place] in 12/2002, hypertension, dyslipidemia, and GERD.  He is a former smoker.    Since his last visit on 5/2/2022, he has continued to do well from a cardiac standpoint.  At his last visit we discussed some claudication in his left calf when walking that usually occurred after about a mile.  Since he started to focus on doing a warm up on elliptical routine beforehand and walking with walking sticks, he feels that this is much better.  Outside of this, he has no chest pains, shortness of breath, or any other cardiac complaints.    His most recent lipids were from 11/1/2022, showing a total cholesterol of 182, HDL 64, LDL 94, and triglycerides 119.  His most recent full set of labs were from 3/30/2022, showing normal sodium and potassium, creatinine of 1.11 with estimated GFR of 71, ALT of 33, and TSH of 6.49.  His most recent echocardiogram from 6/25/2019 was essentially normal.  His last ischemic evaluation was an exercise MPI from 5/18/2016 showing a very small area of distal anterolateral ischemia, but was otherwise normal.  His most recent ECG from 5/2/2022 shows sinus rhythm with RBBB.    Assessment & Plan     1. Exertional calf pain concerning for symptomatic claudication, improving with exercise   2. CAD s/p 3v CABG (unknown graft targets; per patient has biradial grafts, no SVG) in [place], [place] in 12/2002, CCS 0 angina  3. Hypertension, well controlled   4. Dyslipidemia, with suboptimal control  5. RBBB  6. Former tobacco abuse      It was a pleasure to meet with Kendrick again in clinic today.   I am glad to hear that his claudication symptoms have improved with conservative exercise therapy.  He does not have any evidence of ulcers, nonhealing wounds, etc. concerning for critical limb ischemia, so we will continue with conservative management for now.  His 1 lingering issue is his dyslipidemia, which is not currently at goal despite maximally dosed statin of Crestor 40 mg daily.  We discussed potential options, including adding ezetimibe, and he is open to this.  We will start with ezetimibe 10 mg daily and then repeat a lipid panel in 2 to 3 months, and decide on further management options if needed based on these results.      - I have encouraged Kendrick to obtain a copy of his bypass op report and we will uploaded to Epic once available  - Continue excellent home exercise regimen  - Patient to work on reducing cholesterol intake in diet  - Continue Crestor 40 mg daily  - Start ezetimibe 10 mg daily  - Continue aspirin 81 mg daily  - Continue lisinopril 20 mg daily and Toprol-XL 12.5 mg daily        Follow-up: 3 months with DYANA, with lipid panel beforehand      Chirag Hdz MD  Interventional Cardiology  November 8, 2022        Medications   Current Outpatient Medications   Medication     aspirin 81 MG tablet     lisinopril (ZESTRIL) 20 MG tablet     metoprolol succinate ER (TOPROL XL) 25 MG 24 hr tablet     rosuvastatin (CRESTOR) 40 MG tablet     sildenafil (VIAGRA) 100 MG tablet     LANsoprazole (PREVACID) 15 MG DR capsule     No current facility-administered medications for this visit.     Allergies   No Known Allergies      Physical Exam       BP: 124/76 Pulse: 70     SpO2: 98 %      Vital Signs with Ranges  Pulse:  [70] 70  BP: (124-144)/(76-80) 124/76  SpO2:  [98 %] 98 %  210 lbs 0 oz    Constitutional: Well-appearing, no acute distress  Respiratory: Normal respiratory effort, CTAB  Cardiovascular: RRR, faint early peaking systolic murmur at the base.  JVP < 7 cm H2O.  There is no LE edema.   Normal carotid upstrokes, no carotid bruits.  Well-healed surgical scars on both forearms

## 2022-11-08 NOTE — LETTER
11/8/2022    Naima Petty, APRN CNP  [address] [facility] [place] [address]    RE: Kendrick Castellon       Dear Colleague,     I had the pleasure of seeing Kendrick Castellon in the [facility].  CARDIOLOGY CLINIC FOLLOW-UP NOTE      REASON FOR VISIT:   Follow-up CAD, [facility]    PRIMARY CARE PHYSICIAN:  Naima Petty        History of Present Illness   Kendrick Castellon is an extremely pleasant 72 year old male here to [facility].  He had last seen Dr. Blank in 12/2019.  His past medical history is significant for CAD s/p 3v CABG (unknown graft targets; per patient has biradial grafts, no SVG) in [place], [place] in 12/2002, hypertension, dyslipidemia, and GERD.  He is a former smoker.    Since his last visit on 5/2/2022, he has continued to do well from a cardiac standpoint.  At his last visit we discussed some claudication in his left calf when walking that usually occurred after about a mile.  Since he started to focus on doing a warm up on elliptical routine beforehand and walking with walking sticks, he feels that this is much better.  Outside of this, he has no chest pains, shortness of breath, or any other cardiac complaints.    His most recent lipids were from 11/1/2022, showing a total cholesterol of 182, HDL 64, LDL 94, and triglycerides 119.  His most recent full set of labs were from 3/30/2022, showing normal sodium and potassium, creatinine of 1.11 with estimated GFR of 71, ALT of 33, and TSH of 6.49.  His most recent echocardiogram from 6/25/2019 was essentially normal.  His last ischemic evaluation was an exercise MPI from 5/18/2016 showing a very small area of distal anterolateral ischemia, but was otherwise normal.  His most recent ECG from 5/2/2022 shows sinus rhythm with RBBB.    Assessment & Plan     1. Exertional calf pain concerning for symptomatic claudication, improving with exercise   2. CAD s/p 3v CABG (unknown graft targets; per patient has biradial grafts, no SVG) in  [place] in 12/2002, CCS 0 angina  3. Hypertension, well controlled   4. Dyslipidemia, with suboptimal control  5. RBBB  6. Former tobacco abuse      It was a pleasure to meet with Kendrick again in clinic today.  I am glad to hear that his claudication symptoms have improved with conservative exercise therapy.  He does not have any evidence of ulcers, nonhealing wounds, etc. concerning for critical limb ischemia, so we will continue with conservative management for now.  His 1 lingering issue is his dyslipidemia, which is not currently at goal despite maximally dosed statin of Crestor 40 mg daily.  We discussed potential options, including adding ezetimibe, and he is open to this.  We will start with ezetimibe 10 mg daily and then repeat a lipid panel in 2 to 3 months, and decide on further management options if needed based on these results.      - I have encouraged Kendrick to obtain a copy of his bypass op report and we will uploaded to Epic once available  - Continue excellent home exercise regimen  - Patient to work on reducing cholesterol intake in diet  - Continue Crestor 40 mg daily  - Start ezetimibe 10 mg daily  - Continue aspirin 81 mg daily  - Continue lisinopril 20 mg daily and Toprol-XL 12.5 mg daily        Follow-up: 3 months with DYANA, with lipid panel beforehand      Chirag Hdz MD  Interventional Cardiology  November 8, 2022        Medications   Current Outpatient Medications   Medication     aspirin 81 MG tablet     lisinopril (ZESTRIL) 20 MG tablet     metoprolol succinate ER (TOPROL XL) 25 MG 24 hr tablet     rosuvastatin (CRESTOR) 40 MG tablet     sildenafil (VIAGRA) 100 MG tablet     LANsoprazole (PREVACID) 15 MG DR capsule     No current facility-administered medications for this visit.     Allergies   No Known Allergies      Physical Exam       BP: 124/76 Pulse: 70     SpO2: 98 %      Vital Signs with Ranges  Pulse:  [70] 70  BP: (124-144)/(76-80) 124/76  SpO2:  [98 %] 98 %  210 lbs 0  oz    Constitutional: Well-appearing, no acute distress  Respiratory: Normal respiratory effort, CTAB  Cardiovascular: RRR, faint early peaking systolic murmur at the base.  JVP < 7 cm H2O.  There is no LE edema.  Normal carotid upstrokes, no carotid bruits.  Well-healed surgical scars on both forearms      Thank you for allowing me to participate in the care of your patient.      Sincerely,     Chirag Hdz MD     [facility]  cc:   Chirag Hdz MD  [address]  [place] [address]

## 2022-12-22 ENCOUNTER — OFFICE VISIT (OUTPATIENT)
Dept: FAMILY MEDICINE | Facility: CLINIC | Age: 72
End: 2022-12-22
Payer: MEDICARE

## 2022-12-22 VITALS
WEIGHT: 215.3 LBS | SYSTOLIC BLOOD PRESSURE: 114 MMHG | BODY MASS INDEX: 31.89 KG/M2 | RESPIRATION RATE: 14 BRPM | TEMPERATURE: 97.7 F | HEART RATE: 57 BPM | OXYGEN SATURATION: 96 % | HEIGHT: 69 IN | DIASTOLIC BLOOD PRESSURE: 62 MMHG

## 2022-12-22 DIAGNOSIS — R80.9 MICROALBUMINURIA: ICD-10-CM

## 2022-12-22 DIAGNOSIS — N18.2 CKD (CHRONIC KIDNEY DISEASE) STAGE 2, GFR 60-89 ML/MIN: ICD-10-CM

## 2022-12-22 DIAGNOSIS — R79.89 ELEVATED TSH: ICD-10-CM

## 2022-12-22 DIAGNOSIS — I10 ESSENTIAL HYPERTENSION WITH GOAL BLOOD PRESSURE LESS THAN 130/80: Primary | ICD-10-CM

## 2022-12-22 DIAGNOSIS — R94.6 ABNORMAL RESULTS OF THYROID FUNCTION STUDIES: ICD-10-CM

## 2022-12-22 PROCEDURE — 99214 OFFICE O/P EST MOD 30 MIN: CPT | Performed by: NURSE PRACTITIONER

## 2022-12-22 RX ORDER — METOPROLOL SUCCINATE 25 MG/1
TABLET, EXTENDED RELEASE ORAL
Qty: 45 TABLET | Refills: 1 | Status: SHIPPED | OUTPATIENT
Start: 2022-12-22 | End: 2023-07-17

## 2022-12-22 ASSESSMENT — PAIN SCALES - GENERAL: PAINLEVEL: NO PAIN (0)

## 2022-12-22 NOTE — PROGRESS NOTES
"  Assessment & Plan     Essential hypertension with goal blood pressure less than 130/80  Controlled, continue plan.  Weight management discussed.  Continue exercise as able to increase stamina and decrease claudication symptoms.  - metoprolol succinate ER (TOPROL XL) 25 MG 24 hr tablet; Take 1/2 tablet (12.5 mg) by mouth daily    Microalbuminuria  -Updating with next set of labs.    CKD (chronic kidney disease) stage 2, GFR 60-89 ml/min  Blood pressure improved hoping CKD remained stable.  GFR has been over 60 with previous results.  - Albumin Random Urine Quantitative with Creat Ratio; Future    Elevated TSH  Updating with next set of labs.  - TSH with free T4 reflex; Future    Abnormal results of thyroid function studies  -As above.  - TSH with free T4 reflex; Future             BMI:   Estimated body mass index is 32.26 kg/m  as calculated from the following:    Height as of this encounter: 1.74 m (5' 8.5\").    Weight as of this encounter: 97.7 kg (215 lb 4.8 oz).   Weight management plan: Discussed healthy diet and exercise guidelines    MEDICATIONS:  Continue current medications without change    Return in about 4 weeks (around 1/19/2023) for Lab Work, physcial in March.    JOSE Vallejo CNP  [facility] HARSHIL Marks is a 72 year old, presenting for the following health issues:  Hypertension      History of Present Illness       Hypertension: He presents for follow up of hypertension.  He does not check blood pressure  regularly outside of the clinic.  He does not follow a low salt diet.     Reason for visit:  General checkup for purpose of renewing prescriptions    He eats 0-1 servings of fruits and vegetables daily.He consumes 0 sweetened beverage(s) daily.He exercises with enough effort to increase his heart rate 30 to 60 minutes per day.    He is taking medications regularly.     Seeing vascular for intermittent claudication.  Patient states he still does have this and it " "limits his walking to some degree.  He stays active in his yard with a puppy.  He is continuing to work on weight management.    Patient increased his hyperlipidemia treatment and incorporated Zetia.  He would like labs done next month.          Review of Systems   Constitutional, HEENT, cardiovascular, pulmonary, gi and gu systems are negative, except as otherwise noted.      Objective    /62   Pulse 57   Temp 97.7  F (36.5  C) (Temporal)   Resp 14   Ht 1.74 m (5' 8.5\")   Wt 97.7 kg (215 lb 4.8 oz)   SpO2 96%   BMI 32.26 kg/m    Body mass index is 32.26 kg/m .  Physical Exam   GENERAL healthy, alert and no distress  EYES sclera clear, PERRLA  HENT: nose,mouth without ulcers or lesions, Normal ear canals, TM's pearly grey, normal light reflex bilaterally   NECK: no adenopathy, no asymmetry, masses, or scars and thyroid normal to palpation  RESP: Clear to auscultation  CV: RRR, no murmur, no edema  NEURO: NEGATIVE, alert/oriented to person, location and time  PSYCH: normal pleasant affect                      "

## 2023-02-27 ENCOUNTER — LAB (OUTPATIENT)
Dept: LAB | Facility: CLINIC | Age: 73
End: 2023-02-27
Payer: MEDICARE

## 2023-02-27 DIAGNOSIS — I25.10 CORONARY ARTERY DISEASE INVOLVING NATIVE CORONARY ARTERY OF NATIVE HEART WITHOUT ANGINA PECTORIS: ICD-10-CM

## 2023-02-27 LAB
ALT SERPL W P-5'-P-CCNC: 23 U/L (ref 10–50)
CHOLEST SERPL-MCNC: 137 MG/DL
HDLC SERPL-MCNC: 57 MG/DL
LDLC SERPL CALC-MCNC: 51 MG/DL
NONHDLC SERPL-MCNC: 80 MG/DL
TRIGL SERPL-MCNC: 146 MG/DL

## 2023-02-27 PROCEDURE — 36415 COLL VENOUS BLD VENIPUNCTURE: CPT

## 2023-02-27 PROCEDURE — 84460 ALANINE AMINO (ALT) (SGPT): CPT

## 2023-02-27 PROCEDURE — 80061 LIPID PANEL: CPT

## 2023-03-31 ENCOUNTER — OFFICE VISIT (OUTPATIENT)
Dept: CARDIOLOGY | Facility: CLINIC | Age: 73
End: 2023-03-31
Payer: MEDICARE

## 2023-03-31 VITALS
BODY MASS INDEX: 32.63 KG/M2 | HEIGHT: 69 IN | WEIGHT: 220.3 LBS | SYSTOLIC BLOOD PRESSURE: 129 MMHG | DIASTOLIC BLOOD PRESSURE: 78 MMHG | HEART RATE: 76 BPM

## 2023-03-31 DIAGNOSIS — E78.2 MIXED HYPERLIPIDEMIA: Primary | ICD-10-CM

## 2023-03-31 DIAGNOSIS — I25.10 CORONARY ARTERY DISEASE INVOLVING NATIVE CORONARY ARTERY OF NATIVE HEART WITHOUT ANGINA PECTORIS: ICD-10-CM

## 2023-03-31 PROCEDURE — 99214 OFFICE O/P EST MOD 30 MIN: CPT | Performed by: NURSE PRACTITIONER

## 2023-03-31 RX ORDER — EZETIMIBE 10 MG/1
10 TABLET ORAL DAILY
Qty: 90 TABLET | Refills: 3 | Status: CANCELLED | OUTPATIENT
Start: 2023-03-31

## 2023-03-31 RX ORDER — ROSUVASTATIN CALCIUM 40 MG/1
40 TABLET, COATED ORAL DAILY
Qty: 90 TABLET | Refills: 3 | Status: CANCELLED | OUTPATIENT
Start: 2023-03-31

## 2023-03-31 NOTE — PROGRESS NOTES
Cardiology Clinic Progress Note  Kendrick Castellon MRN# [id number]   YOB: 1950 Age: 73 year old     Primary cardiologist: Dr. Hdz     Reason for visit: follow-up on lipid panel    History of presenting illness:    Kendrick Castellon is a pleasant 73 year old patient who follows closely with Dr. Hdz.  He has a past medical history significant for CAD s/p three-vessel CABG (unknown graft targets), hypertension, hyperlipidemia, former tobacco use, and GERD, here today for follow-up.    His cholesterol has been difficult to control over the last year or so.  His atorvastatin was changed to rosuvastatin 40 mg daily in August of 2022.  Despite this change, his LDL remained not at goal. Therefore he was started on ezetimibe 10 mg daily in November 2022.  Subsequent lipid panel on 2/27/2023 was markedly improved with a total cholesterol 137, LDL 51, HDL 57, and triglycerides of 146.    Today he reports doing well from a cardiovascular standpoint.  He has no cardiopulmonary complaints.  He specifically denies any chest pain, shortness of breath, syncope or near syncope, or palpitations.  He reports occasionally elbow joint pain and wonders if it could his statin. He admits to not being very active over the winter months and is not watching his diet which is evident by a slow uptrend in his weight.  His blood pressure appears well controlled.         Assessment and Plan:     ASSESSMENT:    1. CAD s/p 3-v CABG in [place], [place] (2002).  Unknown graft targets, the patient reports by radial grafts, no SVG.  2. Hyperlipidemia with goal LDL < 70. At goal, on rosuvastatin 40 mg daily and zetia 10 mg daily. Reports occasional elbow pain that radiates into forearm. States he had this pain prior to changing to rosuvastatin, but its more prominent now, however it's very tolerable. He also reports leg pain resolved after stopping atorvastatin.   3. Hypertension.  Well-controlled on current regimen.  4. Former tobacco use.  "      PLAN:     1. Continue rosuvastatin 40 mg daily in addition to ezetimibe 10 mg daily.  2. We discussed lifestyle modifications for cardiovascular risk factor reduction, including Mediterranean diet and 150 minutes of exercise a week.  3. If patient becomes intolerable to rosuvastatin, may need to consider the initiation of PCSK9 inhibitor.  4. Follow-up with Dr. Hdz in approximately 6 months, sooner if needed.         Lalita Powell, SAMRA, APRN, CNP  Page: [phone] (8a-5p M-F)    Orders this Visit:  Orders Placed This Encounter   Procedures     Follow-Up with Cardiology     No orders of the defined types were placed in this encounter.    Medications Discontinued During This Encounter   Medication Reason     LANsoprazole (PREVACID) 15 MG DR capsule Therapy completed (No AVS)       Today's clinic visit entailed:  Review of the result(s) of each unique test - labs  Ordering of each unique test  Prescription drug management  35 minutes spent by me on the date of the encounter doing chart review, review of test results, interpretation of tests, patient visit and documentation   Provider  Link to [facility] Help Grid     The level of medical decision making during this visit was of moderate complexity.           Review of Systems:     Review of Systems:  Skin:        Eyes:       ENT:       Respiratory:  Negative    Cardiovascular:  Negative;palpitations;chest pain;dizziness;syncope or near-syncope;cyanosis;lightheadedness;fatigue;edema    Gastroenterology:      Genitourinary:       Musculoskeletal:  Positive for joint pain  Neurologic:       Psychiatric:       Heme/Lymph/Imm:       Endocrine:                 Physical Exam:   Vitals: /78 (BP Location: Left arm, Cuff Size: Adult Large)   Pulse 76   Ht 1.74 m (5' 8.5\")   Wt 99.9 kg (220 lb 4.8 oz)   BMI 33.01 kg/m    Constitutional:  cooperative        Skin:  warm and dry to the touch        Head:  normocephalic        Eyes:  pupils equal and round    "     ENT:  no pallor or cyanosis        Neck:  JVP normal        Chest:  normal breath sounds, clear to auscultation, normal A-P diameter, normal symmetry, normal respiratory excursion, no use of accessory muscles        Cardiac: regular rhythm;normal S1 and S2;no murmurs, gallops or rubs detected                  Abdomen:  abdomen soft        Vascular: pulses full and equal                                      Extremities and Back:  no edema        Neurological:  no gross motor deficits;affect appropriate             Medications:     Current Outpatient Medications   Medication Sig Dispense Refill     aspirin 81 MG tablet Take 1 tablet by mouth daily.  3     ezetimibe (ZETIA) 10 MG tablet Take 1 tablet (10 mg) by mouth daily 90 tablet 3     lisinopril (ZESTRIL) 20 MG tablet Take 1 tablet (20 mg) by mouth daily 90 tablet 3     metoprolol succinate ER (TOPROL XL) 25 MG 24 hr tablet Take 1/2 tablet (12.5 mg) by mouth daily 45 tablet 1     rosuvastatin (CRESTOR) 40 MG tablet Take 1 tablet (40 mg) by mouth daily 90 tablet 3     sildenafil (VIAGRA) 100 MG tablet Take 1/2-1 tablets ( mg) by mouth daily as needed (ED) Take 30 min to 4 hours before intercourse.  Never use with nitroglycerin, terazosin or doxazosin. 36 tablet 1       Family History   Problem Relation Age of Onset     Diabetes Mother      Heart Disease Father 67        MI     Cancer Maternal Grandmother      Asthma No family hx of      C.A.D. No family hx of      Hypertension No family hx of      Cerebrovascular Disease No family hx of      Breast Cancer No family hx of      Cancer - colorectal No family hx of      Prostate Cancer No family hx of      Alcohol/Drug No family hx of      Allergies No family hx of      Alzheimer Disease No family hx of      Anesthesia Reaction No family hx of      Blood Disease No family hx of      Arthritis No family hx of      Cardiovascular No family hx of      Circulatory No family hx of      Congenital Anomalies No  family hx of      Connective Tissue Disorder No family hx of      Depression No family hx of      Eye Disorder No family hx of      Gastrointestinal Disease No family hx of      Genitourinary Problems No family hx of      Genetic Disorder No family hx of      Endocrine Disease No family hx of      Gynecology No family hx of      Lipids No family hx of      Musculoskeletal Disorder No family hx of      Hearing Loss No family hx of      Family History Negative No family hx of      Thyroid Disease No family hx of      Respiratory No family hx of      Psychotic Disorder No family hx of      Osteoporosis No family hx of      Obesity No family hx of      Neurologic Disorder No family hx of      Coronary Artery Disease No family hx of      Hyperlipidemia No family hx of      Ovarian Cancer No family hx of      Other Cancer No family hx of      Mental Illness No family hx of      Known Genetic Syndrome No family hx of      Unknown/Adopted No family hx of      Anxiety Disorder No family hx of      Mental Illness No family hx of      Substance Abuse No family hx of      Colon Cancer No family hx of        Social History     Socioeconomic History     Marital status:      Spouse name: Not on file     Number of children: 2     Years of education: Not on file     Highest education level: Not on file   Occupational History     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[employer]   Tobacco Use     Smoking status: Former     Packs/day: 1.00     Years: 15.00     Pack years: 15.00     Types: Cigarettes     Quit date: 1987     Years since quittin.0     Smokeless tobacco: Never   Vaping Use     Vaping Use: Never used   Substance and Sexual Activity     Alcohol use: Not Currently     Alcohol/week: 11.7 standard drinks     Types: 14 Standard drinks or equivalent per week     Comment: 0-2 perweek      Drug use: No     Sexual activity: Yes     Partners: Female     Comment: no protection   Other Topics Concern     Parent/sibling w/ CABG, MI  or angioplasty before 65F 55M? No      Service No     Blood Transfusions No     Caffeine Concern No     Occupational Exposure No     Hobby Hazards No     Sleep Concern No     Stress Concern No     Weight Concern No     Special Diet No     Back Care No     Exercise No     Bike Helmet Not Asked     Comment: NA     Seat Belt Yes     Self-Exams Yes   Social History Narrative     Not on file     Social Determinants of Health     Financial Resource Strain: Not on file   Food Insecurity: Not on file   Transportation Needs: Not on file   Physical Activity: Not on file   Stress: Not on file   Social Connections: Not on file   Intimate Partner Violence: Not on file   Housing Stability: Not on file            Past Medical History:     Past Medical History:   Diagnosis Date     CKD (chronic kidney disease) stage 2, GFR 60-89 ml/min 11/12/2013     Coronary artery disease involving native coronary artery of native heart without angina pectoris 12/7/2015     Erectile dysfunction, unspecified erectile dysfunction type 11/30/2015     Essential hypertension with goal blood pressure less than 130/80 11/17/2017     GERD (gastroesophageal reflux disease) 4/5/2012     Hyperlipidemia LDL goal <100 4/5/2012     Hypertension goal BP (blood pressure) < 140/90 4/5/2012              Past Surgical History:     Past Surgical History:   Procedure Laterality Date     CARDIAC SURGERY  12/05/2002    CABG x 3: STATON to LAD, left radial to circ & right radial to RCA     COLONOSCOPY  4/2001    reported as normal--10 year follow up     COLONOSCOPY  5/10/2012    Procedure:COLONOSCOPY; screening colonoscopy; Surgeon:REN MAR; Location: OR     COLONOSCOPY N/A 5/4/2022    Procedure: COLONOSCOPY, FLEXIBLE, WITH LESION REMOVAL USING SNARE;  Surgeon: Jamaal Schultz MD;  Location:  GI     VASECTOMY                Allergies:   Patient has no known allergies.       Data:   All laboratory data reviewed:    Recent Labs   Lab Test  02/27/23  0920 11/01/22  0809 08/02/22  0804 03/30/22  0828 01/29/21  0911 11/21/19  0824   LDL 51 94 82 103*   < > 61   HDL 57 64 56 61   < > 57   NHDL 80 118 112 120   < > 92   CHOL 137 182 168 181   < > 149   TRIG 146 119 152* 83   < > 156*   TSH  --   --   --  6.49*  --  1.82    < > = values in this interval not displayed.       Lab Results   Component Value Date    WBC 8.4 11/17/2017    RBC 5.23 11/17/2017    HGB 15.2 01/29/2021    HCT 47.6 11/17/2017    MCV 91 11/17/2017    MCH 30.0 11/17/2017    MCHC 33.0 11/17/2017    RDW 13.5 11/17/2017     11/17/2017       Lab Results   Component Value Date     03/30/2022     03/05/2021    POTASSIUM 5.2 03/30/2022    POTASSIUM 4.7 03/05/2021    CHLORIDE 109 03/30/2022    CHLORIDE 106 03/05/2021    CO2 28 03/30/2022    CO2 26 03/05/2021    ANIONGAP 4 03/30/2022    ANIONGAP 6 03/05/2021     (H) 03/30/2022    GLC 90 03/05/2021    BUN 15 03/30/2022    BUN 17 03/05/2021    CR 1.11 03/30/2022    CR 1.17 03/05/2021    GFRESTIMATED 71 03/30/2022    GFRESTIMATED 63 03/05/2021    GFRESTBLACK 73 03/05/2021    TED 9.6 03/30/2022    TED 9.2 03/05/2021      Lab Results   Component Value Date    AST 24 03/30/2022    AST 19 01/29/2021    ALT 23 02/27/2023    ALT 37 01/29/2021       No results found for: A1C    No results found for: INR

## 2023-03-31 NOTE — PATIENT INSTRUCTIONS
Today's Plan:     1) Follow-up with Dr. Hdz in 6 months, sooner if needed.     If you have questions or concerns please call my nurse team at [phone].       Scheduling phone number: [phone]    Reminder: Please bring in all current medications, over the counter supplements and vitamin bottles to your next appointment.-    It was a pleasure seeing you today!     Lalita Powell, CHAPO  3/31/2023

## 2023-03-31 NOTE — LETTER
3/31/2023    Naima Petty, APRN CNP  [id number] [facility] [id number]    RE: Kendrick Castellon       Dear Colleague,     I had the pleasure of seeing Kendrick Castellon in the [facility].  Cardiology Clinic Progress Note  Kendrick Castellon MRN# [id number]   YOB: 1950 Age: 73 year old     Primary cardiologist: Dr. Hdz     Reason for visit: follow-up on lipid panel    History of presenting illness:    Kendrick Castellon is a pleasant 73 year old patient who follows closely with Dr. Hdz.  He has a past medical history significant for CAD s/p three-vessel CABG (unknown graft targets), hypertension, hyperlipidemia, former tobacco use, and GERD, here today for follow-up.    His cholesterol has been difficult to control over the last year or so.  His atorvastatin was changed to rosuvastatin 40 mg daily in August of 2022.  Despite this change, his LDL remained not at goal. Therefore he was started on ezetimibe 10 mg daily in November 2022.  Subsequent lipid panel on 2/27/2023 was markedly improved with a total cholesterol 137, LDL 51, HDL 57, and triglycerides of 146.    Today he reports doing well from a cardiovascular standpoint.  He has no cardiopulmonary complaints.  He specifically denies any chest pain, shortness of breath, syncope or near syncope, or palpitations.  He reports occasionally elbow joint pain and wonders if it could his statin. He admits to not being very active over the winter months and is not watching his diet which is evident by a slow uptrend in his weight.  His blood pressure appears well controlled.         Assessment and Plan:     ASSESSMENT:    CAD s/p 3-v CABG in [place], [place] (2002).  Unknown graft targets, the patient reports by radial grafts, no SVG.  Hyperlipidemia with goal LDL < 70. At goal, on rosuvastatin 40 mg daily and zetia 10 mg daily. Reports occasional elbow pain that radiates into forearm. States he had this pain prior to changing to rosuvastatin,  "but its more prominent now, however it's very tolerable. He also reports leg pain resolved after stopping atorvastatin.   Hypertension.  Well-controlled on current regimen.  Former tobacco use.       PLAN:     Continue rosuvastatin 40 mg daily in addition to ezetimibe 10 mg daily.  We discussed lifestyle modifications for cardiovascular risk factor reduction, including Mediterranean diet and 150 minutes of exercise a week.  If patient becomes intolerable to rosuvastatin, may need to consider the initiation of PCSK9 inhibitor.  Follow-up with Dr. Hdz in approximately 6 months, sooner if needed.         Lalita Powell, DNP, APRN, CNP  Page: [phone] (8a-5p M-F)    Orders this Visit:  Orders Placed This Encounter   Procedures    Follow-Up with Cardiology     No orders of the defined types were placed in this encounter.    Medications Discontinued During This Encounter   Medication Reason    LANsoprazole (PREVACID) 15 MG DR capsule Therapy completed (No AVS)       Today's clinic visit entailed:  Review of the result(s) of each unique test - labs  Ordering of each unique test  Prescription drug management  35 minutes spent by me on the date of the encounter doing chart review, review of test results, interpretation of tests, patient visit and documentation   Provider  Link to [facility] Help Grid     The level of medical decision making during this visit was of moderate complexity.           Review of Systems:     Review of Systems:  Skin:        Eyes:       ENT:       Respiratory:  Negative    Cardiovascular:  Negative;palpitations;chest pain;dizziness;syncope or near-syncope;cyanosis;lightheadedness;fatigue;edema    Gastroenterology:      Genitourinary:       Musculoskeletal:  Positive for joint pain  Neurologic:       Psychiatric:       Heme/Lymph/Imm:       Endocrine:                 Physical Exam:   Vitals: /78 (BP Location: Left arm, Cuff Size: Adult Large)   Pulse 76   Ht 1.74 m (5' 8.5\")   Wt 99.9 kg " (220 lb 4.8 oz)   BMI 33.01 kg/m    Constitutional:  cooperative        Skin:  warm and dry to the touch        Head:  normocephalic        Eyes:  pupils equal and round        ENT:  no pallor or cyanosis        Neck:  JVP normal        Chest:  normal breath sounds, clear to auscultation, normal A-P diameter, normal symmetry, normal respiratory excursion, no use of accessory muscles        Cardiac: regular rhythm;normal S1 and S2;no murmurs, gallops or rubs detected                  Abdomen:  abdomen soft        Vascular: pulses full and equal                                      Extremities and Back:  no edema        Neurological:  no gross motor deficits;affect appropriate             Medications:     Current Outpatient Medications   Medication Sig Dispense Refill    aspirin 81 MG tablet Take 1 tablet by mouth daily.  3    ezetimibe (ZETIA) 10 MG tablet Take 1 tablet (10 mg) by mouth daily 90 tablet 3    lisinopril (ZESTRIL) 20 MG tablet Take 1 tablet (20 mg) by mouth daily 90 tablet 3    metoprolol succinate ER (TOPROL XL) 25 MG 24 hr tablet Take 1/2 tablet (12.5 mg) by mouth daily 45 tablet 1    rosuvastatin (CRESTOR) 40 MG tablet Take 1 tablet (40 mg) by mouth daily 90 tablet 3    sildenafil (VIAGRA) 100 MG tablet Take 1/2-1 tablets ( mg) by mouth daily as needed (ED) Take 30 min to 4 hours before intercourse.  Never use with nitroglycerin, terazosin or doxazosin. 36 tablet 1       Family History   Problem Relation Age of Onset    Diabetes Mother     Heart Disease Father 67        MI    Cancer Maternal Grandmother     Asthma No family hx of     C.A.D. No family hx of     Hypertension No family hx of     Cerebrovascular Disease No family hx of     Breast Cancer No family hx of     Cancer - colorectal No family hx of     Prostate Cancer No family hx of     Alcohol/Drug No family hx of     Allergies No family hx of     Alzheimer Disease No family hx of     Anesthesia Reaction No family hx of     Blood  Disease No family hx of     Arthritis No family hx of     Cardiovascular No family hx of     Circulatory No family hx of     Congenital Anomalies No family hx of     Connective Tissue Disorder No family hx of     Depression No family hx of     Eye Disorder No family hx of     Gastrointestinal Disease No family hx of     Genitourinary Problems No family hx of     Genetic Disorder No family hx of     Endocrine Disease No family hx of     Gynecology No family hx of     Lipids No family hx of     Musculoskeletal Disorder No family hx of     Hearing Loss No family hx of     Family History Negative No family hx of     Thyroid Disease No family hx of     Respiratory No family hx of     Psychotic Disorder No family hx of     Osteoporosis No family hx of     Obesity No family hx of     Neurologic Disorder No family hx of     Coronary Artery Disease No family hx of     Hyperlipidemia No family hx of     Ovarian Cancer No family hx of     Other Cancer No family hx of     Mental Illness No family hx of     Known Genetic Syndrome No family hx of     Unknown/Adopted No family hx of     Anxiety Disorder No family hx of     Mental Illness No family hx of     Substance Abuse No family hx of     Colon Cancer No family hx of        Social History     Socioeconomic History    Marital status:      Spouse name: Not on file    Number of children: 2    Years of education: Not on file    Highest education level: Not on file   Occupational History     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[employer]   Tobacco Use    Smoking status: Former     Packs/day: 1.00     Years: 15.00     Pack years: 15.00     Types: Cigarettes     Quit date: 1987     Years since quittin.0    Smokeless tobacco: Never   Vaping Use    Vaping Use: Never used   Substance and Sexual Activity    Alcohol use: Not Currently     Alcohol/week: 11.7 standard drinks     Types: 14 Standard drinks or equivalent per week     Comment: 0-2 perweek     Drug use: No    Sexual  activity: Yes     Partners: Female     Comment: no protection   Other Topics Concern    Parent/sibling w/ CABG, MI or angioplasty before 65F 55M? No     Service No    Blood Transfusions No    Caffeine Concern No    Occupational Exposure No    Hobby Hazards No    Sleep Concern No    Stress Concern No    Weight Concern No    Special Diet No    Back Care No    Exercise No    Bike Helmet Not Asked     Comment: NA    Seat Belt Yes    Self-Exams Yes   Social History Narrative    Not on file     Social Determinants of Health     Financial Resource Strain: Not on file   Food Insecurity: Not on file   Transportation Needs: Not on file   Physical Activity: Not on file   Stress: Not on file   Social Connections: Not on file   Intimate Partner Violence: Not on file   Housing Stability: Not on file            Past Medical History:     Past Medical History:   Diagnosis Date    CKD (chronic kidney disease) stage 2, GFR 60-89 ml/min 11/12/2013    Coronary artery disease involving native coronary artery of native heart without angina pectoris 12/7/2015    Erectile dysfunction, unspecified erectile dysfunction type 11/30/2015    Essential hypertension with goal blood pressure less than 130/80 11/17/2017    GERD (gastroesophageal reflux disease) 4/5/2012    Hyperlipidemia LDL goal <100 4/5/2012    Hypertension goal BP (blood pressure) < 140/90 4/5/2012              Past Surgical History:     Past Surgical History:   Procedure Laterality Date    CARDIAC SURGERY  12/05/2002    CABG x 3: STATON to LAD, left radial to circ & right radial to RCA    COLONOSCOPY  4/2001    reported as normal--10 year follow up    COLONOSCOPY  5/10/2012    Procedure:COLONOSCOPY; screening colonoscopy; Surgeon:REN MAR; Location: OR    COLONOSCOPY N/A 5/4/2022    Procedure: COLONOSCOPY, FLEXIBLE, WITH LESION REMOVAL USING SNARE;  Surgeon: Jamaal Schultz MD;  Location:  GI    VASECTOMY                Allergies:   Patient has no  known allergies.       Data:   All laboratory data reviewed:    Recent Labs   Lab Test 02/27/23  0920 11/01/22  0809 08/02/22  0804 03/30/22  0828 01/29/21  0911 11/21/19  0824   LDL 51 94 82 103*   < > 61   HDL 57 64 56 61   < > 57   NHDL 80 118 112 120   < > 92   CHOL 137 182 168 181   < > 149   TRIG 146 119 152* 83   < > 156*   TSH  --   --   --  6.49*  --  1.82    < > = values in this interval not displayed.       Lab Results   Component Value Date    WBC 8.4 11/17/2017    RBC 5.23 11/17/2017    HGB 15.2 01/29/2021    HCT 47.6 11/17/2017    MCV 91 11/17/2017    MCH 30.0 11/17/2017    MCHC 33.0 11/17/2017    RDW 13.5 11/17/2017     11/17/2017       Lab Results   Component Value Date     03/30/2022     03/05/2021    POTASSIUM 5.2 03/30/2022    POTASSIUM 4.7 03/05/2021    CHLORIDE 109 03/30/2022    CHLORIDE 106 03/05/2021    CO2 28 03/30/2022    CO2 26 03/05/2021    ANIONGAP 4 03/30/2022    ANIONGAP 6 03/05/2021     (H) 03/30/2022    GLC 90 03/05/2021    BUN 15 03/30/2022    BUN 17 03/05/2021    CR 1.11 03/30/2022    CR 1.17 03/05/2021    GFRESTIMATED 71 03/30/2022    GFRESTIMATED 63 03/05/2021    GFRESTBLACK 73 03/05/2021    TED 9.6 03/30/2022    TED 9.2 03/05/2021      Lab Results   Component Value Date    AST 24 03/30/2022    AST 19 01/29/2021    ALT 23 02/27/2023    ALT 37 01/29/2021       No results found for: A1C    No results found for: INR        Thank you for allowing me to participate in the care of your patient.      Sincerely,     Lalita Powell, [facility]  cc:   Chirag Hdz MD  [id number] [id number]

## 2023-04-20 ENCOUNTER — PATIENT OUTREACH (OUTPATIENT)
Dept: CARE COORDINATION | Facility: CLINIC | Age: 73
End: 2023-04-20
Payer: MEDICARE

## 2023-04-22 DIAGNOSIS — I10 ESSENTIAL HYPERTENSION WITH GOAL BLOOD PRESSURE LESS THAN 130/80: ICD-10-CM

## 2023-04-22 DIAGNOSIS — N18.2 CKD (CHRONIC KIDNEY DISEASE) STAGE 2, GFR 60-89 ML/MIN: ICD-10-CM

## 2023-04-25 ENCOUNTER — MYC MEDICAL ADVICE (OUTPATIENT)
Dept: FAMILY MEDICINE | Facility: CLINIC | Age: 73
End: 2023-04-25
Payer: MEDICARE

## 2023-04-25 RX ORDER — LISINOPRIL 20 MG/1
TABLET ORAL
Qty: 90 TABLET | Refills: 0 | Status: SHIPPED | OUTPATIENT
Start: 2023-04-25 | End: 2023-05-16

## 2023-04-25 NOTE — TELEPHONE ENCOUNTER
"Pending Prescriptions:                       Disp   Refills    lisinopril (ZESTRIL) 20 MG tablet [Pharmac*90 tab*0        Sig: TAKE 1 TABLET (20 MG) BY MOUTH ONCE DAILY    Routing refill request to provider for review/approval because:  Labs not current:  Creatinine, Potassium   Patient needs to be seen because it has been more than 1 year since last office visit.  Requested Prescriptions   Pending Prescriptions Disp Refills    lisinopril (ZESTRIL) 20 MG tablet [Pharmacy Med Name: Lisinopril Oral Tablet 20 MG] 90 tablet 0     Sig: TAKE 1 TABLET (20 MG) BY MOUTH ONCE DAILY       ACE Inhibitors (Including Combos) Protocol Failed - 4/22/2023  2:00 AM        Failed - Normal serum creatinine on file in past 12 months     Recent Labs   Lab Test 03/30/22  0828   CR 1.11       Ok to refill medication if creatinine is low          Failed - Normal serum potassium on file in past 12 months     Recent Labs   Lab Test 03/30/22  0828   POTASSIUM 5.2               Passed - Blood pressure under 140/90 in past 12 months     BP Readings from Last 3 Encounters:   03/31/23 129/78   12/22/22 114/62   11/08/22 124/76                 Passed - Recent (12 mo) or future (30 days) visit within the authorizing provider's specialty     Patient has had an office visit with the authorizing provider or a provider within the authorizing providers department within the previous 12 mos or has a future within next 30 days. See \"Patient Info\" tab in inbasket, or \"Choose Columns\" in Meds & Orders section of the refill encounter.              Passed - Medication is active on med list        Passed - Patient is age 18 or older                   "

## 2023-05-15 DIAGNOSIS — I10 ESSENTIAL HYPERTENSION WITH GOAL BLOOD PRESSURE LESS THAN 130/80: ICD-10-CM

## 2023-05-15 DIAGNOSIS — N18.2 CKD (CHRONIC KIDNEY DISEASE) STAGE 2, GFR 60-89 ML/MIN: ICD-10-CM

## 2023-05-16 RX ORDER — LISINOPRIL 20 MG/1
TABLET ORAL
Qty: 90 TABLET | Refills: 0 | Status: SHIPPED | OUTPATIENT
Start: 2023-05-16 | End: 2023-07-17

## 2023-05-16 NOTE — TELEPHONE ENCOUNTER
"Pending Prescriptions:                       Disp   Refills    lisinopril (ZESTRIL) 20 MG tablet [Pharmac*90 tab*0        Sig: TAKE 1 TABLET BY MOUTH ONCE DAILY    Routing refill request to provider for review/approval because:  Labs not current:      PHQ-9 score:         View : No data to display.              Next 5 appointments (look out 90 days)      Jul 17, 2023 11:30 AM  (Arrive by 11:10 AM)  Welcome to Medicare Visit with JOSE Joel CNP  [facility] ([facility] [place] ) [address], [address]  [place] [id number] [phone]            Requested Prescriptions   Pending Prescriptions Disp Refills    lisinopril (ZESTRIL) 20 MG tablet [Pharmacy Med Name: Lisinopril Oral Tablet 20 MG] 90 tablet 0     Sig: TAKE 1 TABLET BY MOUTH ONCE DAILY       ACE Inhibitors (Including Combos) Protocol Failed - 5/15/2023 10:51 AM        Failed - Normal serum creatinine on file in past 12 months     Recent Labs   Lab Test 03/30/22  0828   CR 1.11       Ok to refill medication if creatinine is low          Failed - Normal serum potassium on file in past 12 months     Recent Labs   Lab Test 03/30/22  0828   POTASSIUM 5.2               Passed - Blood pressure under 140/90 in past 12 months     BP Readings from Last 3 Encounters:   03/31/23 129/78   12/22/22 114/62   11/08/22 124/76                 Passed - Recent (12 mo) or future (30 days) visit within the authorizing provider's specialty     Patient has had an office visit with the authorizing provider or a provider within the authorizing providers department within the previous 12 mos or has a future within next 30 days. See \"Patient Info\" tab in inbasket, or \"Choose Columns\" in Meds & Orders section of the refill encounter.              Passed - Medication is active on med list        Passed - Patient is age 18 or older                   "

## 2023-05-17 DIAGNOSIS — I25.10 CORONARY ARTERY DISEASE INVOLVING NATIVE CORONARY ARTERY OF NATIVE HEART WITHOUT ANGINA PECTORIS: ICD-10-CM

## 2023-05-17 DIAGNOSIS — E78.2 MIXED HYPERLIPIDEMIA: ICD-10-CM

## 2023-05-17 RX ORDER — ROSUVASTATIN CALCIUM 40 MG/1
40 TABLET, COATED ORAL DAILY
Qty: 90 TABLET | Refills: 4 | Status: SHIPPED | OUTPATIENT
Start: 2023-05-17 | End: 2023-07-17

## 2023-06-03 ENCOUNTER — HEALTH MAINTENANCE LETTER (OUTPATIENT)
Age: 73
End: 2023-06-03

## 2023-07-17 ENCOUNTER — OFFICE VISIT (OUTPATIENT)
Dept: FAMILY MEDICINE | Facility: CLINIC | Age: 73
End: 2023-07-17
Payer: MEDICARE

## 2023-07-17 VITALS
TEMPERATURE: 97.5 F | DIASTOLIC BLOOD PRESSURE: 64 MMHG | WEIGHT: 221 LBS | HEIGHT: 69 IN | HEART RATE: 55 BPM | BODY MASS INDEX: 32.73 KG/M2 | SYSTOLIC BLOOD PRESSURE: 120 MMHG | OXYGEN SATURATION: 97 %

## 2023-07-17 DIAGNOSIS — E78.2 MIXED HYPERLIPIDEMIA: ICD-10-CM

## 2023-07-17 DIAGNOSIS — J01.00 ACUTE NON-RECURRENT MAXILLARY SINUSITIS: ICD-10-CM

## 2023-07-17 DIAGNOSIS — N18.2 CKD (CHRONIC KIDNEY DISEASE) STAGE 2, GFR 60-89 ML/MIN: ICD-10-CM

## 2023-07-17 DIAGNOSIS — I73.9 CLAUDICATION (H): ICD-10-CM

## 2023-07-17 DIAGNOSIS — Z00.00 MEDICARE ANNUAL WELLNESS VISIT, SUBSEQUENT: Primary | ICD-10-CM

## 2023-07-17 DIAGNOSIS — I10 ESSENTIAL HYPERTENSION WITH GOAL BLOOD PRESSURE LESS THAN 130/80: ICD-10-CM

## 2023-07-17 DIAGNOSIS — I25.10 CORONARY ARTERY DISEASE INVOLVING NATIVE CORONARY ARTERY OF NATIVE HEART WITHOUT ANGINA PECTORIS: ICD-10-CM

## 2023-07-17 DIAGNOSIS — N52.9 ERECTILE DYSFUNCTION, UNSPECIFIED ERECTILE DYSFUNCTION TYPE: ICD-10-CM

## 2023-07-17 LAB
BASOPHILS # BLD AUTO: 0 10E3/UL (ref 0–0.2)
BASOPHILS NFR BLD AUTO: 0 %
EOSINOPHIL # BLD AUTO: 0.1 10E3/UL (ref 0–0.7)
EOSINOPHIL NFR BLD AUTO: 1 %
ERYTHROCYTE [DISTWIDTH] IN BLOOD BY AUTOMATED COUNT: 12.8 % (ref 10–15)
HBA1C MFR BLD: 5.6 % (ref 0–5.6)
HCT VFR BLD AUTO: 48 % (ref 40–53)
HGB BLD-MCNC: 15.8 G/DL (ref 13.3–17.7)
IMM GRANULOCYTES # BLD: 0 10E3/UL
IMM GRANULOCYTES NFR BLD: 0 %
LYMPHOCYTES # BLD AUTO: 2.5 10E3/UL (ref 0.8–5.3)
LYMPHOCYTES NFR BLD AUTO: 21 %
MCH RBC QN AUTO: 29.1 PG (ref 26.5–33)
MCHC RBC AUTO-ENTMCNC: 32.9 G/DL (ref 31.5–36.5)
MCV RBC AUTO: 88 FL (ref 78–100)
MONOCYTES # BLD AUTO: 0.9 10E3/UL (ref 0–1.3)
MONOCYTES NFR BLD AUTO: 7 %
NEUTROPHILS # BLD AUTO: 8.6 10E3/UL (ref 1.6–8.3)
NEUTROPHILS NFR BLD AUTO: 71 %
PLATELET # BLD AUTO: 219 10E3/UL (ref 150–450)
RBC # BLD AUTO: 5.43 10E6/UL (ref 4.4–5.9)
WBC # BLD AUTO: 12.1 10E3/UL (ref 4–11)

## 2023-07-17 PROCEDURE — 83036 HEMOGLOBIN GLYCOSYLATED A1C: CPT | Performed by: NURSE PRACTITIONER

## 2023-07-17 PROCEDURE — 85025 COMPLETE CBC W/AUTO DIFF WBC: CPT | Performed by: NURSE PRACTITIONER

## 2023-07-17 PROCEDURE — 80053 COMPREHEN METABOLIC PANEL: CPT | Performed by: NURSE PRACTITIONER

## 2023-07-17 PROCEDURE — 82570 ASSAY OF URINE CREATININE: CPT | Performed by: NURSE PRACTITIONER

## 2023-07-17 PROCEDURE — 84439 ASSAY OF FREE THYROXINE: CPT | Performed by: NURSE PRACTITIONER

## 2023-07-17 PROCEDURE — 84443 ASSAY THYROID STIM HORMONE: CPT | Performed by: NURSE PRACTITIONER

## 2023-07-17 PROCEDURE — 82043 UR ALBUMIN QUANTITATIVE: CPT | Performed by: NURSE PRACTITIONER

## 2023-07-17 PROCEDURE — 0134A COVID-19 BIVALENT 18+ (MODERNA): CPT | Performed by: NURSE PRACTITIONER

## 2023-07-17 PROCEDURE — G0439 PPPS, SUBSEQ VISIT: HCPCS | Performed by: NURSE PRACTITIONER

## 2023-07-17 PROCEDURE — 91313 COVID-19 BIVALENT 18+ (MODERNA): CPT | Performed by: NURSE PRACTITIONER

## 2023-07-17 PROCEDURE — 99214 OFFICE O/P EST MOD 30 MIN: CPT | Mod: 25 | Performed by: NURSE PRACTITIONER

## 2023-07-17 PROCEDURE — 36415 COLL VENOUS BLD VENIPUNCTURE: CPT | Performed by: NURSE PRACTITIONER

## 2023-07-17 RX ORDER — EZETIMIBE 10 MG/1
10 TABLET ORAL DAILY
Qty: 90 TABLET | Refills: 3 | Status: SHIPPED | OUTPATIENT
Start: 2023-07-17 | End: 2024-07-31

## 2023-07-17 RX ORDER — SILDENAFIL 100 MG/1
TABLET, FILM COATED ORAL
Qty: 36 TABLET | Refills: 1 | Status: SHIPPED | OUTPATIENT
Start: 2023-07-17

## 2023-07-17 RX ORDER — LISINOPRIL 20 MG/1
20 TABLET ORAL DAILY
Qty: 90 TABLET | Refills: 3 | Status: SHIPPED | OUTPATIENT
Start: 2023-07-17 | End: 2024-07-31

## 2023-07-17 RX ORDER — ROSUVASTATIN CALCIUM 40 MG/1
40 TABLET, COATED ORAL DAILY
Qty: 90 TABLET | Refills: 4 | Status: SHIPPED | OUTPATIENT
Start: 2023-07-17 | End: 2024-08-14

## 2023-07-17 RX ORDER — AZITHROMYCIN 250 MG/1
TABLET, FILM COATED ORAL
Qty: 6 TABLET | Refills: 0 | Status: SHIPPED | OUTPATIENT
Start: 2023-07-17 | End: 2024-09-05

## 2023-07-17 RX ORDER — METOPROLOL SUCCINATE 25 MG/1
TABLET, EXTENDED RELEASE ORAL
Qty: 45 TABLET | Refills: 3 | Status: SHIPPED | OUTPATIENT
Start: 2023-07-17 | End: 2024-08-12

## 2023-07-17 ASSESSMENT — ENCOUNTER SYMPTOMS
EYE PAIN: 1
HEADACHES: 1
JOINT SWELLING: 0
WEAKNESS: 0
ARTHRALGIAS: 0
CHILLS: 0
FEVER: 0
HEMATOCHEZIA: 0
FREQUENCY: 0
DIARRHEA: 0
NERVOUS/ANXIOUS: 0
HEMATURIA: 0
SHORTNESS OF BREATH: 0
ABDOMINAL PAIN: 0
COUGH: 0
NAUSEA: 0
HEARTBURN: 0
CONSTIPATION: 0
DYSURIA: 0
PARESTHESIAS: 0
PALPITATIONS: 0
MYALGIAS: 1
SORE THROAT: 0

## 2023-07-17 ASSESSMENT — ACTIVITIES OF DAILY LIVING (ADL): CURRENT_FUNCTION: NO ASSISTANCE NEEDED

## 2023-07-17 NOTE — PROGRESS NOTES
"SUBJECTIVE:   Kendrick is a 73 year old who presents for Preventive Visit.      7/17/2023    11:21 AM   Additional Questions   Roomed by Silke MOTA CMA   Accompanied by self         7/17/2023    11:21 AM   Patient Reported Additional Medications   Patient reports taking the following new medications n/a     Are you in the first 12 months of your Medicare coverage?  No    Healthy Habits:     In general, how would you rate your overall health?  Good    Frequency of exercise:  4-5 days/week    Duration of exercise:  45-60 minutes    Do you usually eat at least 4 servings of fruit and vegetables a day, include whole grains    & fiber and avoid regularly eating high fat or \"junk\" foods?  No    Taking medications regularly:  Yes    Barriers to taking medications:  None    Medication side effects:  Muscle aches    Ability to successfully perform activities of daily living:  No assistance needed    Home Safety:  Throw rugs in the hallway and lack of grab bars in the bathroom    Hearing Impairment:  No hearing concerns    In the past 6 months, have you been bothered by leaking of urine?  No    In general, how would you rate your overall mental or emotional health?  Good    Additional concerns today:  No      Followed by cardiology- s/p CABG- no CP/SOB  Has stable calf pain- generally improves with exercise. Stable.   + nasal congestion and mild frontal HA.     Have you ever done Advance Care Planning? (For example, a Health Directive, POLST, or a discussion with a medical provider or your loved ones about your wishes): No, advance care planning information given to patient to review.  Patient declined advance care planning discussion at this time.       Fall risk  Fallen 2 or more times in the past year?: No  Any fall with injury in the past year?: No  click delete button to remove this line now  Cognitive Screening   1) Repeat 3 items (Leader, Season, Table)    2) Clock draw: NORMAL  3) 3 item recall: Recalls 3 " objects  Results: 3 items recalled: COGNITIVE IMPAIRMENT LESS LIKELY    Mini-CogTM Copyright [employer]. Licensed by the author for use in [facility]; reprinted with permission (ede@.[facility]). All rights reserved.      Do you have sleep apnea, excessive snoring or daytime drowsiness?: no    Reviewed and updated as needed this visit by clinical staff   Tobacco  Allergies  Meds              Reviewed and updated as needed this visit by Provider                 Social History     Tobacco Use     Smoking status: Former     Packs/day: 1.00     Years: 15.00     Pack years: 15.00     Types: Cigarettes     Quit date: 1987     Years since quittin.3     Smokeless tobacco: Never   Substance Use Topics     Alcohol use: Not Currently     Alcohol/week: 11.7 standard drinks of alcohol     Types: 14 Standard drinks or equivalent per week     Comment: 0-2 perweek              2023    10:41 AM   Alcohol Use   Prescreen: >3 drinks/day or >7 drinks/week? No     Do you have a current opioid prescription? No  Do you use any other controlled substances or medications that are not prescribed by a provider? None              Current providers sharing in care for this patient include:   Patient Care Team:  Naima Petty APRN CNP as PCP - General (Family Practice)  Naima Petty APRN CNP as Assigned PCP  Chirag Hdz MD as Assigned Heart and Vascular Provider    The following health maintenance items are reviewed in Epic and correct as of today:  Health Maintenance   Topic Date Due     ZOSTER IMMUNIZATION (3 of 3) 2021     COVID-19 Vaccine (6 - Moderna series) 2023     MEDICARE ANNUAL WELLNESS VISIT  2023     BMP  2023     CMP  2023     MICROALBUMIN  2023     ANNUAL REVIEW OF HM ORDERS  2023     INFLUENZA VACCINE (1) 2023     LIPID  2024     FALL RISK ASSESSMENT  2024     DTAP/TDAP/TD IMMUNIZATION (3 - Td or Tdap) 2026     ADVANCE CARE  "PLANNING  07/17/2028     COLORECTAL CANCER SCREENING  05/04/2029     PHQ-2 (once per calendar year)  Completed     Pneumococcal Vaccine: 65+ Years  Completed     URINALYSIS  Completed     AORTIC ANEURYSM SCREENING (SYSTEM ASSIGNED)  Completed     HEPATITIS C SCREENING  Addressed     IPV IMMUNIZATION  Aged Out     MENINGITIS IMMUNIZATION  Aged Out     Lab work is in process            Review of Systems   Constitutional: Negative for chills and fever.   HENT: Negative for congestion, ear pain, hearing loss and sore throat.    Eyes: Positive for pain. Negative for visual disturbance.   Respiratory: Negative for cough and shortness of breath.    Cardiovascular: Negative for chest pain, palpitations and peripheral edema.   Gastrointestinal: Negative for abdominal pain, constipation, diarrhea, heartburn, hematochezia and nausea.   Genitourinary: Positive for impotence. Negative for dysuria, frequency, genital sores, hematuria, penile discharge and urgency.   Musculoskeletal: Positive for myalgias. Negative for arthralgias and joint swelling.   Skin: Negative for rash.   Neurological: Positive for headaches. Negative for weakness and paresthesias.   Psychiatric/Behavioral: Negative for mood changes. The patient is not nervous/anxious.      Eye pain not discussed.   OBJECTIVE:   /64   Pulse 55   Temp 97.5  F (36.4  C) (Temporal)   Ht 1.74 m (5' 8.5\")   Wt 100.2 kg (221 lb)   SpO2 97%   BMI 33.11 kg/m   Estimated body mass index is 33.11 kg/m  as calculated from the following:    Height as of this encounter: 1.74 m (5' 8.5\").    Weight as of this encounter: 100.2 kg (221 lb).  Physical Exam  GENERAL: healthy, alert and no distress  EYES: Eyes grossly normal to inspection, PERRL and conjunctivae and sclerae normal  HENT: ear canals and TM's normal, nose and mouth without ulcers or lesions, + nasal congestion.   NECK: no adenopathy, no asymmetry, masses, or scars and thyroid normal to palpation  RESP: lungs clear " to auscultation - no rales, rhonchi or wheezes  CV: regular rate and rhythm, normal S1 S2, no S3 or S4, no murmur, click or rub, no peripheral edema and peripheral pulses strong  ABDOMEN: soft, nontender, no hepatosplenomegaly, no masses and bowel sounds normal  MS: no gross musculoskeletal defects noted, no edema  SKIN: no suspicious lesions or rashes  NEURO: Normal strength and tone, mentation intact and speech normal  PSYCH: mentation appears normal, affect normal/bright    Diagnostic Test Results:  Labs reviewed in Epic  Results for orders placed or performed in visit on 07/17/23 (from the past 24 hour(s))   Hemoglobin A1c   Result Value Ref Range    Hemoglobin A1C 5.6 0.0 - 5.6 %   CBC with Platelets & Differential    Narrative    The following orders were created for panel order CBC with Platelets & Differential.  Procedure                               Abnormality         Status                     ---------                               -----------         ------                     CBC with platelets and d...[[id number]]  Abnormal            Final result                 Please view results for these tests on the individual orders.   CBC with platelets and differential   Result Value Ref Range    WBC Count 12.1 (H) 4.0 - 11.0 10e3/uL    RBC Count 5.43 4.40 - 5.90 10e6/uL    Hemoglobin 15.8 13.3 - 17.7 g/dL    Hematocrit 48.0 40.0 - 53.0 %    MCV 88 78 - 100 fL    MCH 29.1 26.5 - 33.0 pg    MCHC 32.9 31.5 - 36.5 g/dL    RDW 12.8 10.0 - 15.0 %    Platelet Count 219 150 - 450 10e3/uL    % Neutrophils 71 %    % Lymphocytes 21 %    % Monocytes 7 %    % Eosinophils 1 %    % Basophils 0 %    % Immature Granulocytes 0 %    Absolute Neutrophils 8.6 (H) 1.6 - 8.3 10e3/uL    Absolute Lymphocytes 2.5 0.8 - 5.3 10e3/uL    Absolute Monocytes 0.9 0.0 - 1.3 10e3/uL    Absolute Eosinophils 0.1 0.0 - 0.7 10e3/uL    Absolute Basophils 0.0 0.0 - 0.2 10e3/uL    Absolute Immature Granulocytes 0.0 <=0.4 10e3/uL       ASSESSMENT /  "PLAN:       ICD-10-CM    1. Medicare annual wellness visit, subsequent  Z00.00 CBC with Platelets & Differential     Hemoglobin A1c     TSH with free T4 reflex     TSH with free T4 reflex     Hemoglobin A1c     CBC with Platelets & Differential      2. CKD (chronic kidney disease) stage 2, GFR 60-89 ml/min  N18.2 COMPREHENSIVE METABOLIC PANEL     Albumin Random Urine Quantitative with Creat Ratio     lisinopril (ZESTRIL) 20 MG tablet     Albumin Random Urine Quantitative with Creat Ratio     COMPREHENSIVE METABOLIC PANEL      3. Essential hypertension with goal blood pressure less than 130/80  I10 lisinopril (ZESTRIL) 20 MG tablet     metoprolol succinate ER (TOPROL XL) 25 MG 24 hr tablet      4. Coronary artery disease involving native coronary artery of native heart without angina pectoris  I25.10 ezetimibe (ZETIA) 10 MG tablet     rosuvastatin (CRESTOR) 40 MG tablet      5. Mixed hyperlipidemia  E78.2 rosuvastatin (CRESTOR) 40 MG tablet      6. Erectile dysfunction, unspecified erectile dysfunction type  N52.9 sildenafil (VIAGRA) 100 MG tablet      7. Claudication (H)  I73.9       8. Acute non-recurrent maxillary sinusitis  J01.00 azithromycin (ZITHROMAX) 250 MG tablet          Patient has been advised of split billing requirements and indicates understanding: Yes      COUNSELING:  Reviewed preventive health counseling, as reflected in patient instructions       Regular exercise       Healthy diet/nutrition       Immunizations    Vaccinated for: Covid-19             Colon cancer screening       CAD- on statin, followed by cardiology as well. Labs updated meds refilled.   HTN- stable, no changes. Continue plan. Monitoring CKD. Meds refilled.   ED- meds refills.   Claudication- monitor for worsening symptoms.   Treating sinusitis, Zpack ordered      BMI:   Estimated body mass index is 33.11 kg/m  as calculated from the following:    Height as of this encounter: 1.74 m (5' 8.5\").    Weight as of this encounter: " 100.2 kg (221 lb).   Weight management plan: Discussed healthy diet and exercise guidelines weight loss advised.       He reports that he quit smoking about 36 years ago. His smoking use included cigarettes. He has a 15.00 pack-year smoking history. He has never used smokeless tobacco.      Appropriate preventive services were discussed with this patient, including applicable screening as appropriate for cardiovascular disease, diabetes, osteopenia/osteoporosis, and glaucoma.  As appropriate for age/gender, discussed screening for colorectal cancer, prostate cancer, breast cancer, and cervical cancer. Checklist reviewing preventive services available has been given to the patient.    Reviewed patients plan of care and provided an AVS. The Intermediate Care Plan ( asthma action plan, low back pain action plan, and migraine action plan) for Kendrick meets the Care Plan requirement. This Care Plan has been established and reviewed with the Patient.      [facility]    Identified Health Risks:    I have reviewed Opioid Use Disorder and Substance Use Disorder risk factors and made any needed referrals.

## 2023-07-18 LAB
ALBUMIN SERPL BCG-MCNC: 4.7 G/DL (ref 3.5–5.2)
ALP SERPL-CCNC: 49 U/L (ref 40–129)
ALT SERPL W P-5'-P-CCNC: 20 U/L (ref 0–70)
ANION GAP SERPL CALCULATED.3IONS-SCNC: 13 MMOL/L (ref 7–15)
AST SERPL W P-5'-P-CCNC: 28 U/L (ref 0–45)
BILIRUB SERPL-MCNC: 0.5 MG/DL
BUN SERPL-MCNC: 15.5 MG/DL (ref 8–23)
CALCIUM SERPL-MCNC: 9.9 MG/DL (ref 8.8–10.2)
CHLORIDE SERPL-SCNC: 103 MMOL/L (ref 98–107)
CREAT SERPL-MCNC: 1.16 MG/DL (ref 0.67–1.17)
CREAT UR-MCNC: 35.3 MG/DL
DEPRECATED HCO3 PLAS-SCNC: 23 MMOL/L (ref 22–29)
GFR SERPL CREATININE-BSD FRML MDRD: 67 ML/MIN/1.73M2
GLUCOSE SERPL-MCNC: 84 MG/DL (ref 70–99)
MICROALBUMIN UR-MCNC: 59.5 MG/L
MICROALBUMIN/CREAT UR: 168.56 MG/G CR (ref 0–17)
POTASSIUM SERPL-SCNC: 5.4 MMOL/L (ref 3.4–5.3)
PROT SERPL-MCNC: 7.8 G/DL (ref 6.4–8.3)
SODIUM SERPL-SCNC: 139 MMOL/L (ref 136–145)
T4 FREE SERPL-MCNC: 1.01 NG/DL (ref 0.9–1.7)
TSH SERPL DL<=0.005 MIU/L-ACNC: 6.29 UIU/ML (ref 0.3–4.2)

## 2023-07-19 DIAGNOSIS — R79.89 ELEVATED TSH: Primary | ICD-10-CM

## 2023-07-19 DIAGNOSIS — E07.9 DISORDER OF THYROID, UNSPECIFIED: ICD-10-CM

## 2023-10-27 ENCOUNTER — LAB (OUTPATIENT)
Dept: LAB | Facility: CLINIC | Age: 73
End: 2023-10-27
Payer: MEDICARE

## 2023-10-27 ENCOUNTER — OFFICE VISIT (OUTPATIENT)
Dept: CARDIOLOGY | Facility: CLINIC | Age: 73
End: 2023-10-27
Payer: MEDICARE

## 2023-10-27 VITALS
BODY MASS INDEX: 31.42 KG/M2 | HEIGHT: 69 IN | OXYGEN SATURATION: 97 % | DIASTOLIC BLOOD PRESSURE: 68 MMHG | WEIGHT: 212.1 LBS | HEART RATE: 65 BPM | SYSTOLIC BLOOD PRESSURE: 135 MMHG

## 2023-10-27 DIAGNOSIS — E78.2 MIXED HYPERLIPIDEMIA: ICD-10-CM

## 2023-10-27 DIAGNOSIS — E87.5 HYPERKALEMIA: Primary | ICD-10-CM

## 2023-10-27 DIAGNOSIS — E87.5 HYPERKALEMIA: ICD-10-CM

## 2023-10-27 DIAGNOSIS — I10 ESSENTIAL HYPERTENSION: ICD-10-CM

## 2023-10-27 LAB
ANION GAP SERPL CALCULATED.3IONS-SCNC: 10 MMOL/L (ref 7–15)
BUN SERPL-MCNC: 19.8 MG/DL (ref 8–23)
CALCIUM SERPL-MCNC: 9.3 MG/DL (ref 8.8–10.2)
CHLORIDE SERPL-SCNC: 105 MMOL/L (ref 98–107)
CREAT SERPL-MCNC: 1.13 MG/DL (ref 0.67–1.17)
DEPRECATED HCO3 PLAS-SCNC: 25 MMOL/L (ref 22–29)
EGFRCR SERPLBLD CKD-EPI 2021: 69 ML/MIN/1.73M2
GLUCOSE SERPL-MCNC: 102 MG/DL (ref 70–99)
POTASSIUM SERPL-SCNC: 4.9 MMOL/L (ref 3.4–5.3)
SODIUM SERPL-SCNC: 140 MMOL/L (ref 135–145)

## 2023-10-27 PROCEDURE — 80048 BASIC METABOLIC PNL TOTAL CA: CPT | Performed by: INTERNAL MEDICINE

## 2023-10-27 PROCEDURE — 99214 OFFICE O/P EST MOD 30 MIN: CPT | Performed by: INTERNAL MEDICINE

## 2023-10-27 PROCEDURE — 36415 COLL VENOUS BLD VENIPUNCTURE: CPT | Performed by: INTERNAL MEDICINE

## 2023-10-27 NOTE — LETTER
10/27/2023    Naima Petty, APRN CNP  [address] [address] [address]    RE: Kendrick Escuderoon       Dear Colleague,     I had the pleasure of seeing Kendrick Castellon in the [facility].  CARDIOLOGY CLINIC FOLLOW-UP NOTE      REASON FOR VISIT:   Follow-up CAD    PRIMARY CARE PHYSICIAN:  Naima Petty        History of Present Illness  Kendrick Castellon is an extremely pleasant 73 year old male here for routine follow-up.  His past medical history is significant for CAD s/p 3v CABG (unknown graft targets; per patient has biradial grafts, no SVG) in [place], [place] in 12/2002, hypertension, dyslipidemia, and GERD.  He is a former smoker.    In November 2022 we started him on ezetimibe in addition to his rosuvastatin, and he has tolerated this well with excellent improvement in LDL cholesterol.  Symptomatically he has been doing well with no exertional chest pain or shortness of breath.  He does note that he has been exercising a bit less recently given that he has been extremely involved in multiple construction projects, but plans to get back to routine exercise again soon.  His only other complaint is of some tingling that occurs often after sleeping in both hands, which has been worse for the past 6 months or so.  No other associated neurologic symptoms.    His most recent labs were from 7/17/2023, showing sodium 139, potassium 5.4 (has not been rechecked), creatinine 1.16, TSH 6.29, A1c 5.6%, normal hemoglobin, normal platelets, and normal ALT.  His most recent lipid panel from 2/27/2023 showed total cholesterol 137, HDL 57, LDL 51, and triglycerides 146.  His most recent echocardiogram from 6/25/2019 was essentially normal.  His last ischemic evaluation was an exercise MPI from 5/18/2016 showing a very small area of distal anterolateral ischemia, but was otherwise normal.  His most recent ECG from 5/2/2022 shows sinus rhythm with RBBB.    Assessment & Plan    CAD s/p 3v CABG (unknown graft targets; per  patient has biradial grafts, no SVG) in [place], [place] in 12/2002, CCS 0 angina  Hypertension, reasonably well controlled   Dyslipidemia, well controlled  RBBB  Bilateral paroxysmal tingling in fingers, uncertain etiology (neurologic versus potentially related to bi-radial grafts)  Former tobacco abuse      It was a pleasure to meet with Kendrick again in clinic today.  I am glad to hear that he continues to do well from a cardiac standpoint.  In regards to the paroxysmal tingling in his fingers, I do not have a definite explanation for this.  It is possible that his radial artery excision played a role in this, but there could also be neurologic or other explanations.  Unfortunately, even if this is related to radial artery excision, I do not think there is a great intervention for treatment of this.  If his symptoms dramatically worsen we can consider sending him to vascular surgery, but I would hold off on this for now.  Otherwise, his blood pressure was better today on recheck (135/86), and his lipids are now very well controlled on the Crestor/Zetia combination, so we will continue his current medications unchanged.  I do want to check a repeat chemistry panel just to follow-up on the elevated potassium level seen on the labs drawn by his PCP in July, and if his potassium remains significantly elevated we may need to adjust his antihypertensive regimen given the lisinopril could be affecting this.      - BMP  - Continue Crestor 40 mg daily and ezetimibe 10 mg daily  - Continue aspirin 81 mg daily  - Continue lisinopril 20 mg daily and Toprol-XL 12.5 mg daily for now  - Heart healthy diet, regular aerobic exercise        Follow-up: 1 year, with labs beforehand, or sooner as needed      Chirag Hdz MD  Interventional Cardiology  October 27, 2023        Medications  Current Outpatient Medications   Medication    aspirin 81 MG tablet    ezetimibe (ZETIA) 10 MG tablet    lisinopril (ZESTRIL) 20 MG tablet     metoprolol succinate ER (TOPROL XL) 25 MG 24 hr tablet    rosuvastatin (CRESTOR) 40 MG tablet    sildenafil (VIAGRA) 100 MG tablet    azithromycin (ZITHROMAX) 250 MG tablet     No current facility-administered medications for this visit.     Allergies  No Known Allergies      Physical Exam      BP: (!) 154/80 Pulse: 65     SpO2: 97 %      Vital Signs with Ranges  Pulse:  [65] 65  BP: (154)/(80) 154/80  SpO2:  [97 %] 97 %  212 lbs 1.6 oz    Constitutional: Well-appearing, no acute distress  Respiratory: Normal respiratory effort, CTAB  Cardiovascular: RRR, faint early peaking systolic murmur at the base.  JVP < 7 cm H2O.  There is no LE edema.  Normal carotid upstrokes, no carotid bruits.  Well-healed surgical scars on both forearms      Thank you for allowing me to participate in the care of your patient.      Sincerely,     Chirag Hdz MD     [facility]  cc:   Lalita Powell, CNP  [address] [place] [address]

## 2023-10-27 NOTE — PROGRESS NOTES
CARDIOLOGY CLINIC FOLLOW-UP NOTE      REASON FOR VISIT:   Follow-up CAD    PRIMARY CARE PHYSICIAN:  Naima Petty        History of Present Illness   Kendrick Castellon is an extremely pleasant 73 year old male here for routine follow-up.  His past medical history is significant for CAD s/p 3v CABG (unknown graft targets; per patient has biradial grafts, no SVG) in [place], [place] in 12/2002, hypertension, dyslipidemia, and GERD.  He is a former smoker.    In November 2022 we started him on ezetimibe in addition to his rosuvastatin, and he has tolerated this well with excellent improvement in LDL cholesterol.  Symptomatically he has been doing well with no exertional chest pain or shortness of breath.  He does note that he has been exercising a bit less recently given that he has been extremely involved in multiple construction projects, but plans to get back to routine exercise again soon.  His only other complaint is of some tingling that occurs often after sleeping in both hands, which has been worse for the past 6 months or so.  No other associated neurologic symptoms.    His most recent labs were from 7/17/2023, showing sodium 139, potassium 5.4 (has not been rechecked), creatinine 1.16, TSH 6.29, A1c 5.6%, normal hemoglobin, normal platelets, and normal ALT.  His most recent lipid panel from 2/27/2023 showed total cholesterol 137, HDL 57, LDL 51, and triglycerides 146.  His most recent echocardiogram from 6/25/2019 was essentially normal.  His last ischemic evaluation was an exercise MPI from 5/18/2016 showing a very small area of distal anterolateral ischemia, but was otherwise normal.  His most recent ECG from 5/2/2022 shows sinus rhythm with RBBB.    Assessment & Plan     CAD s/p 3v CABG (unknown graft targets; per patient has biradial grafts, no SVG) in [place], [place] in 12/2002, CCS 0 angina  Hypertension, reasonably well controlled   Dyslipidemia, well controlled  RBBB  Bilateral paroxysmal tingling in fingers,  uncertain etiology (neurologic versus potentially related to bi-radial grafts)  Former tobacco abuse      It was a pleasure to meet with Kendrick again in clinic today.  I am glad to hear that he continues to do well from a cardiac standpoint.  In regards to the paroxysmal tingling in his fingers, I do not have a definite explanation for this.  It is possible that his radial artery excision played a role in this, but there could also be neurologic or other explanations.  Unfortunately, even if this is related to radial artery excision, I do not think there is a great intervention for treatment of this.  If his symptoms dramatically worsen we can consider sending him to vascular surgery, but I would hold off on this for now.  Otherwise, his blood pressure was better today on recheck (135/86), and his lipids are now very well controlled on the Crestor/Zetia combination, so we will continue his current medications unchanged.  I do want to check a repeat chemistry panel just to follow-up on the elevated potassium level seen on the labs drawn by his PCP in July, and if his potassium remains significantly elevated we may need to adjust his antihypertensive regimen given the lisinopril could be affecting this.      - BMP  - Continue Crestor 40 mg daily and ezetimibe 10 mg daily  - Continue aspirin 81 mg daily  - Continue lisinopril 20 mg daily and Toprol-XL 12.5 mg daily for now  - Heart healthy diet, regular aerobic exercise        Follow-up: 1 year, with labs beforehand, or sooner as needed      Chirag Hdz MD  Interventional Cardiology  October 27, 2023        Medications   Current Outpatient Medications   Medication    aspirin 81 MG tablet    ezetimibe (ZETIA) 10 MG tablet    lisinopril (ZESTRIL) 20 MG tablet    metoprolol succinate ER (TOPROL XL) 25 MG 24 hr tablet    rosuvastatin (CRESTOR) 40 MG tablet    sildenafil (VIAGRA) 100 MG tablet    azithromycin (ZITHROMAX) 250 MG tablet     No current  facility-administered medications for this visit.     Allergies   No Known Allergies      Physical Exam       BP: (!) 154/80 Pulse: 65     SpO2: 97 %      Vital Signs with Ranges  Pulse:  [65] 65  BP: (154)/(80) 154/80  SpO2:  [97 %] 97 %  212 lbs 1.6 oz    Constitutional: Well-appearing, no acute distress  Respiratory: Normal respiratory effort, CTAB  Cardiovascular: RRR, faint early peaking systolic murmur at the base.  JVP < 7 cm H2O.  There is no LE edema.  Normal carotid upstrokes, no carotid bruits.  Well-healed surgical scars on both forearms

## 2023-11-13 NOTE — PROGRESS NOTES
COVID-19 PCR test completed. Patient handout For Patients Who Have Been Tested for Covid-19 (Coronavirus) was given to the patient, which includes test result notification process.     RN will endorse to day shift, MD aware. MD notified and already aware Plan of care ongoing. MD made aware. No further treatments at this time, MD will endorse to day shift. Okay to extend IV.

## 2024-01-08 ENCOUNTER — MYC MEDICAL ADVICE (OUTPATIENT)
Dept: FAMILY MEDICINE | Facility: CLINIC | Age: 74
End: 2024-01-08
Payer: MEDICARE

## 2024-06-17 ENCOUNTER — PATIENT OUTREACH (OUTPATIENT)
Dept: CARE COORDINATION | Facility: CLINIC | Age: 74
End: 2024-06-17
Payer: MEDICARE

## 2024-07-31 DIAGNOSIS — I10 ESSENTIAL HYPERTENSION WITH GOAL BLOOD PRESSURE LESS THAN 130/80: ICD-10-CM

## 2024-07-31 DIAGNOSIS — N18.2 CKD (CHRONIC KIDNEY DISEASE) STAGE 2, GFR 60-89 ML/MIN: ICD-10-CM

## 2024-07-31 DIAGNOSIS — I25.10 CORONARY ARTERY DISEASE INVOLVING NATIVE CORONARY ARTERY OF NATIVE HEART WITHOUT ANGINA PECTORIS: ICD-10-CM

## 2024-07-31 RX ORDER — EZETIMIBE 10 MG/1
10 TABLET ORAL DAILY
Qty: 90 TABLET | Refills: 0 | Status: SHIPPED | OUTPATIENT
Start: 2024-07-31 | End: 2024-08-14

## 2024-07-31 RX ORDER — LISINOPRIL 20 MG/1
20 TABLET ORAL DAILY
Qty: 90 TABLET | Refills: 0 | Status: SHIPPED | OUTPATIENT
Start: 2024-07-31 | End: 2024-09-05

## 2024-08-11 DIAGNOSIS — I10 ESSENTIAL HYPERTENSION WITH GOAL BLOOD PRESSURE LESS THAN 130/80: ICD-10-CM

## 2024-08-12 RX ORDER — METOPROLOL SUCCINATE 25 MG/1
TABLET, EXTENDED RELEASE ORAL
Qty: 45 TABLET | Refills: 0 | Status: SHIPPED | OUTPATIENT
Start: 2024-08-12 | End: 2024-09-05

## 2024-08-14 ENCOUNTER — MYC REFILL (OUTPATIENT)
Dept: FAMILY MEDICINE | Facility: CLINIC | Age: 74
End: 2024-08-14
Payer: MEDICARE

## 2024-08-14 DIAGNOSIS — I25.10 CORONARY ARTERY DISEASE INVOLVING NATIVE CORONARY ARTERY OF NATIVE HEART WITHOUT ANGINA PECTORIS: ICD-10-CM

## 2024-08-14 DIAGNOSIS — E78.2 MIXED HYPERLIPIDEMIA: ICD-10-CM

## 2024-08-15 RX ORDER — ROSUVASTATIN CALCIUM 40 MG/1
40 TABLET, COATED ORAL DAILY
Qty: 90 TABLET | Refills: 0 | Status: SHIPPED | OUTPATIENT
Start: 2024-08-15 | End: 2024-09-05

## 2024-08-15 RX ORDER — EZETIMIBE 10 MG/1
10 TABLET ORAL DAILY
Qty: 90 TABLET | Refills: 0 | Status: SHIPPED | OUTPATIENT
Start: 2024-08-15 | End: 2024-09-05

## 2024-09-05 ENCOUNTER — OFFICE VISIT (OUTPATIENT)
Dept: FAMILY MEDICINE | Facility: CLINIC | Age: 74
End: 2024-09-05
Payer: MEDICARE

## 2024-09-05 ENCOUNTER — ANCILLARY PROCEDURE (OUTPATIENT)
Dept: GENERAL RADIOLOGY | Facility: CLINIC | Age: 74
End: 2024-09-05
Attending: NURSE PRACTITIONER
Payer: MEDICARE

## 2024-09-05 VITALS
HEART RATE: 60 BPM | RESPIRATION RATE: 14 BRPM | DIASTOLIC BLOOD PRESSURE: 78 MMHG | SYSTOLIC BLOOD PRESSURE: 160 MMHG | BODY MASS INDEX: 32.64 KG/M2 | OXYGEN SATURATION: 97 % | TEMPERATURE: 98.5 F | HEIGHT: 69 IN | WEIGHT: 220.4 LBS

## 2024-09-05 DIAGNOSIS — K21.9 GASTROESOPHAGEAL REFLUX DISEASE WITHOUT ESOPHAGITIS: ICD-10-CM

## 2024-09-05 DIAGNOSIS — M54.41 MIDLINE LOW BACK PAIN WITH RIGHT-SIDED SCIATICA, UNSPECIFIED CHRONICITY: ICD-10-CM

## 2024-09-05 DIAGNOSIS — Z00.00 ENCOUNTER FOR MEDICARE ANNUAL WELLNESS EXAM: Primary | ICD-10-CM

## 2024-09-05 DIAGNOSIS — I10 ESSENTIAL HYPERTENSION WITH GOAL BLOOD PRESSURE LESS THAN 130/80: ICD-10-CM

## 2024-09-05 DIAGNOSIS — Z23 NEED FOR SHINGLES VACCINE: ICD-10-CM

## 2024-09-05 DIAGNOSIS — I25.10 CORONARY ARTERY DISEASE INVOLVING NATIVE CORONARY ARTERY OF NATIVE HEART WITHOUT ANGINA PECTORIS: ICD-10-CM

## 2024-09-05 DIAGNOSIS — E78.5 HYPERLIPIDEMIA LDL GOAL <100: ICD-10-CM

## 2024-09-05 DIAGNOSIS — N18.2 CKD (CHRONIC KIDNEY DISEASE) STAGE 2, GFR 60-89 ML/MIN: ICD-10-CM

## 2024-09-05 DIAGNOSIS — Z13.1 ENCOUNTER FOR SCREENING FOR DIABETES MELLITUS: ICD-10-CM

## 2024-09-05 DIAGNOSIS — E78.2 MIXED HYPERLIPIDEMIA: ICD-10-CM

## 2024-09-05 PROBLEM — R97.20 INCREASING PROSTATE SPECIFIC ANTIGEN LEVEL: Status: RESOLVED | Noted: 2018-11-20 | Resolved: 2024-09-05

## 2024-09-05 LAB
ALBUMIN SERPL BCG-MCNC: 4.4 G/DL (ref 3.5–5.2)
ALP SERPL-CCNC: 54 U/L (ref 40–150)
ALT SERPL W P-5'-P-CCNC: 16 U/L (ref 0–70)
ANION GAP SERPL CALCULATED.3IONS-SCNC: 10 MMOL/L (ref 7–15)
AST SERPL W P-5'-P-CCNC: 25 U/L (ref 0–45)
BASOPHILS # BLD AUTO: 0 10E3/UL (ref 0–0.2)
BASOPHILS NFR BLD AUTO: 1 %
BILIRUB SERPL-MCNC: 0.4 MG/DL
BUN SERPL-MCNC: 16.1 MG/DL (ref 8–23)
CALCIUM SERPL-MCNC: 9.3 MG/DL (ref 8.8–10.4)
CHLORIDE SERPL-SCNC: 106 MMOL/L (ref 98–107)
CHOLEST SERPL-MCNC: 126 MG/DL
CREAT SERPL-MCNC: 1.22 MG/DL (ref 0.67–1.17)
CREAT UR-MCNC: 157 MG/DL
EGFRCR SERPLBLD CKD-EPI 2021: 63 ML/MIN/1.73M2
EOSINOPHIL # BLD AUTO: 0.1 10E3/UL (ref 0–0.7)
EOSINOPHIL NFR BLD AUTO: 1 %
ERYTHROCYTE [DISTWIDTH] IN BLOOD BY AUTOMATED COUNT: 13 % (ref 10–15)
FASTING STATUS PATIENT QL REPORTED: YES
FASTING STATUS PATIENT QL REPORTED: YES
GLUCOSE SERPL-MCNC: 98 MG/DL (ref 70–99)
HBA1C MFR BLD: 5.6 % (ref 0–5.6)
HCO3 SERPL-SCNC: 25 MMOL/L (ref 22–29)
HCT VFR BLD AUTO: 45.2 % (ref 40–53)
HDLC SERPL-MCNC: 55 MG/DL
HGB BLD-MCNC: 14.8 G/DL (ref 13.3–17.7)
IMM GRANULOCYTES # BLD: 0 10E3/UL
IMM GRANULOCYTES NFR BLD: 0 %
LDLC SERPL CALC-MCNC: 54 MG/DL
LYMPHOCYTES # BLD AUTO: 2 10E3/UL (ref 0.8–5.3)
LYMPHOCYTES NFR BLD AUTO: 26 %
MCH RBC QN AUTO: 29.4 PG (ref 26.5–33)
MCHC RBC AUTO-ENTMCNC: 32.7 G/DL (ref 31.5–36.5)
MCV RBC AUTO: 90 FL (ref 78–100)
MICROALBUMIN UR-MCNC: 244 MG/L
MICROALBUMIN/CREAT UR: 155.41 MG/G CR (ref 0–17)
MONOCYTES # BLD AUTO: 0.8 10E3/UL (ref 0–1.3)
MONOCYTES NFR BLD AUTO: 10 %
NEUTROPHILS # BLD AUTO: 4.9 10E3/UL (ref 1.6–8.3)
NEUTROPHILS NFR BLD AUTO: 62 %
NONHDLC SERPL-MCNC: 71 MG/DL
PLATELET # BLD AUTO: 190 10E3/UL (ref 150–450)
POTASSIUM SERPL-SCNC: 5.2 MMOL/L (ref 3.4–5.3)
PROT SERPL-MCNC: 7.5 G/DL (ref 6.4–8.3)
RBC # BLD AUTO: 5.04 10E6/UL (ref 4.4–5.9)
SODIUM SERPL-SCNC: 141 MMOL/L (ref 135–145)
TRIGL SERPL-MCNC: 87 MG/DL
TSH SERPL DL<=0.005 MIU/L-ACNC: 4.09 UIU/ML (ref 0.3–4.2)
WBC # BLD AUTO: 7.9 10E3/UL (ref 4–11)

## 2024-09-05 PROCEDURE — G0008 ADMIN INFLUENZA VIRUS VAC: HCPCS | Performed by: NURSE PRACTITIONER

## 2024-09-05 PROCEDURE — 72100 X-RAY EXAM L-S SPINE 2/3 VWS: CPT | Mod: TC | Performed by: RADIOLOGY

## 2024-09-05 PROCEDURE — 80053 COMPREHEN METABOLIC PANEL: CPT | Performed by: NURSE PRACTITIONER

## 2024-09-05 PROCEDURE — 82570 ASSAY OF URINE CREATININE: CPT | Performed by: NURSE PRACTITIONER

## 2024-09-05 PROCEDURE — 85025 COMPLETE CBC W/AUTO DIFF WBC: CPT | Performed by: NURSE PRACTITIONER

## 2024-09-05 PROCEDURE — 99214 OFFICE O/P EST MOD 30 MIN: CPT | Mod: 25 | Performed by: NURSE PRACTITIONER

## 2024-09-05 PROCEDURE — 83036 HEMOGLOBIN GLYCOSYLATED A1C: CPT | Performed by: NURSE PRACTITIONER

## 2024-09-05 PROCEDURE — 90662 IIV NO PRSV INCREASED AG IM: CPT | Performed by: NURSE PRACTITIONER

## 2024-09-05 PROCEDURE — 36415 COLL VENOUS BLD VENIPUNCTURE: CPT | Performed by: NURSE PRACTITIONER

## 2024-09-05 PROCEDURE — G0439 PPPS, SUBSEQ VISIT: HCPCS | Performed by: NURSE PRACTITIONER

## 2024-09-05 PROCEDURE — 84443 ASSAY THYROID STIM HORMONE: CPT | Performed by: NURSE PRACTITIONER

## 2024-09-05 PROCEDURE — 80061 LIPID PANEL: CPT | Performed by: NURSE PRACTITIONER

## 2024-09-05 PROCEDURE — 82043 UR ALBUMIN QUANTITATIVE: CPT | Performed by: NURSE PRACTITIONER

## 2024-09-05 RX ORDER — ROSUVASTATIN CALCIUM 40 MG/1
40 TABLET, COATED ORAL DAILY
Qty: 90 TABLET | Refills: 3 | Status: SHIPPED | OUTPATIENT
Start: 2024-09-05

## 2024-09-05 RX ORDER — METOPROLOL SUCCINATE 25 MG/1
TABLET, EXTENDED RELEASE ORAL
Qty: 45 TABLET | Refills: 3 | Status: SHIPPED | OUTPATIENT
Start: 2024-09-05

## 2024-09-05 RX ORDER — EZETIMIBE 10 MG/1
10 TABLET ORAL DAILY
Qty: 90 TABLET | Refills: 3 | Status: SHIPPED | OUTPATIENT
Start: 2024-09-05

## 2024-09-05 RX ORDER — LISINOPRIL 40 MG/1
40 TABLET ORAL DAILY
Qty: 90 TABLET | Refills: 0 | Status: SHIPPED | OUTPATIENT
Start: 2024-09-05

## 2024-09-05 ASSESSMENT — PAIN SCALES - GENERAL: PAINLEVEL: NO PAIN (0)

## 2024-09-05 NOTE — PROGRESS NOTES
Preventive Care Visit  [facility] JOSE Real CNP, Family Medicine  Sep 5, 2024      Assessment & Plan   Encounter for Medicare annual wellness exam  Discussed options and rationale.  Ordered HCM testing per our discussion and patient decision based on USPSTF and Cancer screening guidelines.      Reinforced healthy habits with lifestyle, exercise and nutrition.    Flu shot today    - CBC with Platelets & Differential; Future  - Lipid panel reflex to direct LDL Fasting; Future  - Hemoglobin A1c; Future  - TSH with free T4 reflex; Future        Need for shingles vaccine  - pharmacy advised.     CKD (chronic kidney disease) stage 2, GFR 60-89 ml/min  Monitoring  Adding Jardiance. Re-check in 3 months.   Discussed benefits of medication.     - COMPREHENSIVE METABOLIC PANEL; Future  - Albumin Random Urine Quantitative with Creat Ratio; Future  - OFFICE/OUTPT VISIT,EST,LEVL IV  - empagliflozin (JARDIANCE) 10 MG TABS tablet; Take 1 tablet (10 mg) by mouth daily.  - lisinopril (ZESTRIL) 40 MG tablet; Take 1 tablet (40 mg) by mouth daily.    Midline low back pain with right-sided sciatica, unspecified chronicity  Initial work-up, PT advised. X-rays today- awaiting read. Suspect lumbar arthritis.   Re-check if not improving and will progress treatment/work-up, discussed red flags.   - OFFICE/OUTPT VISIT,EST,LEVL IV  - Physical Therapy  Referral; Future  - XR Lumbar Spine 2/3 Views; Future    Gastroesophageal reflux disease without esophagitis  Calm, no symptoms, monitoring.   - OFFICE/OUTPT VISIT,EST,LEVL IV    Hyperlipidemia LDL goal <100  Continue statin/Zetia  - OFFICE/OUTPT VISIT,EST,LEVL IV        Coronary artery disease involving native coronary artery of native heart without angina pectoris  Asymptomatic, continue plan.   - empagliflozin (JARDIANCE) 10 MG TABS tablet; Take 1 tablet (10 mg) by mouth daily.  - ezetimibe (ZETIA) 10 MG tablet; Take 1 tablet (10 mg) by mouth daily.  -  "rosuvastatin (CRESTOR) 40 MG tablet; Take 1 tablet (40 mg) by mouth daily.    Essential hypertension with goal blood pressure less than 130/80  Elevated BP-   Increasing Lisinopril- re-check BP in 3 weeks. Labs in 3 months.   - lisinopril (ZESTRIL) 40 MG tablet; Take 1 tablet (40 mg) by mouth daily.  - metoprolol succinate ER (TOPROL XL) 25 MG 24 hr tablet; Take 1/2 tablet (12.5 mg) by mouth daily    Mixed hyperlipidemia  As above.     - rosuvastatin (CRESTOR) 40 MG tablet; Take 1 tablet (40 mg) by mouth daily.    Encounter for screening for diabetes mellitus    - Hemoglobin A1c; Future    Patient has been advised of split billing requirements and indicates understanding: Yes        BMI  Estimated body mass index is 32.46 kg/m  as calculated from the following:    Height as of this encounter: 1.755 m (5' 9.09\").    Weight as of this encounter: 100 kg (220 lb 6.4 oz).   Weight management plan: Discussed healthy diet and exercise guidelines pt. Declined referral.     Counseling  Appropriate preventive services were addressed with this patient via screening, questionnaire, or discussion as appropriate for fall prevention, nutrition, physical activity, Tobacco-use cessation, social engagement, weight loss and cognition.  Checklist reviewing preventive services available has been given to the patient.  Reviewed patient's diet, addressing concerns and/or questions.   The patient was instructed to see the dentist every 6 months.   Patient reported safety concerns were addressed today.    MEDICATIONS:        - Continue other medications without change    Santana Marks is a 74 year old, presenting for the following:    - pt reports low back pain- mild, moderate, center/all over low back, particularly in the morning, can shoot down legs- right side, to post. thigh, pain never goes up, some days aches more/longer than others, resting/staying away from heavy lifting helps - pt didn't know how to report that and says he has " no problems with balance so gait speed test not completed. No numbness/tingling, no weakness  Occ ibuprofen, using mostly Tylenol arthritis. Will rest while mowing lawn.   Is walking his dogs.   Normal sleep.   Weight is also up- declines weight referral.    Physical        9/5/2024     9:03 AM   Additional Questions   Roomed by AG   Accompanied by self         Health Care Directive  Patient does not have a Health Care Directive or Living Will: Discussed advance care planning with patient; information given to patient to review.    HPI  CAD- stable, on statin- no symptoms.   CKD- is SGLT2 candidate.     Colonoscopy done 2022- UTD  No longer checking PSA.     No calf claudication symptoms.     Hypertension Follow-up    Do you check your blood pressure regularly outside of the clinic? No   Elevated today- no symptoms.           9/5/2024   General Health   How would you rate your overall physical health? Good   Feel stress (tense, anxious, or unable to sleep) Not at all            9/5/2024   Nutrition   Diet: Regular (no restrictions)            9/5/2024   Exercise   Days per week of moderate/strenous exercise 5 days            9/5/2024   Social Factors   Frequency of gathering with friends or relatives Once a week   Worry food won't last until get money to buy more No   Food not last or not have enough money for food? No   Do you have housing? (Housing is defined as stable permanent housing and does not include staying ouside in a car, in a tent, in an abandoned building, in an overnight shelter, or couch-surfing.) Yes   Are you worried about losing your housing? No   Lack of transportation? No   Unable to get utilities (heat,electricity)? No            9/5/2024   Fall Risk   Fallen 2 or more times in the past year? No    No   Trouble with walking or balance? Yes    No   Gait Speed Test (Document in seconds) 3.7   Gait Speed Test Interpretation Less than or equal to 5.00 seconds - PASS       Multiple values from one  day are sorted in reverse-chronological order          2024   Activities of Daily Living- Home Safety   Needs help with the following daily activites None of the above   Safety concerns in the home Throw rugs in the hallway            2024   Dental   Dentist two times every year? (!) NO            2024   Hearing Screening   Hearing concerns? None of the above            2024   Driving Risk Screening   Patient/family members have concerns about driving No            2024   General Alertness/Fatigue Screening   Have you been more tired than usual lately? No            2024   Urinary Incontinence Screening   Bothered by leaking urine in past 6 months No            2024   TB Screening   Were you born outside of the [place]? No            Today's PHQ-2 Score:       2024     8:41 AM   PHQ-2 (  [employer])   Q1: Little interest or pleasure in doing things 0   Q2: Feeling down, depressed or hopeless 0   PHQ-2 Score 0   Q1: Little interest or pleasure in doing things Not at all   Q2: Feeling down, depressed or hopeless Not at all   PHQ-2 Score 0           2024   Substance Use   Alcohol more than 3/day or more than 7/wk No   Do you have a current opioid prescription? No   How severe/bad is pain from 1 to 10? 5/10   Do you use any other substances recreationally? No        Social History     Tobacco Use    Smoking status: Former     Current packs/day: 0.00     Average packs/day: 1 pack/day for 15.0 years (15.0 ttl pk-yrs)     Types: Cigarettes     Start date: 1972     Quit date: 1987     Years since quittin.4     Passive exposure: Past    Smokeless tobacco: Never   Vaping Use    Vaping status: Never Used   Substance Use Topics    Alcohol use: Not Currently     Alcohol/week: 2.0 standard drinks of alcohol     Types: 2 Standard drinks or equivalent per week     Comment: 0-2 perweek     Drug use: No       ASCVD Risk   The 10-year ASCVD risk score (Fernando LORENZANA, et al., 2019) is:  "29.6%    Values used to calculate the score:      Age: 74 years      Sex: Male      Is Non- : No      Diabetic: No      Tobacco smoker: No      Systolic Blood Pressure: 160 mmHg      Is BP treated: Yes      HDL Cholesterol: 57 mg/dL      Total Cholesterol: 137 mg/dL            Reviewed and updated as needed this visit by Provider                      Current providers sharing in care for this patient include:  Patient Care Team:  Naima Petty APRN CNP as PCP - General (Family Practice)  Naima Petty APRN CNP as Assigned PCP  Chirag Hdz MD as Assigned Heart and Vascular Provider    The following health maintenance items are reviewed in Epic and correct as of today:  Health Maintenance   Topic Date Due    ZOSTER IMMUNIZATION (3 of 3) 03/26/2021    LIPID  02/27/2024    CMP  07/17/2024    MICROALBUMIN  07/17/2024    COVID-19 Vaccine (8 - 2023-24 season) 09/01/2024    BMP  10/27/2024    MEDICARE ANNUAL WELLNESS VISIT  09/05/2025    ANNUAL REVIEW OF HM ORDERS  09/05/2025    FALL RISK ASSESSMENT  09/05/2025    DTAP/TDAP/TD IMMUNIZATION (3 - Td or Tdap) 04/28/2026    GLUCOSE  10/27/2026    COLORECTAL CANCER SCREENING  05/04/2029    ADVANCE CARE PLANNING  09/05/2029    PHQ-2 (once per calendar year)  Completed    INFLUENZA VACCINE  Completed    Pneumococcal Vaccine: 65+ Years  Completed    URINALYSIS  Completed    RSV VACCINE  Completed    AORTIC ANEURYSM SCREENING (SYSTEM ASSIGNED)  Completed    HEPATITIS C SCREENING  Addressed    HPV IMMUNIZATION  Aged Out    MENINGITIS IMMUNIZATION  Aged Out    RSV MONOCLONAL ANTIBODY  Aged Out         Review of Systems  Constitutional, HEENT, cardiovascular, pulmonary, GI, , musculoskeletal, neuro, skin, endocrine and psych systems are negative, except as otherwise noted.     Objective    Exam  BP (!) 160/78   Pulse 60   Temp 98.5  F (36.9  C) (Temporal)   Resp 14   Ht 1.755 m (5' 9.09\")   Wt 100 kg (220 lb 6.4 oz)   SpO2 97%   BMI 32.46 " "kg/m     Estimated body mass index is 32.46 kg/m  as calculated from the following:    Height as of this encounter: 1.755 m (5' 9.09\").    Weight as of this encounter: 100 kg (220 lb 6.4 oz).    Physical Exam  GENERAL: alert and no distress  EYES: Eyes grossly normal to inspection, PERRL and conjunctivae and sclerae normal  HENT: ear canals and TM's normal, nose and mouth without ulcers or lesions  NECK: no adenopathy, no asymmetry, masses, or scars  RESP: lungs clear to auscultation - no rales, rhonchi or wheezes  CV: regular rate and rhythm, normal S1 S2, no S3 or S4, no murmur, click or rub, no peripheral edema, normal post tib. Pulses- bilat.     MS: extremities normal- no gross deformities noted and ROM is fairly good, No vertebral or SI joint tenderness, strength 5/5 of LE with push/pull    Neuro: no gross deficits, normal sensation of LE, pattellar reflexes: 2/3 on right, 3/3 on left  Straight leg raise: deferred   SKIN: no suspicious lesions or rashes  NEURO: Normal strength and tone, mentation intact and speech normal  PSYCH: mentation appears normal, affect normal/bright        9/5/2024   Mini Cog   Clock Draw Score 2 Normal   3 Item Recall 2 objects recalled   Mini Cog Total Score 4                 Signed Electronically by: JOSE Vallejo CNP    "

## 2024-09-05 NOTE — PATIENT INSTRUCTIONS
Patient Education   Body Mass Index: Care Instructions  Overview     Body mass index (BMI) can help you see if your weight is raising your risk for health problems. It uses a formula to compare how much you weigh with how tall you are.  A BMI lower than 18.5 is considered underweight.  A BMI between 18.5 and 24.9 is considered healthy.  A BMI between 25 and 29.9 is considered overweight. A BMI of 30 or higher is considered obese.  If your BMI is in the normal range, it means that you have a lower risk for weight-related health problems. If your BMI is in the overweight or obese range, you may be at increased risk for weight-related health problems, such as high blood pressure, heart disease, stroke, arthritis or joint pain, and diabetes. If your BMI is in the underweight range, you may be at increased risk for health problems such as fatigue, lower protection (immunity) against illness, muscle loss, bone loss, hair loss, and hormone problems.  BMI is just one measure of your risk for weight-related health problems. You may be at higher risk for health problems if you are not active, you eat an unhealthy diet, or you drink too much alcohol or use tobacco products.  Follow-up care is a key part of your treatment and safety. Be sure to make and go to all appointments, and call your doctor if you are having problems. It's also a good idea to know your test results and keep a list of the medicines you take.  How can you care for yourself at home?  Practice healthy eating habits. This includes eating plenty of fruits, vegetables, whole grains, lean protein, and low-fat dairy.  If your doctor recommends it, get more exercise. Walking is a good choice. Bit by bit, increase the amount you walk every day. Try for at least 30 minutes on most days of the week.  Do not smoke. Smoking can increase your risk for health problems. If you need help quitting, talk to your doctor about stop-smoking programs and medicines. These can  "increase your chances of quitting for good.  Limit alcohol to 2 drinks a day for men and 1 drink a day for women. Too much alcohol can cause health problems.  If you have a BMI higher than 25  Your doctor may do other tests to check your risk for weight-related health problems. This may include measuring the distance around your waist. A waist measurement of more than 40 inches in men or 35 inches in women can increase the risk of weight-related health problems.  Talk with your doctor about steps you can take to stay healthy or improve your health. You may need to make lifestyle changes to lose weight and stay healthy, such as changing your diet and getting regular exercise.  If you have a BMI lower than 18.5  Your doctor may do other tests to check your risk for health problems.  Talk with your doctor about steps you can take to stay healthy or improve your health. You may need to make lifestyle changes to gain or maintain weight and stay healthy, such as getting more healthy foods in your diet and doing exercises to build muscle.  Where can you learn more?  Go to https://www.basico.com.net/patiented  Enter S176 in the search box to learn more about \"Body Mass Index: Care Instructions.\"  Current as of: May 13, 2023               Content Version: 14.0    0320-6283 [employer].   Care instructions adapted under license by your healthcare professional. If you have questions about a medical condition or this instruction, always ask your healthcare professional. [employer] disclaims any warranty or liability for your use of this information.      Preventive Care Advice   This is general advice given by our system to help you stay healthy. However, your care team may have specific advice just for you. Please talk to your care team about your preventive care needs.  Nutrition  Eat 5 or more servings of fruits and vegetables each day.  Try wheat bread, brown rice and whole grain pasta (instead of " white bread, rice, and pasta).  Get enough calcium and vitamin D. Check the label on foods and aim for 100% of the RDA (recommended daily allowance).  Lifestyle  Exercise at least 150 minutes each week  (30 minutes a day, 5 days a week).  Do muscle strengthening activities 2 days a week. These help control your weight and prevent disease.  No smoking.  Wear sunscreen to prevent skin cancer.  Have a dental exam and cleaning every 6 months.  Yearly exams  See your health care team every year to talk about:  Any changes in your health.  Any medicines your care team has prescribed.  Preventive care, family planning, and ways to prevent chronic diseases.  Shots (vaccines)   HPV shots (up to age 26), if you've never had them before.  Hepatitis B shots (up to age 59), if you've never had them before.  COVID-19 shot: Get this shot when it's due.  Flu shot: Get a flu shot every year.  Tetanus shot: Get a tetanus shot every 10 years.  Pneumococcal, hepatitis A, and RSV shots: Ask your care team if you need these based on your risk.  Shingles shot (for age 50 and up)  General health tests  Diabetes screening:  Starting at age 35, Get screened for diabetes at least every 3 years.  If you are younger than age 35, ask your care team if you should be screened for diabetes.  Cholesterol test: At age 39, start having a cholesterol test every 5 years, or more often if advised.  Bone density scan (DEXA): At age 50, ask your care team if you should have this scan for osteoporosis (brittle bones).  Hepatitis C: Get tested at least once in your life.  STIs (sexually transmitted infections)  Before age 24: Ask your care team if you should be screened for STIs.  After age 24: Get screened for STIs if you're at risk. You are at risk for STIs (including HIV) if:  You are sexually active with more than one person.  You don't use condoms every time.  You or a partner was diagnosed with a sexually transmitted infection.  If you are at risk for  HIV, ask about PrEP medicine to prevent HIV.  Get tested for HIV at least once in your life, whether you are at risk for HIV or not.  Cancer screening tests  Cervical cancer screening: If you have a cervix, begin getting regular cervical cancer screening tests starting at age 21.  Breast cancer scan (mammogram): If you've ever had breasts, begin having regular mammograms starting at age 40. This is a scan to check for breast cancer.  Colon cancer screening: It is important to start screening for colon cancer at age 45.  Have a colonoscopy test every 10 years (or more often if you're at risk) Or, ask your provider about stool tests like a FIT test every year or Cologuard test every 3 years.  To learn more about your testing options, visit:   .  For help making a decision, visit:   https://bit.ly/ux43637.  Prostate cancer screening test: If you have a prostate, ask your care team if a prostate cancer screening test (PSA) at age 55 is right for you.  Lung cancer screening: If you are a current or former smoker ages 50 to 80, ask your care team if ongoing lung cancer screenings are right for you.  For informational purposes only. Not to replace the advice of your health care provider. Copyright   2023 [facility]. All rights reserved. Clinically reviewed by the [facility]itions Program. "[employer]" 191182 - REV 01/24.  Nutrition for Older Adults: Care Instructions  Overview     Good nutrition is important at any age. But it is especially important for older adults. Eating healthy foods helps keep your body strong. And it can help lower your risk for disease.  As you get older, your body needs more of certain nutrients. These include vitamin B12, calcium, and vitamin D. But it may be harder for you to get these and other important nutrients. This could be for many reasons. You may not feel as hungry as you used to. Or you could have problems with your teeth or mouth that make it hard to chew. Or  you may not enjoy planning and preparing meals, especially if you live alone.  Talk with your doctor if you want help getting the most nutrition from what you eat. They may have you work with a dietitian to help you plan meals.  Follow-up care is a key part of your treatment and safety. Be sure to make and go to all appointments, and call your doctor if you are having problems. It's also a good idea to know your test results and keep a list of the medicines you take.  How can you care for yourself at home?  To stay healthy  Eat a variety of foods. The more you vary the foods you eat, the more vitamins, minerals, and other nutrients you get.  Ask your doctor if you should take a multivitamin. Choose one with about 100% of the daily value (DV) for vitamins and minerals. Do not take more than 100% of the daily value for any vitamin or mineral unless your doctor tells you to. Talk with your doctor if you are not sure which multivitamin is right for you.  Try to eat lots of fruits and vegetables. Fresh or frozen vegetables and fruits are healthy choices. Choose canned vegetables that have no salt added and fruits that are canned in their own juice or light syrup.  Include foods that are high in vitamin B12 in your diet. Good choices are fortified breakfast cereal, nonfat or low-fat milk and other dairy products, meat, poultry, fish, and eggs.  Get enough calcium and vitamin D. Good choices include nonfat or low-fat milk, cheese, and yogurt. Other good options are tofu, orange juice with added calcium, and some leafy green vegetables, such as krystal greens and kale. If you don't use milk products, talk to your doctor about calcium and vitamin D supplements.  Try to eat protein foods every day. Good choices include lean meat, fish, poultry, eggs, and cheese. Other good options are cooked beans, peanut butter, and nuts and seeds.  Choose whole grains for half of the grains you eat. Look for 100% whole wheat bread,  whole-grain cereals, brown rice, and other whole grains.  If you have constipation  Eat high-fiber foods every day if you can. These include fruits, vegetables, cooked dried beans, and whole grains.  Drink plenty of fluids. If you have kidney, heart, or liver disease and have to limit fluids, talk with your doctor before you increase the amount of fluids you drink.  Ask your doctor if stool softeners may help keep your bowels regular.  If you have mouth problems that make chewing hard  Pick canned or cooked fruits and vegetables. These are often softer.  Chop or shred meat, poultry, and fish. Add sauce or gravy to the meat to help keep it moist.  Pick other protein foods that are soft. These include cheese, peanut butter, cooked beans, cottage cheese, and eggs.  If you have trouble shopping for yourself  Ask a local food store to deliver groceries to your home.  Contact your local area agency on aging and ask about resources that can help.  Ask a family member or neighbor to help you.  If you have trouble preparing meals  Try easier cooking methods such as using a slow cooker or microwave oven.  Let the grocery store do some of the work for you. Look for precut, washed, and ready-to-eat foods.  Take part in group meal programs. You can find these through senior citizen programs.  Have meals brought to your home. Your community may offer programs that deliver meals, such as Meals on Wheels. Or you could use an online meal delivery service.  If you are able, take a cooking class.  If your appetite is poor  Try to eat meals on a regular schedule. It may help to eat smaller meals more often throughout the day.  If you can, eat some meals with other people. You could ask family or friends to eat with you. Or you could take part in group meal programs offered in your community.  Ask your doctor if your medicines could cause appetite or taste problems. If so, ask about changing medicines.  Add spices and herbs to increase  "the flavor of food.  If you think you are depressed, ask your doctor for help. Depression can affect your appetite. And it can make it hard to do everyday activities like grocery shopping and making meals. Treatment can help.  When should you call for help?  Watch closely for changes in your health, and be sure to contact your doctor if you have any problems.  Where can you learn more?  Go to https://www.[employer].net/patiented  Enter L643 in the search box to learn more about \"Nutrition for Older Adults: Care Instructions.\"  Current as of: September 25, 2023               Content Version: 14.0    1781-4023 [employer].   Care instructions adapted under license by your healthcare professional. If you have questions about a medical condition or this instruction, always ask your healthcare professional. [employer] disclaims any warranty or liability for your use of this information.      Learning About Activities of Daily Living  What are activities of daily living?     Activities of daily living (ADLs) are the basic self-care tasks you do every day. These include eating, bathing, dressing, and moving around.  As you age, and if you have health problems, you may find that it's harder to do some of these tasks. If so, your doctor can suggest ideas that may help.  To measure what kind of help you may need, your doctor will ask how well you are able to do ADLs. Let your doctor know if there are any tasks that you are having trouble doing. This is an important first step to getting help. And when you have the help you need, you can stay as independent as possible.  How will a doctor assess your ADLs?  Asking about ADLs is part of a routine health checkup your doctor will likely do as you age. Your health check might be done in a doctor's office, in your home, or at a hospital. The goal is to find out if you are having any problems that could make it hard to care for yourself or that make it " unsafe for you to be on your own.  To measure your ADLs, your doctor will ask how hard it is for you to do routine tasks. Your doctor may also want to know if you have changed the way you do a task because of a health problem. Your doctor may watch how you:  Walk back and forth.  Keep your balance while you stand or walk.  Move from sitting to standing or from a bed to a chair.  Button or unbutton a shirt or sweater.  Remove and put on your shoes.  It's common to feel a little worried or anxious if you find you can't do all the things you used to be able to do. Talking with your doctor about ADLs is a way to make sure you're as safe as possible and able to care for yourself as well as you can. You may want to bring a caregiver, friend, or family member to your checkup. They can help you talk to your doctor.  Follow-up care is a key part of your treatment and safety. Be sure to make and go to all appointments, and call your doctor if you are having problems. It's also a good idea to know your test results and keep a list of the medicines you take.  Current as of: October 24, 2023  Content Version: 14.1    7421-1827 [email or web address].   Care instructions adapted under license by your healthcare professional. If you have questions about a medical condition or this instruction, always ask your healthcare professional. [email or web address] disclaims any warranty or liability for your use of this information.    Preventing Falls: Care Instructions  Injuries and health problems such as trouble walking or poor eyesight can increase your risk of falling. So can some medicines. But there are things you can do to help prevent falls. You can exercise to get stronger. You can also arrange your home to make it safer.    Talk to your doctor about the medicines you take. Ask if any of them increase the risk of falls and whether they can be changed or stopped.   Try to exercise regularly. It can help improve your strength  "and balance. This can help lower your risk of falling.     Practice fall safety and prevention.    Wear low-heeled shoes that fit well and give your feet good support. Talk to your doctor if you have foot problems that make this hard.  Carry a cellphone or wear a medical alert device that you can use to call for help.  Use stepladders instead of chairs to reach high objects. Don't climb if you're at risk for falls. Ask for help, if needed.  Wear the correct eyeglasses, if you need them.    Make your home safer.    Remove rugs, cords, clutter, and furniture from walkways.  Keep your house well lit. Use night-lights in hallways and bathrooms.  Install and use sturdy handrails on stairways.  Wear nonskid footwear, even inside. Don't walk barefoot or in socks without shoes.    Be safe outside.    Use handrails, curb cuts, and ramps whenever possible.  Keep your hands free by using a shoulder bag or backpack.  Try to walk in well-lit areas. Watch out for uneven ground, changes in pavement, and debris.  Be careful in the winter. Walk on the grass or gravel when sidewalks are slippery. Use de-icer on steps and walkways. Add non-slip devices to shoes.    Put grab bars and nonskid mats in your shower or tub and near the toilet. Try to use a shower chair or bath bench when bathing.   Get into a tub or shower by putting in your weaker leg first. Get out with your strong side first. Have a phone or medical alert device in the bathroom with you.   Where can you learn more?  Go to https://www.[employer].net/patiented  Enter G117 in the search box to learn more about \"Preventing Falls: Care Instructions.\"  Current as of: July 17, 2023               Content Version: 14.0    1901-7205 [employer].   Care instructions adapted under license by your healthcare professional. If you have questions about a medical condition or this instruction, always ask your healthcare professional. [employer] disclaims any " warranty or liability for your use of this information.           Advance Directives: Care Instructions  Overview  An advance directive is a legal way to state your wishes at the end of your life. It tells your family and your doctor what to do if you can't say what you want.  There are two main types of advance directives. You can change them any time your wishes change.  Living will.  This form tells your family and your doctor your wishes about life support and other treatment. The form is also called a declaration. [place] has very easy forms for this process, that are available at the clinic!!  Medical power of .  This form lets you name a person to make treatment decisions for you when you can't speak for yourself. This person is called a health care agent (health care proxy, health care surrogate). The form is also called a durable power of  for health care.  If you do not have an advance directive, decisions about your medical care may be made by a family member, or by a doctor or a  who doesn't know you.  It may help to think of an advance directive as a gift to the people who care for you. If you have one, they won't have to make tough decisions by themselves.  For more information, including forms for your state, see the [employer] website (www.caringinfo.org/planning/advance-directives/).  Follow-up care is a key part of your treatment and safety. Be sure to make and go to all appointments, and call your doctor if you are having problems. It's also a good idea to know your test results and keep a list of the medicines you take.  What should you include in an advance directive?  Many states have a unique advance directive form. (It may ask you to address specific issues.) Or you might use a universal form that's approved by many states.  If your form doesn't tell you what to address, it may be hard to know what to include in your advance directive. Use the questions below to help  "you get started.  Who do you want to make decisions about your medical care if you are not able to?  What life-support measures do you want if you have a serious illness that gets worse over time or can't be cured?  What are you most afraid of that might happen? (Maybe you're afraid of having pain, losing your independence, or being kept alive by machines.)  Where would you prefer to die? (Your home? A hospital? A nursing home?)  Do you want to donate your organs when you die?  Do you want certain Cheondoism practices performed before you die?  When should you call for help?  Be sure to contact your doctor if you have any questions.  Where can you learn more?    Please send us a [employer] message or call the clinic for more information [phone], as we can help you with your planning.     Also, go to [email or web address]/patiented  Enter R264 in the search box to learn more about \"Advance Directives: Care Instructions.\"  Current as of: November 16, 2023               Content Version: 14.0    1158-1348 [employer].   Care instructions adapted under license by your healthcare professional. If you have questions about a medical condition or this instruction, always ask your healthcare professional. [employer] disclaims any warranty or liability for your use of this information.   "

## 2024-09-05 NOTE — RESULT ENCOUNTER NOTE
Aaron Marks, there is a significant amount of arthritis in your back.  There is some evidence that your spine has moved slightly.  At this point I would continue your current plan, and if you are not improved we will continue your workup.  Please reach out with questions.    Sincerely,   Naima Petty DNP

## 2024-09-05 NOTE — RESULT ENCOUNTER NOTE
Aaron Marks, your CKD is holding stable.  Continue medication plan.  Will work to get that blood pressure under control.  Other labs in good range.  Continue plan.    Sincerely,   Naima Petty, DNP

## 2024-09-23 ENCOUNTER — ALLIED HEALTH/NURSE VISIT (OUTPATIENT)
Dept: FAMILY MEDICINE | Facility: CLINIC | Age: 74
End: 2024-09-23
Payer: MEDICARE

## 2024-09-23 VITALS
WEIGHT: 215 LBS | SYSTOLIC BLOOD PRESSURE: 127 MMHG | BODY MASS INDEX: 31.84 KG/M2 | TEMPERATURE: 96.9 F | DIASTOLIC BLOOD PRESSURE: 82 MMHG | HEART RATE: 68 BPM | RESPIRATION RATE: 16 BRPM | HEIGHT: 69 IN | OXYGEN SATURATION: 95 %

## 2024-09-23 DIAGNOSIS — N18.2 CKD (CHRONIC KIDNEY DISEASE) STAGE 2, GFR 60-89 ML/MIN: ICD-10-CM

## 2024-09-23 DIAGNOSIS — Z01.30 BP CHECK: Primary | ICD-10-CM

## 2024-09-23 DIAGNOSIS — I10 ESSENTIAL HYPERTENSION WITH GOAL BLOOD PRESSURE LESS THAN 130/80: ICD-10-CM

## 2024-09-23 PROCEDURE — 99207 PR NO CHARGE NURSE ONLY: CPT

## 2024-09-23 ASSESSMENT — PAIN SCALES - GENERAL: PAINLEVEL: NO PAIN (0)

## 2024-09-23 NOTE — PROGRESS NOTES
"Kendrick Castellon is a 74 year old patient who comes in today for a Blood Pressure check.  Initial BP:  /82 (BP Location: Right arm, Patient Position: Sitting, Cuff Size: Adult Large)   Pulse 68   Temp 96.9  F (36.1  C) (Temporal)   Resp 16   Ht 1.755 m (5' 9.09\")   Wt 97.5 kg (215 lb)   SpO2 95%   BMI 31.67 kg/m       68  Disposition: follow-up as previously indicated by provider   "

## 2024-12-02 ENCOUNTER — OFFICE VISIT (OUTPATIENT)
Dept: FAMILY MEDICINE | Facility: CLINIC | Age: 74
End: 2024-12-02
Payer: MEDICARE

## 2024-12-02 VITALS
RESPIRATION RATE: 14 BRPM | WEIGHT: 211.5 LBS | BODY MASS INDEX: 31.32 KG/M2 | SYSTOLIC BLOOD PRESSURE: 122 MMHG | DIASTOLIC BLOOD PRESSURE: 70 MMHG | OXYGEN SATURATION: 97 % | HEART RATE: 55 BPM | TEMPERATURE: 97.2 F | HEIGHT: 69 IN

## 2024-12-02 DIAGNOSIS — N18.2 CKD (CHRONIC KIDNEY DISEASE) STAGE 2, GFR 60-89 ML/MIN: ICD-10-CM

## 2024-12-02 DIAGNOSIS — I25.10 CORONARY ARTERY DISEASE INVOLVING NATIVE CORONARY ARTERY OF NATIVE HEART WITHOUT ANGINA PECTORIS: ICD-10-CM

## 2024-12-02 DIAGNOSIS — I10 ESSENTIAL HYPERTENSION WITH GOAL BLOOD PRESSURE LESS THAN 130/80: Primary | ICD-10-CM

## 2024-12-02 LAB
ANION GAP SERPL CALCULATED.3IONS-SCNC: 9 MMOL/L (ref 7–15)
BUN SERPL-MCNC: 20 MG/DL (ref 8–23)
CALCIUM SERPL-MCNC: 10.2 MG/DL (ref 8.8–10.4)
CHLORIDE SERPL-SCNC: 103 MMOL/L (ref 98–107)
CREAT SERPL-MCNC: 1.21 MG/DL (ref 0.67–1.17)
EGFRCR SERPLBLD CKD-EPI 2021: 63 ML/MIN/1.73M2
GLUCOSE SERPL-MCNC: 101 MG/DL (ref 70–99)
HCO3 SERPL-SCNC: 26 MMOL/L (ref 22–29)
POTASSIUM SERPL-SCNC: 5.2 MMOL/L (ref 3.4–5.3)
SODIUM SERPL-SCNC: 138 MMOL/L (ref 135–145)

## 2024-12-02 PROCEDURE — 80048 BASIC METABOLIC PNL TOTAL CA: CPT | Performed by: NURSE PRACTITIONER

## 2024-12-02 PROCEDURE — 36415 COLL VENOUS BLD VENIPUNCTURE: CPT | Performed by: NURSE PRACTITIONER

## 2024-12-02 PROCEDURE — 99214 OFFICE O/P EST MOD 30 MIN: CPT | Performed by: NURSE PRACTITIONER

## 2024-12-02 PROCEDURE — G2211 COMPLEX E/M VISIT ADD ON: HCPCS | Performed by: NURSE PRACTITIONER

## 2024-12-02 RX ORDER — LISINOPRIL 40 MG/1
40 TABLET ORAL DAILY
Qty: 90 TABLET | Refills: 3 | Status: SHIPPED | OUTPATIENT
Start: 2024-12-02

## 2024-12-02 ASSESSMENT — PAIN SCALES - GENERAL: PAINLEVEL_OUTOF10: NO PAIN (0)

## 2024-12-02 NOTE — PROGRESS NOTES
Assessment & Plan     Essential hypertension with goal blood pressure less than 130/80  HTN- Controlled, continue plan.  Updating medication refills and labs as necessary.  Continue healthy habits.  - lisinopril (ZESTRIL) 40 MG tablet; Take 1 tablet (40 mg) by mouth daily.    CKD (chronic kidney disease) stage 2, GFR 60-89 ml/min  -Stable, continue plan.  Medication refills provided as necessary.  Lab monitoring if needed/considered.  - empagliflozin (JARDIANCE) 10 MG TABS tablet; Take 1 tablet (10 mg) by mouth daily.  - lisinopril (ZESTRIL) 40 MG tablet; Take 1 tablet (40 mg) by mouth daily.  - Basic metabolic panel; Future  - Basic metabolic panel    Coronary artery disease involving native coronary artery of native heart without angina pectoris  -Stable, continue plan.  Medication refills provided as necessary.  Lab monitoring if needed/considered.  - empagliflozin (JARDIANCE) 10 MG TABS tablet; Take 1 tablet (10 mg) by mouth daily.  - Adult Cardiology Eval [facility] Referral; Future            MEDICATIONS:  Continue current medications without change    The longitudinal plan of care for the diagnosis(es)/condition(s) as documented were addressed during this visit. Due to the added complexity in care, I will continue to support Kendrick in the subsequent management and with ongoing continuity of care.    Subjective   Kendrick is a 74 year old, presenting for the following health issues:  Recheck Medication      12/2/2024     9:09 AM   Additional Questions   Roomed by AG   Accompanied by self     History of Present Illness       Hypertension: He presents for follow up of hypertension.  He does not check blood pressure  regularly outside of the clinic. Outside blood pressures have been over 140/90. He does not follow a low salt diet.     He eats 0-1 servings of fruits and vegetables daily.He consumes 0 sweetened beverage(s) daily.He exercises with enough effort to increase his heart rate 30 to 60 minutes per day.  He  "exercises with enough effort to increase his heart rate 6 days per week.   He is taking medications regularly.       Added Jardiance and lisinopril adjustment due to CKD and HX of LE symptoms/claudication and CV protection, due to HX of CAD.   No cardiac symptoms.          Medication Followup of jardiance and lisinopril  Taking Medication as prescribed: yes  Side Effects:  None  Medication Helping Symptoms:  yes      Is able to do some walking.   Has lost weight since last visit as well. Feeling better with this.       Review of Systems  Constitutional, HEENT, cardiovascular, pulmonary, gi and gu systems are negative, except as otherwise noted.      Objective    /70   Pulse 55   Temp 97.2  F (36.2  C) (Temporal)   Resp 14   Ht 1.75 m (5' 8.9\")   Wt 95.9 kg (211 lb 8 oz)   SpO2 97%   BMI 31.33 kg/m    Body mass index is 31.33 kg/m .  Physical Exam   GENERAL: alert and no distress  NECK: no adenopathy, no asymmetry, masses, or scars  RESP: lungs clear to auscultation - no rales, rhonchi or wheezes  CV: regular rates and rhythm, normal S1 S2, no S3 or S4, no murmur, click or rub, and no peripheral edema  MS: no gross musculoskeletal defects noted, no edema  NEURO: Normal strength and tone, mentation intact and speech normal    Results for orders placed or performed in visit on 12/02/24   Basic metabolic panel     Status: Abnormal   Result Value Ref Range    Sodium 138 135 - 145 mmol/L    Potassium 5.2 3.4 - 5.3 mmol/L    Chloride 103 98 - 107 mmol/L    Carbon Dioxide (CO2) 26 22 - 29 mmol/L    Anion Gap 9 7 - 15 mmol/L    Urea Nitrogen 20.0 8.0 - 23.0 mg/dL    Creatinine 1.21 (H) 0.67 - 1.17 mg/dL    GFR Estimate 63 >60 mL/min/1.73m2    Calcium 10.2 8.8 - 10.4 mg/dL    Glucose 101 (H) 70 - 99 mg/dL           Signed Electronically by: JOSE Vallejo CNP    "

## 2025-03-31 ENCOUNTER — APPOINTMENT (OUTPATIENT)
Dept: MRI IMAGING | Facility: CLINIC | Age: 75
End: 2025-03-31
Attending: PHYSICIAN ASSISTANT
Payer: MEDICARE

## 2025-03-31 ENCOUNTER — HOSPITAL ENCOUNTER (EMERGENCY)
Facility: CLINIC | Age: 75
Discharge: HOME | End: 2025-03-31
Attending: STUDENT IN AN ORGANIZED HEALTH CARE EDUCATION/TRAINING PROGRAM | Admitting: STUDENT IN AN ORGANIZED HEALTH CARE EDUCATION/TRAINING PROGRAM
Payer: MEDICARE

## 2025-03-31 VITALS
BODY MASS INDEX: 32.27 KG/M2 | TEMPERATURE: 98.3 F | SYSTOLIC BLOOD PRESSURE: 148 MMHG | OXYGEN SATURATION: 95 % | WEIGHT: 217.9 LBS | DIASTOLIC BLOOD PRESSURE: 82 MMHG | RESPIRATION RATE: 18 BRPM | HEIGHT: 69 IN | HEART RATE: 58 BPM

## 2025-03-31 DIAGNOSIS — H47.019: ICD-10-CM

## 2025-03-31 LAB
ANION GAP SERPL CALCULATED.3IONS-SCNC: 15 MMOL/L (ref 7–15)
BUN SERPL-MCNC: 14.4 MG/DL (ref 8–23)
CALCIUM SERPL-MCNC: 10 MG/DL (ref 8.8–10.4)
CHLORIDE SERPL-SCNC: 105 MMOL/L (ref 98–107)
CREAT SERPL-MCNC: 1.21 MG/DL (ref 0.67–1.17)
CRP SERPL-MCNC: <3 MG/L
EGFRCR SERPLBLD CKD-EPI 2021: 63 ML/MIN/1.73M2
ERYTHROCYTE [DISTWIDTH] IN BLOOD BY AUTOMATED COUNT: 12.9 % (ref 10–15)
ERYTHROCYTE [SEDIMENTATION RATE] IN BLOOD BY WESTERGREN METHOD: 17 MM/HR (ref 0–20)
GLUCOSE SERPL-MCNC: 97 MG/DL (ref 70–99)
HCO3 SERPL-SCNC: 22 MMOL/L (ref 22–29)
HCT VFR BLD AUTO: 44.6 % (ref 40–53)
HGB BLD-MCNC: 14.9 G/DL (ref 13.3–17.7)
MCH RBC QN AUTO: 29.5 PG (ref 26.5–33)
MCHC RBC AUTO-ENTMCNC: 33.4 G/DL (ref 31.5–36.5)
MCV RBC AUTO: 88 FL (ref 78–100)
PLATELET # BLD AUTO: 219 10E3/UL (ref 150–450)
POTASSIUM SERPL-SCNC: 4.4 MMOL/L (ref 3.4–5.3)
RBC # BLD AUTO: 5.05 10E6/UL (ref 4.4–5.9)
SODIUM SERPL-SCNC: 142 MMOL/L (ref 135–145)
WBC # BLD AUTO: 12.6 10E3/UL (ref 4–11)

## 2025-03-31 PROCEDURE — 82310 ASSAY OF CALCIUM: CPT

## 2025-03-31 PROCEDURE — 99285 EMERGENCY DEPT VISIT HI MDM: CPT | Mod: 25 | Performed by: STUDENT IN AN ORGANIZED HEALTH CARE EDUCATION/TRAINING PROGRAM

## 2025-03-31 PROCEDURE — 70553 MRI BRAIN STEM W/O & W/DYE: CPT

## 2025-03-31 PROCEDURE — 255N000002 HC RX 255 OP 636: Performed by: PHYSICIAN ASSISTANT

## 2025-03-31 PROCEDURE — 86140 C-REACTIVE PROTEIN: CPT

## 2025-03-31 PROCEDURE — 80048 BASIC METABOLIC PNL TOTAL CA: CPT

## 2025-03-31 PROCEDURE — 85652 RBC SED RATE AUTOMATED: CPT

## 2025-03-31 PROCEDURE — 36415 COLL VENOUS BLD VENIPUNCTURE: CPT

## 2025-03-31 PROCEDURE — 99285 EMERGENCY DEPT VISIT HI MDM: CPT | Performed by: STUDENT IN AN ORGANIZED HEALTH CARE EDUCATION/TRAINING PROGRAM

## 2025-03-31 PROCEDURE — A9585 GADOBUTROL INJECTION: HCPCS | Performed by: PHYSICIAN ASSISTANT

## 2025-03-31 PROCEDURE — 85014 HEMATOCRIT: CPT

## 2025-03-31 RX ORDER — GADOBUTROL 604.72 MG/ML
9.8 INJECTION INTRAVENOUS ONCE
Status: COMPLETED | OUTPATIENT
Start: 2025-03-31 | End: 2025-03-31

## 2025-03-31 RX ADMIN — GADOBUTROL 9.8 ML: 604.72 INJECTION INTRAVENOUS at 18:24

## 2025-03-31 ASSESSMENT — VISUAL ACUITY
OD: 20/30;WITH CORRECTIVE LENSES
OD: 20/30;WITH CORRECTIVE LENSES
OU: NORMAL
OS: WITH CORRECTIVE LENSES;20/30
OS: WITH CORRECTIVE LENSES;20/30

## 2025-03-31 ASSESSMENT — COLUMBIA-SUICIDE SEVERITY RATING SCALE - C-SSRS
2. HAVE YOU ACTUALLY HAD ANY THOUGHTS OF KILLING YOURSELF IN THE PAST MONTH?: NO
6. HAVE YOU EVER DONE ANYTHING, STARTED TO DO ANYTHING, OR PREPARED TO DO ANYTHING TO END YOUR LIFE?: NO
1. IN THE PAST MONTH, HAVE YOU WISHED YOU WERE DEAD OR WISHED YOU COULD GO TO SLEEP AND NOT WAKE UP?: NO

## 2025-03-31 ASSESSMENT — ACTIVITIES OF DAILY LIVING (ADL)
ADLS_ACUITY_SCORE: 41

## 2025-03-31 NOTE — ED PROVIDER NOTES
"ED Provider Note  [facility]      History     Chief Complaint   Patient presents with    Vision Loss Left Eye     2-3 weeks ago began with blurring of the peripheral vision. Worsening since 2-3 weeks ago. Blurriness is now in entire visual field in left eye. Referred from eye clinic for emergent optho consult.      HPI  74yo M pmhx CAD s/p CABG, HTN, HLD presents from outside optometry clinic for ophthalmology consult d/t subacute, progressive department vision deficit of left eye x 3 weeks.  Patient reports symptoms began with blurring of his left lateral peripheral vision, progressing to include his inferior visual field, and has been then increasing superiorly.  No pain, discharge, prior ophthalmologic history/surgery, right eye involvement.  No associated HA, slurred speech, facial droop, extremity paresthesias or weakness.          Physical Exam   BP: (!) 179/79  Pulse: 74  Temp: 98.3  F (36.8  C)  Resp: 13  Height: 175.3 cm (5' 9\")  Weight: 98.8 kg (217 lb 14.4 oz)  SpO2: 96 %  Physical Exam  Constitutional:       General: He is not in acute distress.     Appearance: Normal appearance. He is not toxic-appearing.   HENT:      Head: Atraumatic.   Eyes:      Conjunctiva/sclera: Conjunctivae normal.      Right eye: Right conjunctiva is not injected. No chemosis, exudate or hemorrhage.     Left eye: Left conjunctiva is not injected. No chemosis, exudate or hemorrhage.     Comments: Corrected visual acuity 20/30 bilaterally   Cardiovascular:      Rate and Rhythm: Normal rate.   Pulmonary:      Effort: Pulmonary effort is normal.   Musculoskeletal:      Cervical back: Neck supple.   Neurological:      Mental Status: He is alert.           ED Course, Procedures, & Data      Procedures                Results for orders placed or performed during the hospital encounter of 03/31/25   MR Brain and Orbits w/o & w Contrast     Status: None    Narrative    EXAM: MR BRAIN AND ORBITS W/O and W " CONTRAST  LOCATION: [facility]  DATE: 3/31/2025    INDICATION: Unilateral (left) vision changes  COMPARISON: None available at time dictation.  CONTRAST: 9.8 mL Gadavist  TECHNIQUE:  1) Routine multiplanar multisequence head MRI without and with intravenous contrast.  2) Dedicated high-resolution multiplanar multisequence MRI of the orbits without and with intravenous contrast.    FINDINGS:  HEAD MRI:  INTRACRANIAL CONTENTS: No acute or subacute infarct. No mass, acute hemorrhage, or extra-axial fluid collections. Scattered nonspecific T2/FLAIR hyperintensities within the cerebral white matter most consistent with mild chronic microvascular ischemic   change. No pathologic enhancement. Mild generalized cerebral volume loss. No hydrocephalus. Normal position of the cerebellar tonsils.    SELLA: No abnormality accounting for technique.    OSSEOUS STRUCTURES/SOFT TISSUES: Normal marrow signal. The major intracranial vascular flow voids are maintained.     SINUSES/MASTOIDS: No paranasal sinus mucosal disease. No middle ear or mastoid effusion.     ORBIT MRI: Dedicated MRI of the orbits was performed. Intact globes. Symmetrical extraocular musculature without thickening/enlargement. The intraorbital, canalicular and prechiasmatic optic nerve segments are normal in size and signal intensity   bilaterally. The optic chiasm and proximal optic tracts are unremarkable. No localized inflammation of the intraconal or extraconal orbital fat. Normal lacrimal glands. No intraorbital mass or pathologic intraorbital enhancement.       Impression    IMPRESSION:    HEAD MRI:  1.  Senescent intracranial changes and sequelae of mild chronic microangiopathy without acute intracranial abnormality.    ORBIT MRI:  1.  Unremarkable orbits   Erythrocyte sedimentation rate auto     Status: Normal   Result Value Ref Range    Erythrocyte Sedimentation Rate 17 0 - 20 mm/hr   CRP inflammation      Status: Normal   Result Value Ref Range    CRP Inflammation <3.00 <5.00 mg/L   CBC with platelets     Status: Abnormal   Result Value Ref Range    WBC Count 12.6 (H) 4.0 - 11.0 10e3/uL    RBC Count 5.05 4.40 - 5.90 10e6/uL    Hemoglobin 14.9 13.3 - 17.7 g/dL    Hematocrit 44.6 40.0 - 53.0 %    MCV 88 78 - 100 fL    MCH 29.5 26.5 - 33.0 pg    MCHC 33.4 31.5 - 36.5 g/dL    RDW 12.9 10.0 - 15.0 %    Platelet Count 219 150 - 450 10e3/uL   Basic metabolic panel     Status: Abnormal   Result Value Ref Range    Sodium 142 135 - 145 mmol/L    Potassium 4.4 3.4 - 5.3 mmol/L    Chloride 105 98 - 107 mmol/L    Carbon Dioxide (CO2) 22 22 - 29 mmol/L    Anion Gap 15 7 - 15 mmol/L    Urea Nitrogen 14.4 8.0 - 23.0 mg/dL    Creatinine 1.21 (H) 0.67 - 1.17 mg/dL    GFR Estimate 63 >60 mL/min/1.73m2    Calcium 10.0 8.8 - 10.4 mg/dL    Glucose 97 70 - 99 mg/dL     Medications   gadobutrol (GADAVIST) injection 9.8 mL (9.8 mLs Intravenous $Given 3/31/25 4246)     Labs Ordered and Resulted from Time of ED Arrival to Time of ED Departure   CBC WITH PLATELETS - Abnormal       Result Value    WBC Count 12.6 (*)     RBC Count 5.05      Hemoglobin 14.9      Hematocrit 44.6      MCV 88      MCH 29.5      MCHC 33.4      RDW 12.9      Platelet Count 219     BASIC METABOLIC PANEL - Abnormal    Sodium 142      Potassium 4.4      Chloride 105      Carbon Dioxide (CO2) 22      Anion Gap 15      Urea Nitrogen 14.4      Creatinine 1.21 (*)     GFR Estimate 63      Calcium 10.0      Glucose 97     ERYTHROCYTE SEDIMENTATION RATE AUTO - Normal    Erythrocyte Sedimentation Rate 17     CRP INFLAMMATION - Normal    CRP Inflammation <3.00       MR Brain and Orbits w/o & w Contrast   Final Result   IMPRESSION:      HEAD MRI:   1.  Senescent intracranial changes and sequelae of mild chronic microangiopathy without acute intracranial abnormality.      ORBIT MRI:   1.  Unremarkable orbits             Critical care was not performed.     Medical Decision  Making  The patient's presentation was of high complexity (an acute health issue posing potential threat to life or bodily function).    The patient's evaluation involved:  review of external note(s) from 3+ sources (see separate area of note for details)  ordering and/or review of 3+ test(s) in this encounter (see separate area of note for details)  discussion of management or test interpretation with another health professional (see separate area of note for details)    The patient's management necessitated only low risk treatment.    Assessment & Plan    76yo M pmhx CAD s/p CABG, HTN, HLD presents from outside optometry clinic for ophthalmology consult for non-arteritic anterior ischemic optic neuropathy I/s/o subacute, progressive vision deficit of left eye x 3 weeks, without neuro symptoms or prior ophthalmologic history.  At present, patient remains dilated, but gross ophthalmologic exam otherwise unremarkable.  Corrected visual acuity 20/30 b/l. Pressures wnl in clinic.     Ophthalmology consulted here, requested inflammatory markers to rule out GCA, which were WNL.  Further recommended MRI brain and orbits which were acutely negative.  Patient's ophthalmologic exam and history consistent with nonarteritic anterior ischemic optic neuropathy as per ophthalmology. There is no treatment for this.  Patient cleared for discharge from ophthalmology standpoint, with plan for follow-up in their clinic in 4 to 6 weeks.  ER return precautions given for worsening symptoms.      I have reviewed the nursing notes. I have reviewed the findings, diagnosis, plan and need for follow up with the patient.    New Prescriptions    No medications on file       Final diagnoses:   Non-arteritic anterior ischemic optic neuropathy         Warren Carey PA-C  [facility] EMERGENCY DEPARTMENT  3/31/2025     Warren Carey PA-C  03/31/25 1933

## 2025-03-31 NOTE — ED TRIAGE NOTES
2-3 weeks ago began with blurring of the peripheral vision. Worsening since 2-3 weeks ago. Blurriness is now in entire visual field in left eye. Referred from eye clinic for emergent optho consult.          Triage Assessment (Adult)       Row Name 03/31/25 1412          Triage Assessment    Airway WDL WDL        Respiratory WDL    Respiratory WDL WDL        Skin Circulation/Temperature WDL    Skin Circulation/Temperature WDL WDL        Cardiac WDL    Cardiac WDL WDL        Peripheral/Neurovascular WDL    Peripheral Neurovascular WDL WDL        Cognitive/Neuro/Behavioral WDL    Cognitive/Neuro/Behavioral WDL WDL

## 2025-03-31 NOTE — CONSULTS
OPHTHALMOLOGY CONSULT NOTE  03/31/25    Patient: Kendrick Castellon  Reason for Consult: 3 weeks decreased peripheral and [facility] left eye    ASSESSMENT/PLAN:     Kendrick Castellon is a 75 year old male who presents with     #NAION, Left Eye  -History of CAD s/p bypass, HTN, HLD, CKD Stage 2, and ED (last sildenafil February 2025), small C/D ratio 0.2  -Patient denies headache, scalp tenderness, jaw claudication, fever, fatigue, unintended weight loss, double vision, vision loss, muscle pain in shoulders.  -ESR/CRP/Platelets WNL  -Exam showed no temporal tenderness nor headache. Temporal artery is pulsatile without gross palpable thickening.  -Central vision preserved, tr rAPD (patient partially dilated by prior optometry visit). Color vision symmetric. Mild far peripheral visual field deficit to confrontation.  -DFE notable for Grade 3 optic nerve edema left eye only  -None smoker  -Symptoms generally stable over the past three weeks ?progress in loss v. Patient more attentive to symptoms once they were recognized  -MRI brain and orbit w/ and w/o unremarkable.    RECOMMENDATIONS:  -Follow up with PCP to optimize HTN, HLD, further investigate sleep apnea, treat if indicated  -Advised increased risk of NAION with use of sildenafil/PDE5 inhibitors - recommended considering discontinuation  -Advised follow up with PCP to notify of intention to discontinue use of jardiance, and follow on cares that may be indicated.  -Will further investigate alleged link between jardiance and NAION and provide additional recommendations to patient at follow up appointment.  -Follow up in 4-6 weeks with neuro ophthalmology, return sooner with worsening symptoms, ophthalmology will call to coordinate.  -Safe for discharge from an ophthalmology perspective    It is our pleasure to participate in this patient's care and treatment. Ophthalmology will continue to follow.  Please contact us with any further questions or concerns.      Patient discussed  with Dr. Owens.    Jamaal Bolanos MD  Resident Physician, PGY-2  Department of Ophthalmology    HISTORY OF PRESENTING ILLNESS:     Kendrick Castellon is a 75 year old male with PMH of who CAD s/p bypass, HTN, HLD, CKD Stage 2, and ED (last sildenafil February 2025). Presents to ED on recommendation from his optometrist who noted unilateral optic nerve edema and visual field changes today in clinic. Patient notes progressive vision loss/peripheral blurring in his left eye, first noticed in the lateral field of view with difficulty merging into traffic. Unclear from hx if symptoms have actually been progressive or more noticed to patient once they were appreciated.    Visual field image from optometry clinic below, demonstrating lid artifact right eye and preserved central vision left eye. OCT RNFL from outside clinic with central thickness 117 microns right eye and 249 microns left eye.    Patient denies recent unexplained fatigue, fevers, weight loss, joint pain or stiffness, new headaches, scalp tenderness, jaw claudication (tiredness with talking or chewing), or double vision.     Hx of viagra use, but has not used it in March.    Per patient and discussion with optometrist, optometrist recently read article in [facility] identifying jardiance as an independent risk factor for NAION and advised patient to stop taking jardiance.  Linked article in optometry note directs to a study on NAION risk in patient's taking semaglutide. Unable to idetify study optometrist was referring to.     Review of systems were otherwise negative except for that which has been stated above.    OCULAR/MEDICAL/SURGICAL HISTORIES:   Past Ocular History:  Last eye exam: January 2025  Prior eye surgery/laser: None  Contact lens wear: No  Glasses: Progressive lenses  Eyedrops: None    Pertinent Systemic Medications:   No current facility-administered medications for this encounter.     Current Outpatient Medications   Medication Sig Dispense  Refill    aspirin 81 MG tablet Take 1 tablet by mouth daily.  3    empagliflozin (JARDIANCE) 10 MG TABS tablet Take 1 tablet (10 mg) by mouth daily. 90 tablet 2    ezetimibe (ZETIA) 10 MG tablet Take 1 tablet (10 mg) by mouth daily. 90 tablet 3    LANsoprazole (PREVACID) 15 MG DR capsule Take 15 mg by mouth daily.      lisinopril (ZESTRIL) 40 MG tablet Take 1 tablet (40 mg) by mouth daily. 90 tablet 3    metoprolol succinate ER (TOPROL XL) 25 MG 24 hr tablet Take 1/2 tablet (12.5 mg) by mouth daily 45 tablet 3    rosuvastatin (CRESTOR) 40 MG tablet Take 1 tablet (40 mg) by mouth daily. 90 tablet 3    sildenafil (VIAGRA) 100 MG tablet Take 1/2-1 tablets ( mg) by mouth daily as needed (ED) Take 30 min to 4 hours before intercourse.  Never use with nitroglycerin, terazosin or doxazosin. 36 tablet 1      Past Medical History:  Past Medical History:   Diagnosis Date    CKD (chronic kidney disease) stage 2, GFR 60-89 ml/min 11/12/2013    Coronary artery disease involving native coronary artery of native heart without angina pectoris 12/7/2015    Erectile dysfunction, unspecified erectile dysfunction type 11/30/2015    Essential hypertension with goal blood pressure less than 130/80 11/17/2017    GERD (gastroesophageal reflux disease) 4/5/2012    Hyperlipidemia LDL goal <100 4/5/2012    Hypertension goal BP (blood pressure) < 140/90 4/5/2012     Past Surgical History:   Past Surgical History:   Procedure Laterality Date    CARDIAC SURGERY  12/05/2002    CABG x 3: STATON to LAD, left radial to circ & right radial to RCA    COLONOSCOPY  4/2001    reported as normal--10 year follow up    COLONOSCOPY  5/10/2012    Procedure:COLONOSCOPY; screening colonoscopy; Surgeon:REN MAR; Location:[facility]    COLONOSCOPY N/A 5/4/2022    Procedure: COLONOSCOPY, FLEXIBLE, WITH LESION REMOVAL USING SNARE;  Surgeon: Jamaal Schultz MD;  Location: [facility]    VASECTOMY       Family History:  No history of macular  degeneration or glaucoma    Social History:  Social Drivers of Health     Food Insecurity: Low Risk  (9/5/2024)    Food Insecurity     Within the past 12 months, did you worry that your food would run out before you got money to buy more?: No     Within the past 12 months, did the food you bought just not last and you didn t have money to get more?: No   Depression: Not at risk (9/5/2024)    PHQ-2     PHQ-2 Score: 0   Housing Stability: Low Risk  (9/5/2024)    Housing Stability     Do you have housing? : Yes     Are you worried about losing your housing?: No   Tobacco Use: Medium Risk (3/31/2025)    Patient History     Smoking Tobacco Use: Former     Smokeless Tobacco Use: Never     Passive Exposure: Past   Financial Resource Strain: Low Risk  (9/5/2024)    Financial Resource Strain     Within the past 12 months, have you or your family members you live with been unable to get utilities (heat, electricity) when it was really needed?: No   Alcohol Use: Not on file   Transportation Needs: Low Risk  (9/5/2024)    Transportation Needs     Within the past 12 months, has lack of transportation kept you from medical appointments, getting your medicines, non-medical meetings or appointments, work, or from getting things that you need?: No   Physical Activity: Unknown (9/5/2024)    Exercise Vital Sign     Days of Exercise per Week: 5 days     Minutes of Exercise per Session: Not on file   Interpersonal Safety: Low Risk  (9/5/2024)    Interpersonal Safety     Do you feel physically and emotionally safe where you currently live?: Yes     Within the past 12 months, have you been hit, slapped, kicked or otherwise physically hurt by someone?: No     Within the past 12 months, have you been humiliated or emotionally abused in other ways by your partner or ex-partner?: No   Stress: No Stress Concern Present (9/5/2024)    [employer] [place] - Occupational Stress Questionnaire     Feeling of Stress : Not at all    Social Connections: Unknown (9/5/2024)    Social Connection and Isolation Panel [NHANES]     Frequency of Communication with Friends and Family: Not on file     Frequency of Social Gatherings with Friends and Family: Once a week     Attends Jewish Services: Not on file     Active Member of Clubs or Organizations: Not on file     Attends Club or Organization Meetings: Not on file     Marital Status: Not on file   Health Literacy: Not on file       EXAMINATION:     Base Eye Exam       Visual Acuity (Snellen - Linear)         Right Left    Near sc 20/50 20/50    Near cc 20/25 20/30-1   [facility]  Right eye: 20/25+2 CC at distance  Left eye: 20/30+2 at distance    S/p dilation  SC: with bifocals  CC: with bifocal and PH             Tonometry (Tonopen, 3:32 PM)         Right Left    Pressure 20 20              Pupils         Dark Light Shape React APD    Right 7 5 Round Brisk None    Left 7 6 Round Slower Yes by reverse   Pharm dilated at optometry office. Optometry notes PERRL each eye w/o APD             Visual Fields         Left Right      Full    Restrictions Partial outer superior temporal, inferior temporal, superior nasal, inferior nasal deficiencies    Patient notes peripheral vision much less clear in left eye             Extraocular Movement         Right Left     Full, Ortho Full, Ortho              Neuro/Psych       Oriented x3: Yes    Mood/Affect: Normal              Dilation       Both eyes: Dilated on arrival @ 3:21 PM                  Slit Lamp and Fundus Exam       External Exam         Right Left    External Normal Normal              Slit Lamp Exam         Right Left    Lids/Lashes Dermatochalasis - upper lid Dermatochalasis - upper lid    Conjunctiva/Sclera White and quiet White and quiet    Cornea Clear Clear    Anterior Chamber Deep and quiet Deep and quiet    [facility] Dilated Pharm Dilated    Lens 2+ NS, cortical changes extending into visual axis, 1+ PSC 2+ NS, tr cortical  changes, 1+ PSC    Anterior Vitreous Normal aurelia negative, PVD Normal aurelia Negative              Fundus Exam         Right Left    Disc Normal Grade 3 optic nerve edema with 360 edema and obscuration of minor but no major vessels    C/D Ratio 0.2     Macula Normal Normal    Vessels Normal Normal    Periphery Normal Normal                    Labs/Studies/Imaging Performed:  ESR: 17  CRP: < 3.00  CBC: Slight leucocytosis 12.6 consistent with patient's recent illness. Normal platelets  BMP: Stable Cr: 1.21    MR Brain and Orbits w/o & w Contrast (03/31/2025 6:52 PM)   IMPRESSION:     HEAD MRI:  1.  Senescent intracranial changes and sequelae of mild chronic microangiopathy without acute intracranial abnormality.     ORBIT MRI:  1.  Unremarkable orbits    Outside Clinic Visual Fields            Jamaal Bolanos MD  Resident Physician, PGY-2  Department of Ophthalmology  March 31, 2025

## 2025-04-01 ENCOUNTER — TELEPHONE (OUTPATIENT)
Dept: OPHTHALMOLOGY | Facility: CLINIC | Age: 75
End: 2025-04-01
Payer: MEDICARE

## 2025-04-10 ENCOUNTER — OFFICE VISIT (OUTPATIENT)
Dept: OPHTHALMOLOGY | Facility: CLINIC | Age: 75
End: 2025-04-10
Attending: OPHTHALMOLOGY
Payer: MEDICARE

## 2025-04-10 DIAGNOSIS — H47.012 ANTERIOR ISCHEMIC OPTIC NEUROPATHY OF LEFT EYE: Primary | ICD-10-CM

## 2025-04-10 PROCEDURE — 92133 CPTRZD OPH DX IMG PST SGM ON: CPT | Performed by: OPHTHALMOLOGY

## 2025-04-10 PROCEDURE — 92083 EXTENDED VISUAL FIELD XM: CPT | Performed by: OPHTHALMOLOGY

## 2025-04-10 PROCEDURE — G0463 HOSPITAL OUTPT CLINIC VISIT: HCPCS | Performed by: OPHTHALMOLOGY

## 2025-04-10 ASSESSMENT — CONF VISUAL FIELD
OS_INFERIOR_NASAL_RESTRICTION: 0
OD_INFERIOR_NASAL_RESTRICTION: 0
OD_NORMAL: 1
OS_INFERIOR_TEMPORAL_RESTRICTION: 0
OS_SUPERIOR_NASAL_RESTRICTION: 0
OS_NORMAL: 1
OD_SUPERIOR_TEMPORAL_RESTRICTION: 0
OD_INFERIOR_TEMPORAL_RESTRICTION: 0
OD_SUPERIOR_NASAL_RESTRICTION: 0
OS_SUPERIOR_TEMPORAL_RESTRICTION: 0
METHOD: COUNTING FINGERS

## 2025-04-10 ASSESSMENT — REFRACTION_WEARINGRX
OS_AXIS: 004
OD_AXIS: 016
OS_ADD: +2.50
OD_CYLINDER: +0.50
OD_ADD: +2.50
OS_CYLINDER: +0.50
OS_SPHERE: ++3.00
OD_SPHERE: +2.50

## 2025-04-10 ASSESSMENT — TONOMETRY
OS_IOP_MMHG: 09
OD_IOP_MMHG: 12
IOP_METHOD: ICARE

## 2025-04-10 ASSESSMENT — VISUAL ACUITY
OD_CC: 20/20
METHOD: SNELLEN - LINEAR
OS_CC: 20/30
OD_CC+: -2
OS_CC+: -1
CORRECTION_TYPE: GLASSES

## 2025-04-10 ASSESSMENT — CUP TO DISC RATIO: OD_RATIO: 0.2

## 2025-04-10 ASSESSMENT — EXTERNAL EXAM - LEFT EYE: OS_EXAM: NORMAL

## 2025-04-10 ASSESSMENT — EXTERNAL EXAM - RIGHT EYE: OD_EXAM: NORMAL

## 2025-04-10 NOTE — PROGRESS NOTES
Kendrick Castellon is a 75 year old male with the following diagnoses:   1. 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 Eliecer Wei the for non-arthritic anterior ischemic optic neuropathy.     HPI:    Kendrick is a 75 year old M with past medical history of hypertension, high cholesterol, and history of Jardiance use for kidney issues. Was noted at 3/31/2025 optometry visit to have progressive, subacute vision loss in left eye and asked to present to emergency department for urgent ophthalmologic evaluation. He was diagnosed with non-arteritic anterior ischemic optic neuropathy by ophthalmology, labs ruled out giant cell arthritis, and he was discharged to follow up with ophthalmology in 4-6 weeks.     He thinks the vision change occurred around first or second week of March.  Since he was seen on 3/31/25, his vision has stayed about the same. He stopped taking the Jardiance.     Independent historians:  Patient    Review of outside testing:  3/31/2025 MRI   IMPRESSION:     HEAD MRI:  1.  Senescent intracranial changes and sequelae of mild chronic microangiopathy without acute intracranial abnormality.     ORBIT MRI:  1.  Unremarkable orbits    My interpretation performed today of outside testing:  I have independently reviewed the MRI.  No optic nerve enhancement or compression.      Review of outside clinical notes:  3/31/2025 Optometry Visit       3/31/2025 ED Visit         Past medical history:  Patient Active Problem List   Diagnosis    Hyperlipidemia LDL goal <100    GERD (gastroesophageal reflux disease)    Microalbuminuria    CKD (chronic kidney disease) stage 2, GFR 60-89 ml/min    Erectile dysfunction, unspecified erectile dysfunction type    Coronary artery disease involving native coronary artery of native heart without angina pectoris    Abnormal cardiovascular stress test    Essential hypertension with goal blood pressure less than 130/80  [FreeTextEntry1] : This note was written by Pranav Mendez on 06/29/2022 acting as scribe for Dr. Christopher Maher M.D.\par \par I, Christopher Maher MD, have read and attest that all the information, medical decision making and discharge instructions within are true and accurate.        Medications:   Current Outpatient Medications   Medication Sig Dispense Refill    aspirin 81 MG tablet Take 1 tablet by mouth daily.  3    ezetimibe (ZETIA) 10 MG tablet Take 1 tablet (10 mg) by mouth daily. 90 tablet 3    LANsoprazole (PREVACID) 15 MG DR capsule Take 15 mg by mouth daily.      lisinopril (ZESTRIL) 40 MG tablet Take 1 tablet (40 mg) by mouth daily. 90 tablet 3    metoprolol succinate ER (TOPROL XL) 25 MG 24 hr tablet Take 1/2 tablet (12.5 mg) by mouth daily 45 tablet 3    rosuvastatin (CRESTOR) 40 MG tablet Take 1 tablet (40 mg) by mouth daily. 90 tablet 3    empagliflozin (JARDIANCE) 10 MG TABS tablet Take 1 tablet (10 mg) by mouth daily. 90 tablet 2    sildenafil (VIAGRA) 100 MG tablet Take 1/2-1 tablets ( mg) by mouth daily as needed (ED) Take 30 min to 4 hours before intercourse.  Never use with nitroglycerin, terazosin or doxazosin. 36 tablet 1         Family history:  Patient's family history includes Cancer in his maternal grandmother; Diabetes in his mother; Heart Disease (age of onset: 67) in his father.     Social history:  Patient  reports that he quit smoking about 38 years ago. His smoking use included cigarettes. He started smoking about 53 years ago. He has a 15 pack-year smoking history. He has been exposed to tobacco smoke. He has never used smokeless tobacco. He reports that he does not currently use alcohol after a past usage of about 2.0 standard drinks of alcohol per week. He reports that he does not use drugs.     Occupation: Retired director of [employer]     Exam:  Visual acuity 20/20 right eye 20/30 left eye.  Color vision 11/11 right eye and 11/11 left eye.  Pupils showed a left afferent pupillary defect. Intraocular pressure 12 right eye and 9 left eye.  Anterior segment exam shows cataracts both eyes.  Fundus exam small cup-to-disc ratio RIGHT eye, optic disc edema LEFT eye with a splinter hb.       Tests ordered and interpreted today:  HVF 24-2 GEO FAST  OU          Performed by: Magdalena   . Patient cooperation: Reliable   .   Good fixation. Retried gaze tracking multiple times. Dilated after vf.     Right Eye  Reliability of the test: Good   . Findings: Visual fields normal   . Interpretation: Normal   . Interval: Initial   .     Left Eye  Reliability of the test: Fair   . Findings: Arcuate scotoma, Altitudinal defect   . Interpretation: Abnormal   . Interval: Initial   .          OCT Optic Nerve RNFL Spectralis OU (both eyes)          Performed by: micaela   .     Right Eye  Reliability of the test: Good   . Test Findings: Normal   . Interpretation: Normal   . Plan: Monitor   . Interval: Initial   .     Left Eye  Reliability of the test: Good   . Test Findings: Abnormal   . Test Findings Free Text: sean HART   . Interval: Initial   .        visual field   Optical coherence tomography       Discussion of management / interpretation with another provider:   None    Assessment/Plan:   It is my impression that patient most likely has nonarteritic anterior ischemic optic neuropathy LEFT eye.  We discussed that there are no clear associations with nonarteritic anterior ischemic optic neuropathy and Jardiance.  We discussed that there are associations that have been reported with the GLP-1 receptor agonists which are a different class of medications than Jardiance.  From my perspective, I do not see a strong reason for him to stay off Jardiance if he needs it for his medical care.  We discussed that there is an approximately 15% risk to the fellow eye.  We discussed that there is no known treatment for ischemic optic neuropathy nor is there any known prevention.  We discussed wearing glasses at all times to protect his good eye.  We also discussed that it is important to make sure the optic disc edema resolves and I would recommend that he follow-up in 6 weeks sooner as needed for worsening symptoms.         Attending Physician Attestation:  Complete documentation of historical and  exam elements from today's encounter can be found in the full encounter summary report (not reduplicated in this progress note).  I personally obtained the chief complaint(s) and history of present illness.  I confirmed and edited as necessary the review of systems, past medical/surgical history, family history, social history, and examination findings as documented by others; and I examined the patient myself.  I personally reviewed the relevant tests, images, and reports as documented above.  I formulated and edited as necessary the assessment and plan and discussed the findings and management plan with the patient and family. - Eliecer Polanco MD     Precharting:  Mari Emery

## 2025-04-10 NOTE — NURSING NOTE
Chief Complaint(s) and History of Present Illness(es)       New Patient    In both eyes.  Associated symptoms include Negative for eye pain and headache.  Pain was noted as 0/10. Additional comments: Kendrick Castellon is a 75 year old male sent for consultation following ED visit for nonarteritic anterior ischemic optic neuropathy.  Patient presented to the ED 03/31/25 for blurring of peripheral vision 2-3 weeks prior. He reports smoky peripheral vision that started in the left eye - feels that this is about the same, no improvement.  He was instructed to stop taking Jardiance.  No eye pain.     POLINA Kim 4/10/2025 7:47 AM

## 2025-04-10 NOTE — LETTER
April 10, 2025     RE:  :  MRN: Kendrick Castellon  1950  [id number]     Dear Dr. Wei:     Thank you for asking me to see your very pleasant patient,Kendrick Castellon, in neuro-ophthalmic consultation.  I would like to thank you for sending your records and I have summarized them in the history of present illness.   My assessment and plan are below.  For further details, please see my attached clinic note.      Kendrick Castellon is a 75 year old male with the following diagnoses:   1. 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 Eliecer Wei the for non-arthritic anterior ischemic optic neuropathy.     HPI: Kendrick is a 75 year old M with past medical history of hypertension, high cholesterol, and history of Jardiance use for kidney issues. Was noted at 3/31/2025 optometry visit to have progressive, subacute vision loss in left eye and asked to present to emergency department for urgent ophthalmologic evaluation. He was diagnosed with non-arteritic anterior ischemic optic neuropathy by ophthalmology, labs ruled out giant cell arthritis, and he was discharged to follow up with ophthalmology in 4-6 weeks.     He thinks the vision change occurred around first or second week of March.  Since he was seen on 3/31/25, his vision has stayed about the same. He stopped taking the Jardiance.     Independent historians:  Patient    Review of outside testing:  3/31/2025 MRI   IMPRESSION:     HEAD MRI:  1.  Senescent intracranial changes and sequelae of mild chronic microangiopathy without acute intracranial abnormality.     ORBIT MRI:  1.  Unremarkable orbits  My interpretation performed today of outside testing:  I have independently reviewed the MRI.  No optic nerve enhancement or compression.      Review of outside clinical notes:  3/31/2025 Optometry Visit       3/31/2025 ED Visit     Past medical history:  Patient Active Problem List   Diagnosis    Hyperlipidemia  LDL goal <100    GERD (gastroesophageal reflux disease)    Microalbuminuria    CKD (chronic kidney disease) stage 2, GFR 60-89 ml/min    Erectile dysfunction, unspecified erectile dysfunction type    Coronary artery disease involving native coronary artery of native heart without angina pectoris    Abnormal cardiovascular stress test    Essential hypertension with goal blood pressure less than 130/80     Medications:   Current Outpatient Medications   Medication Sig Dispense Refill    aspirin 81 MG tablet Take 1 tablet by mouth daily.  3    ezetimibe (ZETIA) 10 MG tablet Take 1 tablet (10 mg) by mouth daily. 90 tablet 3    LANsoprazole (PREVACID) 15 MG DR capsule Take 15 mg by mouth daily.      lisinopril (ZESTRIL) 40 MG tablet Take 1 tablet (40 mg) by mouth daily. 90 tablet 3    metoprolol succinate ER (TOPROL XL) 25 MG 24 hr tablet Take 1/2 tablet (12.5 mg) by mouth daily 45 tablet 3    rosuvastatin (CRESTOR) 40 MG tablet Take 1 tablet (40 mg) by mouth daily. 90 tablet 3    empagliflozin (JARDIANCE) 10 MG TABS tablet Take 1 tablet (10 mg) by mouth daily. 90 tablet 2    sildenafil (VIAGRA) 100 MG tablet Take 1/2-1 tablets ( mg) by mouth daily as needed (ED) Take 30 min to 4 hours before intercourse.  Never use with nitroglycerin, terazosin or doxazosin. 36 tablet 1     Family history: Patient's family history includes Cancer in his maternal grandmother; Diabetes in his mother; Heart Disease (age of onset: 67) in his father.     Social history: Patient  reports that he quit smoking about 38 years ago. His smoking use included cigarettes. He started smoking about 53 years ago. He has a 15 pack-year smoking history. He has been exposed to tobacco smoke. He has never used smokeless tobacco. He reports that he does not currently use alcohol after a past usage of about 2.0 standard drinks of alcohol per week. He reports that he does not use drugs.     Occupation: Retired director of [employer]     Exam: Visual  acuity 20/20 right eye 20/30 left eye.  Color vision 11/11 right eye and 11/11 left eye.  Pupils showed a left afferent pupillary defect. Intraocular pressure 12 right eye and 9 left eye.  Anterior segment exam shows cataracts both eyes.  Fundus exam small cup-to-disc ratio RIGHT eye, optic disc edema LEFT eye with a splinter hb.     Tests ordered and interpreted today:  HVF 24-2 GEO FAST OU    Performed by: Magdalena   . Patient cooperation: Reliable   .   Good fixation. Retried gaze tracking multiple times. Dilated after vf.     Right Eye  Reliability of the test: Good   . Findings: Visual fields normal   . Interpretation: Normal   . Interval: Initial   .   Left Eye  Reliability of the test: Fair   . Findings: Arcuate scotoma, Altitudinal defect   . Interpretation: Abnormal   . Interval: Initial   .   OCT Optic Nerve RNFL Spectralis OU (both eyes)    Performed by: micaela   .   Right Eye  Reliability of the test: Good   . Test Findings: Normal   . Interpretation: Normal   . Plan: Monitor   . Interval: Initial   .     Left Eye  Reliability of the test: Good   . Test Findings: Abnormal   . Test Findings Free Text: sean HART   . Interval: Initial   .   visual field   Optical coherence tomography     Discussion of management / interpretation with another provider:  None    Assessment/Plan:  It is my impression that patient most likely has nonarteritic anterior ischemic optic neuropathy LEFT eye.  We discussed that there are no clear associations with nonarteritic anterior ischemic optic neuropathy and Jardiance.  We discussed that there are associations that have been reported with the GLP-1 receptor agonists which are a different class of medications than Jardiance.  From my perspective, I do not see a strong reason for him to stay off Jardiance if he needs it for his medical care.  We discussed that there is an approximately 15% risk to the fellow eye.  We discussed that there is no known treatment for ischemic optic neuropathy  nor is there any known prevention.  We discussed wearing glasses at all times to protect his good eye.  We also discussed that it is important to make sure the optic disc edema resolves and I would recommend that he follow-up in 6 weeks sooner as needed for worsening symptoms.    Again, thank you for allowing me to participate in the care of your patient.      Sincerely,    Eliecer Polanco MD  Professor  Director of Neuro-Ophthalmology  [facility] Endowed Chair  Departments of Ophthalmology, Neurology, and Neurosurgery  [facility] [address] [place] [address]  [address]  T - [phone]  F - [phone]  WARREN [email or web address]    DX = nonarteritic anterior ischemic optic neuropathy

## 2025-04-10 NOTE — PATIENT INSTRUCTIONS
You can also watch a video on this topic here:     [email or web address]

## 2025-04-14 PROBLEM — H47.019 ISCHEMIC OPTIC NEUROPATHY: Status: ACTIVE | Noted: 2025-04-01

## 2025-04-21 ENCOUNTER — ALLIED HEALTH/NURSE VISIT (OUTPATIENT)
Dept: FAMILY MEDICINE | Facility: CLINIC | Age: 75
End: 2025-04-21
Payer: MEDICARE

## 2025-04-21 ENCOUNTER — MYC MEDICAL ADVICE (OUTPATIENT)
Dept: FAMILY MEDICINE | Facility: CLINIC | Age: 75
End: 2025-04-21

## 2025-04-21 VITALS
TEMPERATURE: 96.9 F | RESPIRATION RATE: 16 BRPM | SYSTOLIC BLOOD PRESSURE: 130 MMHG | WEIGHT: 218 LBS | DIASTOLIC BLOOD PRESSURE: 81 MMHG | HEART RATE: 61 BPM | OXYGEN SATURATION: 96 % | HEIGHT: 69 IN | BODY MASS INDEX: 32.29 KG/M2

## 2025-04-21 DIAGNOSIS — Z01.30 BP CHECK: Primary | ICD-10-CM

## 2025-04-21 DIAGNOSIS — I10 ESSENTIAL HYPERTENSION WITH GOAL BLOOD PRESSURE LESS THAN 130/80: ICD-10-CM

## 2025-04-21 PROCEDURE — 3075F SYST BP GE 130 - 139MM HG: CPT

## 2025-04-21 PROCEDURE — 3078F DIAST BP <80 MM HG: CPT

## 2025-04-21 PROCEDURE — 1126F AMNT PAIN NOTED NONE PRSNT: CPT

## 2025-04-21 PROCEDURE — 99207 PR NO CHARGE NURSE ONLY: CPT

## 2025-04-21 ASSESSMENT — PAIN SCALES - GENERAL: PAINLEVEL_OUTOF10: NO PAIN (0)

## 2025-04-21 NOTE — PROGRESS NOTES
"1. BLOOD PRESSURE: \"What is the blood pressure?\" \"Did you take at least two measurements 5 minutes apart?\"      144/78, 130/81  2. ONSET: \"When did you take your blood pressure?\"      Today 1:41 pm  3. HOW: \"How did you obtain the blood pressure?\" (e.g., visiting nurse, automatic home BP monitor)      Nurse only visit - automatic   4. HISTORY: \"Do you have a history of high blood pressure?\"      Yes   5. MEDICATIONS: \"Are you taking any medications for blood pressure?\" \"Have you missed any doses recently?\"      Metoprolol, Lisinopril   6. OTHER SYMPTOMS: \"Do you have any symptoms?\" (e.g., headache, chest pain, blurred vision, difficulty breathing, weakness)      None  "

## 2025-04-21 NOTE — PROGRESS NOTES
"Kendrick Castellon is a 75 year old patient who comes in today for a Blood Pressure check.  Initial BP:  BP (!) 144/78   Pulse 61   Temp 96.9  F (36.1  C) (Temporal)   Resp 16   Ht 1.753 m (5' 9\")   Wt 98.9 kg (218 lb)   SpO2 96%   BMI 32.19 kg/m       61  Disposition: BP elevated.  Triage RN notified, patient asked to wait   "

## 2025-04-23 RX ORDER — METOPROLOL SUCCINATE 25 MG/1
25 TABLET, EXTENDED RELEASE ORAL DAILY
Qty: 90 TABLET | Refills: 1 | Status: SHIPPED | OUTPATIENT
Start: 2025-04-23

## 2025-05-07 NOTE — PROGRESS NOTES
"PHYSICAL THERAPY EVALUATION  Type of Visit: Evaluation        Fall Risk Screen:       Subjective         Presenting condition or subjective complaint:    Date of onset: 05/02/25 (Referral Date)    Relevant medical history:     Dates & types of surgery:      Prior diagnostic imaging/testing results:       Prior therapy history for the same diagnosis, illness or injury:        Patient presents with low back pain x 9 months. Over the past few years he has been doing a lot of heavy lifting and activities that requires bending over. States that it started as low back pain, but has now started going down his legs. Reports that he walks his dog daily and his back pain will increase his back pain but he will sit for ~5 minutes and back pain will reduce enough to return to walking. Reports pain 0-1/10 in clinic; 5-6/10 at worst.     Aggs: first thing in the morning, sitting (one hour), stiffness upon rising,   Eases: walking, \"fast feet\",     Occupation: Retired - Desk work  Exercise Program: daily walks (40 minutes) with dog    Goals: get rid of pain      Living Environment  Social support:     Type of home:     Stairs to enter the home:         Ramp:     Stairs inside the home:         Help at home:    Equipment owned:       Employment:      Hobbies/Interests:      Patient goals for therapy:         Objective   Posture  Limited lumbar lordosis; sits with posterior pelvic tilt    Functional Tests  Squat: B knee valgus, B heel lift, increased anterior tibial translation    ROM  Lumbar ROM   Left A/PROM Right A/PROM   Flexion Pads of fingertips to floor   Extension Min loss   Side gliding Min loss, pain Min loss     Hip ROM: limited grossly, no reports of pain    Strength  Hip Strength   Left  Right   Flexion 4/5 4/5   Extension 4/5 4/5   Internal Rotation  5/5 5/5   External Rotation 5/5 5/5   Abduction 4-/5 4-/5     Knee Strength    Left  Right   Flexion 5/5 5/5   Extension 5/5 5/5     Ankle Strength    Left Right "   Dorsiflexion 5/5 5/5     Lumbar Special Tests  SLR: stretch, no familiar pain    Repeat Motion  Flexion in sitting: no increase or decrease in pain  Flexion in lying: increase in pain  Extension in lying: Increase in pain  Prone on elbows: pain      Hip Special Tests:   FADER: (-)  FABIR: (-)        Assessment & Plan   CLINICAL IMPRESSIONS  Medical Diagnosis: midline low back pain without sciatica, unspecified chronicity    Treatment Diagnosis: chronic low back pain; lumbar radiculopathy   Impression/Assessment: Patient is a 75 year old male with low back complaints.  The following significant findings have been identified: Pain, Decreased ROM/flexibility, Decreased strength, Impaired gait, Impaired muscle performance, and Decreased activity tolerance. These impairments interfere with their ability to perform self care tasks, recreational activities, household chores, household mobility, and community mobility as compared to previous level of function.     Clinical Decision Making (Complexity):  Clinical Presentation: Stable/Uncomplicated  Clinical Presentation Rationale: based on medical and personal factors listed in PT evaluation  Clinical Decision Making (Complexity): Low complexity    PLAN OF CARE  Treatment Interventions:  Interventions: Manual Therapy, Neuromuscular Re-education, Therapeutic Activity, Therapeutic Exercise    Long Term Goals     PT Goal 1  Goal Identifier: walking program  Goal Description: Patient will report ability to complete entire walk with dog without seated rest breaks due to back pain  Rationale: to maximize safety and independence with performance of ADLs and functional tasks;to maximize safety and independence with self cares;to maximize safety and independence within the community  Goal Progress: Initiated  Target Date: 06/07/25  PT Goal 2  Goal Identifier: Double knee to ches  Goal Description: Patient will demonstrate x10 double knee to chest with no reports of pain to  demonstrate improved lumbar mobility  Rationale: to maximize safety and independence with performance of ADLs and functional tasks;to maximize safety and independence with self cares  Goal Progress: Initiated  Target Date: 06/19/25      Frequency of Treatment: 1x/week for 6 weeks, every other week for additional 6 weeks  Duration of Treatment: 12 weeks    Recommended Referrals to Other Professionals:   Education Assessment:   Learner/Method: Patient    Risks and benefits of evaluation/treatment have been explained.   Patient/Family/caregiver agrees with Plan of Care.     Evaluation Time:     PT Eval, Low Complexity Minutes (47832): 25       Signing Clinician: Katia Chowdary PT        [facility]                                                                                   OUTPATIENT PHYSICAL THERAPY      PLAN OF TREATMENT FOR OUTPATIENT REHABILITATION   Patient's Last Name, First Name, Riley Montesgabriella BREAUX YOB: 1950   Provider's Name   [facility]   Medical Record No.  [id number]     Onset Date: 05/02/25 (Referral Date)  Start of Care Date: 05/08/25     Medical Diagnosis:  midline low back pain without sciatica, unspecified chronicity      PT Treatment Diagnosis:  chronic low back pain; lumbar radiculopathy Plan of Treatment  Frequency/Duration: 1x/week for 6 weeks, every other week for additional 6 weeks/ 12 weeks    Certification date from 05/08/25 to 08/05/25         See note for plan of treatment details and functional goals     Katia Chowdary, PT                         I CERTIFY THE NEED FOR THESE SERVICES FURNISHED UNDER        THIS PLAN OF TREATMENT AND WHILE UNDER MY CARE     (Physician attestation of this document indicates review and certification of the therapy plan).              Referring Provider:  Naima Petty    Initial Assessment  See Epic Evaluation- Start of Care Date: 05/08/25

## 2025-05-08 ENCOUNTER — THERAPY VISIT (OUTPATIENT)
Dept: PHYSICAL THERAPY | Facility: CLINIC | Age: 75
End: 2025-05-08
Attending: NURSE PRACTITIONER
Payer: MEDICARE

## 2025-05-08 DIAGNOSIS — M54.50 MIDLINE LOW BACK PAIN WITHOUT SCIATICA, UNSPECIFIED CHRONICITY: ICD-10-CM

## 2025-05-21 ENCOUNTER — THERAPY VISIT (OUTPATIENT)
Dept: PHYSICAL THERAPY | Facility: CLINIC | Age: 75
End: 2025-05-21
Payer: MEDICARE

## 2025-05-21 DIAGNOSIS — M54.50 MIDLINE LOW BACK PAIN WITHOUT SCIATICA, UNSPECIFIED CHRONICITY: Primary | ICD-10-CM

## 2025-05-21 PROCEDURE — 97110 THERAPEUTIC EXERCISES: CPT | Mod: GP

## 2025-06-04 ENCOUNTER — THERAPY VISIT (OUTPATIENT)
Dept: PHYSICAL THERAPY | Facility: CLINIC | Age: 75
End: 2025-06-04
Payer: MEDICARE

## 2025-06-04 DIAGNOSIS — M54.50 MIDLINE LOW BACK PAIN WITHOUT SCIATICA, UNSPECIFIED CHRONICITY: Primary | ICD-10-CM

## 2025-06-04 PROCEDURE — 97110 THERAPEUTIC EXERCISES: CPT | Mod: GP

## 2025-06-04 PROCEDURE — 97140 MANUAL THERAPY 1/> REGIONS: CPT | Mod: GP

## 2025-06-11 ENCOUNTER — THERAPY VISIT (OUTPATIENT)
Dept: PHYSICAL THERAPY | Facility: CLINIC | Age: 75
End: 2025-06-11
Payer: MEDICARE

## 2025-06-11 DIAGNOSIS — M54.50 MIDLINE LOW BACK PAIN WITHOUT SCIATICA, UNSPECIFIED CHRONICITY: Primary | ICD-10-CM

## 2025-06-11 PROCEDURE — 97110 THERAPEUTIC EXERCISES: CPT | Mod: GP

## 2025-06-12 ENCOUNTER — OFFICE VISIT (OUTPATIENT)
Dept: OPHTHALMOLOGY | Facility: CLINIC | Age: 75
End: 2025-06-12
Attending: OPHTHALMOLOGY
Payer: MEDICARE

## 2025-06-12 DIAGNOSIS — H47.012 ANTERIOR ISCHEMIC OPTIC NEUROPATHY OF LEFT EYE: Primary | ICD-10-CM

## 2025-06-12 PROCEDURE — 92083 EXTENDED VISUAL FIELD XM: CPT | Performed by: OPHTHALMOLOGY

## 2025-06-12 PROCEDURE — 92133 CPTRZD OPH DX IMG PST SGM ON: CPT | Performed by: OPHTHALMOLOGY

## 2025-06-12 PROCEDURE — G0463 HOSPITAL OUTPT CLINIC VISIT: HCPCS | Performed by: OPHTHALMOLOGY

## 2025-06-12 ASSESSMENT — REFRACTION_WEARINGRX
OS_AXIS: 004
OD_AXIS: 016
OD_ADD: +2.50
OS_ADD: +2.50
OD_SPHERE: +2.50
OS_CYLINDER: +0.50
OS_SPHERE: +3.00
OD_CYLINDER: +0.50

## 2025-06-12 ASSESSMENT — CONF VISUAL FIELD
OS_SUPERIOR_TEMPORAL_RESTRICTION: 0
OS_INFERIOR_TEMPORAL_RESTRICTION: 0
OD_SUPERIOR_NASAL_RESTRICTION: 0
OD_NORMAL: 1
OD_INFERIOR_TEMPORAL_RESTRICTION: 0
METHOD: COUNTING FINGERS
OD_INFERIOR_NASAL_RESTRICTION: 0
OD_SUPERIOR_TEMPORAL_RESTRICTION: 0
OS_SUPERIOR_NASAL_RESTRICTION: 0
OS_INFERIOR_NASAL_RESTRICTION: 0
OS_NORMAL: 1

## 2025-06-12 ASSESSMENT — TONOMETRY
OD_IOP_MMHG: 18
IOP_METHOD: ICARE
OS_IOP_MMHG: 12

## 2025-06-12 ASSESSMENT — VISUAL ACUITY
OS_PH_CC+: -2
OS_PH_CC: 20/25
CORRECTION_TYPE: GLASSES
OD_CC+: -2
METHOD: SNELLEN - LINEAR
OS_CC+: +1
OD_CC: 20/20
OS_CC: 20/30

## 2025-06-12 ASSESSMENT — EXTERNAL EXAM - LEFT EYE: OS_EXAM: NORMAL

## 2025-06-12 ASSESSMENT — EXTERNAL EXAM - RIGHT EYE: OD_EXAM: NORMAL

## 2025-06-12 ASSESSMENT — CUP TO DISC RATIO
OS_RATIO: 0.1
OD_RATIO: 0.1

## 2025-06-12 NOTE — PROGRESS NOTES
Kendrick Castellon is a 75 year old male with the following diagnoses:   1. 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 By the time of his visit on April 10, 2025, the vision loss had not changed. He had stopped taking Jardiance, which was initially prescribed for either liver or kidney improvement, and an MRI conducted prior to the visit appeared normal. Since the last visit, his vision has remained the same, with no pain in the eye. He has not resumed Jardiance, despite being advised that there was no relationship between the medication and his non-arteritic ischemic optic neuropathy. He plans to discuss further management with his doctor after being off the medication for a while and is considering a blood workup to assess any changes related to his kidneys before potentially resuming Jardiance.      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 One plus nuclear sclerosis and cortical spokes approaching the central visual axis in the right eye. No cell or flare in the right eye. Fundus exam of the right eye shows a 0.1 cup-to-disc ratio, posterior vitreous detachment, normal optic nerve, normal macula, and vessels. Left eye similar to the right eye with moderate pallor and no evidence of optic disc edema.     Tests ordered and interpreted today:    HVF 24-2 GEO FAST OU          Performed by: lily   . Patient cooperation: Reliable   .   Great fix. Dilated after vf. Good fixation. Retried gaze tracking multiple times. Dilated after vf.     Right Eye  Reliability of the test: Good   . Findings: Visual fields normal   . Interpretation: Normal   . Interval: Same   .     Left Eye  Reliability of the test: Fair   . Findings: Arcuate scotoma, Altitudinal defect   . Interpretation: Abnormal   . Interval: Same   .           OCT Optic Nerve RNFL Spectralis OU (both eyes)          Performed by: lupe   .     Right Eye  Reliability of the test: Good   . Test Findings: Normal   . Interpretation: Normal   . Plan: Monitor   . Interval: Same   .     Left Eye  Reliability of the test: Good   . Test Findings: Abnormal   . Interpretation: Diffuse loss   . Interval: Same   .            Assessment and Plan:     Assessment & Plan  Non-arteritic anterior ischemic optic neuropathy:  Condition is consistent with non-arteritic anterior ischemic optic neuropathy. No pain in the eye reported. No evidence of optic disc edema. Normal optic nerve and vessels observed.  Consideration of blood workup to assess kidney function before resuming Jardiance. Discussion with primary doctor recommended before restarting Jardiance due to cost concerns.  Risks and side effects: Potential risk of recurrence in the other eye if Jardiance is resumed, though not known to be caused by Jardiance.     Overall, his optic disc edema has resolved as we would expect with ischemic optic neuropathy         Attending Physician Attestation:  Complete documentation of historical and exam elements from today's encounter can be found in the full encounter summary report (not reduplicated in this progress note).  I personally obtained the chief complaint(s) and history of present illness.  I confirmed and edited as necessary the review of systems, past medical/surgical history, family history, social history, and examination findings as documented by others; and I examined the patient myself.  I personally reviewed the relevant tests, images, and reports as documented above.  I formulated and edited as necessary the assessment and plan and discussed the findings and management plan with the patient and family. - Eliecer Polanco MD

## 2025-06-14 ENCOUNTER — MYC REFILL (OUTPATIENT)
Dept: FAMILY MEDICINE | Facility: CLINIC | Age: 75
End: 2025-06-14

## 2025-06-14 DIAGNOSIS — K21.9 GASTROESOPHAGEAL REFLUX DISEASE WITHOUT ESOPHAGITIS: Primary | ICD-10-CM

## 2025-06-14 DIAGNOSIS — I25.10 CORONARY ARTERY DISEASE INVOLVING NATIVE CORONARY ARTERY OF NATIVE HEART WITHOUT ANGINA PECTORIS: ICD-10-CM

## 2025-06-14 DIAGNOSIS — I10 ESSENTIAL HYPERTENSION WITH GOAL BLOOD PRESSURE LESS THAN 130/80: ICD-10-CM

## 2025-06-14 DIAGNOSIS — N18.2 CKD (CHRONIC KIDNEY DISEASE) STAGE 2, GFR 60-89 ML/MIN: ICD-10-CM

## 2025-06-16 RX ORDER — METOPROLOL SUCCINATE 25 MG/1
25 TABLET, EXTENDED RELEASE ORAL DAILY
Qty: 90 TABLET | Refills: 1 | OUTPATIENT
Start: 2025-06-16

## 2025-06-16 RX ORDER — LISINOPRIL 40 MG/1
40 TABLET ORAL DAILY
Qty: 90 TABLET | Refills: 3 | OUTPATIENT
Start: 2025-06-16

## 2025-06-16 RX ORDER — MECOBALAMIN 5000 MCG
15 TABLET,DISINTEGRATING ORAL DAILY
Qty: 90 CAPSULE | Refills: 1 | Status: SHIPPED | OUTPATIENT
Start: 2025-06-16

## 2025-06-16 RX ORDER — EZETIMIBE 10 MG/1
10 TABLET ORAL DAILY
Qty: 90 TABLET | Refills: 3 | OUTPATIENT
Start: 2025-06-16

## 2025-06-17 ENCOUNTER — TELEPHONE (OUTPATIENT)
Dept: FAMILY MEDICINE | Facility: CLINIC | Age: 75
End: 2025-06-17
Payer: MEDICARE

## 2025-06-17 DIAGNOSIS — K21.9 GASTROESOPHAGEAL REFLUX DISEASE WITHOUT ESOPHAGITIS: Primary | ICD-10-CM

## 2025-06-17 NOTE — TELEPHONE ENCOUNTER
Prior Authorization Retail Medication Request    Medication/Dose: LANsoprazole (PREVACID) 15 MG DR capsule   Diagnosis and ICD code (if different than what is on RX):    New/renewal/insurance change PA/secondary ins. PA:  Previously Tried and Failed:    Rationale:      Insurance   Primary:   Insurance ID:      Secondary (if applicable):  Insurance ID:      Pharmacy Information (if different than what is on RX)  Name:    Phone:    Fax:    Clinic Information  Preferred routing pool for dept communication: [facility]

## 2025-06-18 ENCOUNTER — THERAPY VISIT (OUTPATIENT)
Dept: PHYSICAL THERAPY | Facility: CLINIC | Age: 75
End: 2025-06-18
Payer: MEDICARE

## 2025-06-18 DIAGNOSIS — M54.50 MIDLINE LOW BACK PAIN WITHOUT SCIATICA, UNSPECIFIED CHRONICITY: Primary | ICD-10-CM

## 2025-06-18 PROCEDURE — 97110 THERAPEUTIC EXERCISES: CPT | Mod: GP

## 2025-06-18 RX ORDER — OMEPRAZOLE 20 MG/1
20 CAPSULE, DELAYED RELEASE ORAL DAILY
Qty: 90 CAPSULE | Refills: 1 | Status: SHIPPED | OUTPATIENT
Start: 2025-06-18

## 2025-06-18 NOTE — TELEPHONE ENCOUNTER
PA Initiation    Medication: LANSOPRAZOLE 15 MG PO CPDR  Insurance Company: [employer] - Phone [phone] Fax [phone]  Pharmacy Filling the Rx: [id number] - [place], [place] - [address]  Filling Pharmacy Phone: [phone]  Filling Pharmacy Fax: [phone]  Start Date: 6/18/2025

## 2025-06-18 NOTE — TELEPHONE ENCOUNTER
PRIOR AUTHORIZATION DENIED    Medication: LANSOPRAZOLE 15 MG PO CPDR    Insurance Company: [employer] - Phone [phone] Fax [phone]    Denial Date: 6/18/2025    Denial Reason(s): Patient needs to try and fail Omeprazole capsule AND pantoprazole tablet.     Appeal Information:

## 2025-06-25 ENCOUNTER — THERAPY VISIT (OUTPATIENT)
Dept: PHYSICAL THERAPY | Facility: CLINIC | Age: 75
End: 2025-06-25
Payer: MEDICARE

## 2025-06-25 DIAGNOSIS — M54.50 MIDLINE LOW BACK PAIN WITHOUT SCIATICA, UNSPECIFIED CHRONICITY: Primary | ICD-10-CM

## 2025-06-25 PROCEDURE — 97110 THERAPEUTIC EXERCISES: CPT | Mod: GP

## 2025-07-02 ENCOUNTER — THERAPY VISIT (OUTPATIENT)
Dept: PHYSICAL THERAPY | Facility: CLINIC | Age: 75
End: 2025-07-02
Payer: MEDICARE

## 2025-07-02 DIAGNOSIS — M54.50 MIDLINE LOW BACK PAIN WITHOUT SCIATICA, UNSPECIFIED CHRONICITY: Primary | ICD-10-CM

## 2025-07-02 PROCEDURE — 97110 THERAPEUTIC EXERCISES: CPT | Mod: GP

## 2025-07-29 ENCOUNTER — THERAPY VISIT (OUTPATIENT)
Dept: PHYSICAL THERAPY | Facility: CLINIC | Age: 75
End: 2025-07-29
Payer: MEDICARE

## 2025-07-29 DIAGNOSIS — M54.50 MIDLINE LOW BACK PAIN WITHOUT SCIATICA, UNSPECIFIED CHRONICITY: Primary | ICD-10-CM

## 2025-07-29 PROCEDURE — 97530 THERAPEUTIC ACTIVITIES: CPT | Mod: GP

## 2025-07-29 NOTE — PROGRESS NOTES
07/29/25 0500   Appointment Info   Signing clinician's name / credentials Katia Chowdary, PT, DPT   Total/Authorized Visits E&T   Visits Used 9   Medical Diagnosis midline low back pain without sciatica, unspecified chronicity   PT Tx Diagnosis chronic low back pain; lumbar radiculopathy   Progress Note/Certification   Start of Care Date 05/08/25   Onset of illness/injury or Date of Surgery 05/02/25  (Referral Date)   Therapy Frequency 1x/week for 6 weeks, every other week for additional 6 weeks   Predicted Duration 12 weeks   Certification date from 05/08/25   Certification date to 08/05/25   Progress Note Due Date 08/05/25   Progress Note Completed Date 05/08/25   GOALS   PT Goals 2   PT Goal 1   Goal Identifier walking program   Goal Description Patient will report ability to complete entire walk with dog without seated rest breaks due to back pain   Rationale to maximize safety and independence with performance of ADLs and functional tasks;to maximize safety and independence with self cares;to maximize safety and independence within the community   Goal Progress Pt has continued to require seated rest breaks while walking   Target Date 06/07/25   PT Goal 2   Goal Identifier Double knee to ches   Goal Description Patient will demonstrate x10 double knee to chest with no reports of pain to demonstrate improved lumbar mobility   Rationale to maximize safety and independence with performance of ADLs and functional tasks;to maximize safety and independence with self cares   Goal Progress Met   Target Date 06/19/25   Date Met 07/29/25   Subjective Report   Subjective Report Reports he has done stretching program prior to walking which seemed to help and he was able to go longer on his walk. Otherwise pain remains.   Objective Measures   Objective Measures Objective Measure 1;Objective Measure 2;Objective Measure 3   Objective Measure 1   Objective Measure Strength   Details 5/5 hip flexion, IR/ER, knee extension,  knee flex; 4+/5 hip extension and abduction   Objective Measure 2   Objective Measure ROM   Details Flexion: hands to toes, Ext: mod limitation, R/L SG: min limitations   Objective Measure 3   Objective Measure hip ROM   Details WFL in all ranges   Treatment Interventions (PT)   Interventions Therapeutic Procedure/Exercise;Neuromuscular Re-education;Manual Therapy;Therapeutic Activity   Therapeutic Procedure/Exercise   Therapeutic Procedures Ther Proc 2   Ther Proc 1 Education   Ther Proc 1 - Details Provided education on anatomy/physiology of affected region, importance of HEP compliance, and POC   PTRx Ther Proc 1 Standing Hip Flexor Stretch   PTRx Ther Proc 1 - Details rev HEP   PTRx Ther Proc 2 Supine Abdominal Exercise #3B (Two Leg Marching)   PTRx Ther Proc 2 - Details HEP   PTRx Ther Proc 3 Bridging #1   PTRx Ther Proc 3 - Details HEP   PTRx Ther Proc 4 Standing Extension at Counter Supported   PTRx Ther Proc 4 - Details HEP   PTRx Ther Proc 5 Prone Press Ups   PTRx Ther Proc 5 - Details HEP   PTRx Ther Proc 6 Pretzel Stretch   PTRx Ther Proc 6 - Details HEP   PTRx Ther Proc 7 Prone Quadriceps Stretch   PTRx Ther Proc 7 - Details HEP   PTRx Ther Proc 8 Standing IT Band Stretch Using Wall   PTRx Ther Proc 8 - Details HEP   PTRx Ther Proc 9 Seated Hamstring Stretch   PTRx Ther Proc 9 - Details HEP   PTRx Ther Proc 10  Hip Flexor Stretch Bossman Test Position   PTRx Ther Proc 10 - Details HEP   Skilled Intervention Reviewed HEP and educated on importance of compliance.   Patient Response/Progress Understood, tolerated well.   Therapeutic Activity   Therapeutic Activities: dynamic activities to improve functional performance minutes (88787) 25   Therapeutic Activities Ther Act 2;Ther Act 3   Ther Act 1 Objective measurements   Ther Act 1 - Details see obj measures   Ther Act 2 Discharge planning   Ther Act 2 - Details education and review of HEP   PTRx Ther Act 1 Posture Correction with Lumbar Roll   PTRx Ther Act  1 - Details edu    Skilled Intervention Assessment and education for discharge   Patient Response/Progress understanding and agreeable   Education   Learner/Method Patient   Plan   Home program PTRx printed   Updates to plan of care discharge   Plan for next session n/a   Comments   Comments Patient reports minimal improvements during month off of PT. States that he feels better when he is compliant with HEP, but he is not consistent. Educated on utilizing tools provided by PT and escalating to a spine provider as needed. Patient is appropriate for d/c to HEP today.   Total Session Time   Timed Code Treatment Minutes 25   Total Treatment Time (sum of timed and untimed services) 25           DISCHARGE  Reason for Discharge: No further expectation of progress.    Equipment Issued: n/a    Discharge Plan: Patient to continue home program.    Referring Provider:  Naima Petty

## (undated) RX ORDER — FENTANYL CITRATE 50 UG/ML
INJECTION, SOLUTION INTRAMUSCULAR; INTRAVENOUS
Status: DISPENSED
Start: 2022-05-04